# Patient Record
Sex: FEMALE | Race: OTHER | HISPANIC OR LATINO | URBAN - METROPOLITAN AREA
[De-identification: names, ages, dates, MRNs, and addresses within clinical notes are randomized per-mention and may not be internally consistent; named-entity substitution may affect disease eponyms.]

---

## 2021-01-01 ENCOUNTER — INPATIENT (INPATIENT)
Facility: HOSPITAL | Age: 86
LOS: 3 days | Discharge: HOME | DRG: 94 | End: 2021-02-26
Attending: INTERNAL MEDICINE | Admitting: HOSPITALIST
Payer: MEDICARE

## 2021-01-01 ENCOUNTER — INPATIENT (INPATIENT)
Facility: HOSPITAL | Age: 86
LOS: 11 days | DRG: 640 | End: 2021-05-23
Attending: FAMILY MEDICINE | Admitting: INTERNAL MEDICINE
Payer: MEDICARE

## 2021-01-01 VITALS
WEIGHT: 104.06 LBS | HEIGHT: 63 IN | OXYGEN SATURATION: 95 % | DIASTOLIC BLOOD PRESSURE: 87 MMHG | HEART RATE: 87 BPM | RESPIRATION RATE: 17 BRPM | TEMPERATURE: 97 F | SYSTOLIC BLOOD PRESSURE: 138 MMHG

## 2021-01-01 VITALS
DIASTOLIC BLOOD PRESSURE: 76 MMHG | OXYGEN SATURATION: 97 % | TEMPERATURE: 98 F | RESPIRATION RATE: 22 BRPM | SYSTOLIC BLOOD PRESSURE: 108 MMHG | HEART RATE: 120 BPM | HEIGHT: 63 IN

## 2021-01-01 VITALS
SYSTOLIC BLOOD PRESSURE: 132 MMHG | TEMPERATURE: 98 F | RESPIRATION RATE: 18 BRPM | HEART RATE: 77 BPM | DIASTOLIC BLOOD PRESSURE: 68 MMHG | OXYGEN SATURATION: 96 %

## 2021-01-01 VITALS
SYSTOLIC BLOOD PRESSURE: 89 MMHG | RESPIRATION RATE: 24 BRPM | OXYGEN SATURATION: 85 % | TEMPERATURE: 99 F | HEART RATE: 120 BPM | DIASTOLIC BLOOD PRESSURE: 58 MMHG

## 2021-01-01 DIAGNOSIS — J98.09 OTHER DISEASES OF BRONCHUS, NOT ELSEWHERE CLASSIFIED: ICD-10-CM

## 2021-01-01 DIAGNOSIS — Z29.9 ENCOUNTER FOR PROPHYLACTIC MEASURES, UNSPECIFIED: ICD-10-CM

## 2021-01-01 DIAGNOSIS — R52 PAIN, UNSPECIFIED: ICD-10-CM

## 2021-01-01 DIAGNOSIS — L89.90 PRESSURE ULCER OF UNSPECIFIED SITE, UNSPECIFIED STAGE: ICD-10-CM

## 2021-01-01 DIAGNOSIS — I48.20 CHRONIC ATRIAL FIBRILLATION, UNSPECIFIED: ICD-10-CM

## 2021-01-01 DIAGNOSIS — R06.00 DYSPNEA, UNSPECIFIED: ICD-10-CM

## 2021-01-01 DIAGNOSIS — R53.2 FUNCTIONAL QUADRIPLEGIA: ICD-10-CM

## 2021-01-01 DIAGNOSIS — G93.40 ENCEPHALOPATHY, UNSPECIFIED: ICD-10-CM

## 2021-01-01 DIAGNOSIS — K59.00 CONSTIPATION, UNSPECIFIED: ICD-10-CM

## 2021-01-01 DIAGNOSIS — R56.9 UNSPECIFIED CONVULSIONS: ICD-10-CM

## 2021-01-01 DIAGNOSIS — Z51.5 ENCOUNTER FOR PALLIATIVE CARE: ICD-10-CM

## 2021-01-01 DIAGNOSIS — R45.1 RESTLESSNESS AND AGITATION: ICD-10-CM

## 2021-01-01 DIAGNOSIS — F03.90 UNSPECIFIED DEMENTIA, UNSPECIFIED SEVERITY, WITHOUT BEHAVIORAL DISTURBANCE, PSYCHOTIC DISTURBANCE, MOOD DISTURBANCE, AND ANXIETY: ICD-10-CM

## 2021-01-01 DIAGNOSIS — G40.909 EPILEPSY, UNSPECIFIED, NOT INTRACTABLE, WITHOUT STATUS EPILEPTICUS: ICD-10-CM

## 2021-01-01 DIAGNOSIS — Z02.9 ENCOUNTER FOR ADMINISTRATIVE EXAMINATIONS, UNSPECIFIED: ICD-10-CM

## 2021-01-01 DIAGNOSIS — J98.11 ATELECTASIS: ICD-10-CM

## 2021-01-01 DIAGNOSIS — I48.91 UNSPECIFIED ATRIAL FIBRILLATION: ICD-10-CM

## 2021-01-01 DIAGNOSIS — R41.0 DISORIENTATION, UNSPECIFIED: ICD-10-CM

## 2021-01-01 DIAGNOSIS — S06.5X9A TRAUMATIC SUBDURAL HEMORRHAGE WITH LOSS OF CONSCIOUSNESS OF UNSPECIFIED DURATION, INITIAL ENCOUNTER: ICD-10-CM

## 2021-01-01 DIAGNOSIS — E11.65 TYPE 2 DIABETES MELLITUS WITH HYPERGLYCEMIA: ICD-10-CM

## 2021-01-01 DIAGNOSIS — E87.0 HYPEROSMOLALITY AND HYPERNATREMIA: ICD-10-CM

## 2021-01-01 DIAGNOSIS — Z71.89 OTHER SPECIFIED COUNSELING: ICD-10-CM

## 2021-01-01 DIAGNOSIS — R62.51 FAILURE TO THRIVE (CHILD): ICD-10-CM

## 2021-01-01 DIAGNOSIS — L89.159 PRESSURE ULCER OF SACRAL REGION, UNSPECIFIED STAGE: ICD-10-CM

## 2021-01-01 LAB
A1C WITH ESTIMATED AVERAGE GLUCOSE RESULT: 7 % — HIGH (ref 4–5.6)
ALBUMIN SERPL ELPH-MCNC: 2 G/DL — LOW (ref 3.3–5)
ALBUMIN SERPL ELPH-MCNC: 2.8 G/DL — LOW (ref 3.3–5)
ALBUMIN SERPL ELPH-MCNC: 2.9 G/DL — LOW (ref 3.3–5)
ALBUMIN SERPL ELPH-MCNC: 3 G/DL — LOW (ref 3.3–5)
ALBUMIN SERPL ELPH-MCNC: 3.5 G/DL — SIGNIFICANT CHANGE UP (ref 3.3–5)
ALP SERPL-CCNC: 113 U/L — SIGNIFICANT CHANGE UP (ref 40–120)
ALP SERPL-CCNC: 171 U/L — HIGH (ref 40–120)
ALP SERPL-CCNC: 176 U/L — HIGH (ref 40–120)
ALP SERPL-CCNC: 184 U/L — HIGH (ref 40–120)
ALP SERPL-CCNC: 189 U/L — HIGH (ref 40–120)
ALT FLD-CCNC: 10 U/L — SIGNIFICANT CHANGE UP (ref 10–45)
ALT FLD-CCNC: 18 U/L — SIGNIFICANT CHANGE UP (ref 10–45)
ALT FLD-CCNC: 21 U/L — SIGNIFICANT CHANGE UP (ref 10–45)
ALT FLD-CCNC: 21 U/L — SIGNIFICANT CHANGE UP (ref 10–45)
ALT FLD-CCNC: 24 U/L — SIGNIFICANT CHANGE UP (ref 10–45)
ANION GAP SERPL CALC-SCNC: 10 MMOL/L — SIGNIFICANT CHANGE UP (ref 5–17)
ANION GAP SERPL CALC-SCNC: 12 MMOL/L — SIGNIFICANT CHANGE UP (ref 5–17)
ANION GAP SERPL CALC-SCNC: 13 MMOL/L — SIGNIFICANT CHANGE UP (ref 5–17)
ANION GAP SERPL CALC-SCNC: 13 MMOL/L — SIGNIFICANT CHANGE UP (ref 5–17)
ANION GAP SERPL CALC-SCNC: 7 MMOL/L — SIGNIFICANT CHANGE UP (ref 5–17)
ANION GAP SERPL CALC-SCNC: 7 MMOL/L — SIGNIFICANT CHANGE UP (ref 5–17)
ANION GAP SERPL CALC-SCNC: 8 MMOL/L — SIGNIFICANT CHANGE UP (ref 5–17)
ANION GAP SERPL CALC-SCNC: 9 MMOL/L — SIGNIFICANT CHANGE UP (ref 5–17)
ANISOCYTOSIS BLD QL: SLIGHT — SIGNIFICANT CHANGE UP
APPEARANCE UR: ABNORMAL
APPEARANCE UR: ABNORMAL
APTT BLD: 27.4 SEC — LOW (ref 27.5–35.5)
APTT BLD: 31.3 SEC — SIGNIFICANT CHANGE UP (ref 27.5–35.5)
AST SERPL-CCNC: 17 U/L — SIGNIFICANT CHANGE UP (ref 10–40)
AST SERPL-CCNC: 18 U/L — SIGNIFICANT CHANGE UP (ref 10–40)
AST SERPL-CCNC: 23 U/L — SIGNIFICANT CHANGE UP (ref 10–40)
AST SERPL-CCNC: 24 U/L — SIGNIFICANT CHANGE UP (ref 10–40)
AST SERPL-CCNC: 25 U/L — SIGNIFICANT CHANGE UP (ref 10–40)
BACTERIA # UR AUTO: NEGATIVE — SIGNIFICANT CHANGE UP
BASE EXCESS BLDV CALC-SCNC: 3.9 MMOL/L — HIGH (ref -2–2)
BASOPHILS # BLD AUTO: 0.03 K/UL — SIGNIFICANT CHANGE UP (ref 0–0.2)
BASOPHILS # BLD AUTO: 0.05 K/UL — SIGNIFICANT CHANGE UP (ref 0–0.2)
BASOPHILS # BLD AUTO: 0.06 K/UL — SIGNIFICANT CHANGE UP (ref 0–0.2)
BASOPHILS # BLD AUTO: 0.06 K/UL — SIGNIFICANT CHANGE UP (ref 0–0.2)
BASOPHILS # BLD AUTO: 0.09 K/UL — SIGNIFICANT CHANGE UP (ref 0–0.2)
BASOPHILS NFR BLD AUTO: 0.6 % — SIGNIFICANT CHANGE UP (ref 0–2)
BASOPHILS NFR BLD AUTO: 0.9 % — SIGNIFICANT CHANGE UP (ref 0–2)
BASOPHILS NFR BLD AUTO: 0.9 % — SIGNIFICANT CHANGE UP (ref 0–2)
BASOPHILS NFR BLD AUTO: 1.1 % — SIGNIFICANT CHANGE UP (ref 0–2)
BASOPHILS NFR BLD AUTO: 1.3 % — SIGNIFICANT CHANGE UP (ref 0–2)
BILIRUB DIRECT SERPL-MCNC: <0.1 MG/DL — SIGNIFICANT CHANGE UP (ref 0–0.2)
BILIRUB INDIRECT FLD-MCNC: >0.1 MG/DL — LOW (ref 0.2–1)
BILIRUB SERPL-MCNC: 0.2 MG/DL — SIGNIFICANT CHANGE UP (ref 0.2–1.2)
BILIRUB SERPL-MCNC: 0.3 MG/DL — SIGNIFICANT CHANGE UP (ref 0.2–1.2)
BILIRUB UR-MCNC: NEGATIVE — SIGNIFICANT CHANGE UP
BILIRUB UR-MCNC: NEGATIVE — SIGNIFICANT CHANGE UP
BUN SERPL-MCNC: 10 MG/DL — SIGNIFICANT CHANGE UP (ref 7–23)
BUN SERPL-MCNC: 10 MG/DL — SIGNIFICANT CHANGE UP (ref 7–23)
BUN SERPL-MCNC: 12 MG/DL — SIGNIFICANT CHANGE UP (ref 7–23)
BUN SERPL-MCNC: 12 MG/DL — SIGNIFICANT CHANGE UP (ref 7–23)
BUN SERPL-MCNC: 13 MG/DL — SIGNIFICANT CHANGE UP (ref 7–23)
BUN SERPL-MCNC: 16 MG/DL — SIGNIFICANT CHANGE UP (ref 7–23)
BUN SERPL-MCNC: 17 MG/DL — SIGNIFICANT CHANGE UP (ref 7–23)
BUN SERPL-MCNC: 20 MG/DL — SIGNIFICANT CHANGE UP (ref 7–23)
BUN SERPL-MCNC: 22 MG/DL — SIGNIFICANT CHANGE UP (ref 7–23)
BUN SERPL-MCNC: 25 MG/DL — HIGH (ref 7–23)
BUN SERPL-MCNC: 29 MG/DL — HIGH (ref 7–23)
BUN SERPL-MCNC: 32 MG/DL — HIGH (ref 7–23)
BUN SERPL-MCNC: 36 MG/DL — HIGH (ref 7–23)
BUN SERPL-MCNC: 39 MG/DL — HIGH (ref 7–23)
BUN SERPL-MCNC: 39 MG/DL — HIGH (ref 7–23)
BUN SERPL-MCNC: 7 MG/DL — SIGNIFICANT CHANGE UP (ref 7–23)
BUN SERPL-MCNC: 8 MG/DL — SIGNIFICANT CHANGE UP (ref 7–23)
CA-I SERPL-SCNC: 1.22 MMOL/L — SIGNIFICANT CHANGE UP (ref 1.12–1.3)
CALCIUM SERPL-MCNC: 6.3 MG/DL — CRITICAL LOW (ref 8.4–10.5)
CALCIUM SERPL-MCNC: 7.7 MG/DL — LOW (ref 8.4–10.5)
CALCIUM SERPL-MCNC: 7.8 MG/DL — LOW (ref 8.4–10.5)
CALCIUM SERPL-MCNC: 7.9 MG/DL — LOW (ref 8.4–10.5)
CALCIUM SERPL-MCNC: 7.9 MG/DL — LOW (ref 8.4–10.5)
CALCIUM SERPL-MCNC: 8.3 MG/DL — LOW (ref 8.4–10.5)
CALCIUM SERPL-MCNC: 8.4 MG/DL — SIGNIFICANT CHANGE UP (ref 8.4–10.5)
CALCIUM SERPL-MCNC: 8.5 MG/DL — SIGNIFICANT CHANGE UP (ref 8.4–10.5)
CALCIUM SERPL-MCNC: 8.7 MG/DL — SIGNIFICANT CHANGE UP (ref 8.4–10.5)
CALCIUM SERPL-MCNC: 8.8 MG/DL — SIGNIFICANT CHANGE UP (ref 8.4–10.5)
CALCIUM SERPL-MCNC: 8.9 MG/DL — SIGNIFICANT CHANGE UP (ref 8.4–10.5)
CALCIUM SERPL-MCNC: 9 MG/DL — SIGNIFICANT CHANGE UP (ref 8.4–10.5)
CALCIUM SERPL-MCNC: 9.4 MG/DL — SIGNIFICANT CHANGE UP (ref 8.4–10.5)
CHLORIDE BLDV-SCNC: >120 MMOL/L — HIGH (ref 96–108)
CHLORIDE SERPL-SCNC: 106 MMOL/L — SIGNIFICANT CHANGE UP (ref 96–108)
CHLORIDE SERPL-SCNC: 106 MMOL/L — SIGNIFICANT CHANGE UP (ref 96–108)
CHLORIDE SERPL-SCNC: 108 MMOL/L — SIGNIFICANT CHANGE UP (ref 96–108)
CHLORIDE SERPL-SCNC: 114 MMOL/L — HIGH (ref 96–108)
CHLORIDE SERPL-SCNC: 116 MMOL/L — HIGH (ref 96–108)
CHLORIDE SERPL-SCNC: 116 MMOL/L — HIGH (ref 96–108)
CHLORIDE SERPL-SCNC: 117 MMOL/L — HIGH (ref 96–108)
CHLORIDE SERPL-SCNC: 120 MMOL/L — HIGH (ref 96–108)
CHLORIDE SERPL-SCNC: 121 MMOL/L — HIGH (ref 96–108)
CHLORIDE SERPL-SCNC: 122 MMOL/L — HIGH (ref 96–108)
CHLORIDE SERPL-SCNC: 126 MMOL/L — HIGH (ref 96–108)
CHLORIDE SERPL-SCNC: 128 MMOL/L — HIGH (ref 96–108)
CHLORIDE SERPL-SCNC: 131 MMOL/L — HIGH (ref 96–108)
CHLORIDE SERPL-SCNC: 131 MMOL/L — HIGH (ref 96–108)
CHLORIDE SERPL-SCNC: 132 MMOL/L — HIGH (ref 96–108)
CHLORIDE SERPL-SCNC: 133 MMOL/L — HIGH (ref 96–108)
CHLORIDE SERPL-SCNC: >135 MMOL/L — HIGH (ref 96–108)
CO2 BLDV-SCNC: 33 MMOL/L — HIGH (ref 22–30)
CO2 SERPL-SCNC: 14 MMOL/L — LOW (ref 22–31)
CO2 SERPL-SCNC: 17 MMOL/L — LOW (ref 22–31)
CO2 SERPL-SCNC: 17 MMOL/L — LOW (ref 22–31)
CO2 SERPL-SCNC: 19 MMOL/L — LOW (ref 22–31)
CO2 SERPL-SCNC: 19 MMOL/L — LOW (ref 22–31)
CO2 SERPL-SCNC: 20 MMOL/L — LOW (ref 22–31)
CO2 SERPL-SCNC: 21 MMOL/L — LOW (ref 22–31)
CO2 SERPL-SCNC: 22 MMOL/L — SIGNIFICANT CHANGE UP (ref 22–31)
CO2 SERPL-SCNC: 22 MMOL/L — SIGNIFICANT CHANGE UP (ref 22–31)
CO2 SERPL-SCNC: 23 MMOL/L — SIGNIFICANT CHANGE UP (ref 22–31)
CO2 SERPL-SCNC: 23 MMOL/L — SIGNIFICANT CHANGE UP (ref 22–31)
CO2 SERPL-SCNC: 24 MMOL/L — SIGNIFICANT CHANGE UP (ref 22–31)
CO2 SERPL-SCNC: 27 MMOL/L — SIGNIFICANT CHANGE UP (ref 22–31)
CO2 SERPL-SCNC: 30 MMOL/L — SIGNIFICANT CHANGE UP (ref 22–31)
COLOR SPEC: SIGNIFICANT CHANGE UP
COLOR SPEC: YELLOW — SIGNIFICANT CHANGE UP
COVID-19 SPIKE DOMAIN AB INTERP: POSITIVE
COVID-19 SPIKE DOMAIN ANTIBODY RESULT: 182 U/ML — HIGH
CREAT SERPL-MCNC: 0.42 MG/DL — LOW (ref 0.5–1.3)
CREAT SERPL-MCNC: 0.44 MG/DL — LOW (ref 0.5–1.3)
CREAT SERPL-MCNC: 0.44 MG/DL — LOW (ref 0.5–1.3)
CREAT SERPL-MCNC: 0.45 MG/DL — LOW (ref 0.5–1.3)
CREAT SERPL-MCNC: 0.46 MG/DL — LOW (ref 0.5–1.3)
CREAT SERPL-MCNC: 0.46 MG/DL — LOW (ref 0.5–1.3)
CREAT SERPL-MCNC: 0.48 MG/DL — LOW (ref 0.5–1.3)
CREAT SERPL-MCNC: 0.48 MG/DL — LOW (ref 0.5–1.3)
CREAT SERPL-MCNC: 0.49 MG/DL — LOW (ref 0.5–1.3)
CREAT SERPL-MCNC: 0.59 MG/DL — SIGNIFICANT CHANGE UP (ref 0.5–1.3)
CREAT SERPL-MCNC: 0.59 MG/DL — SIGNIFICANT CHANGE UP (ref 0.5–1.3)
CREAT SERPL-MCNC: 0.61 MG/DL — SIGNIFICANT CHANGE UP (ref 0.5–1.3)
CREAT SERPL-MCNC: 0.63 MG/DL — SIGNIFICANT CHANGE UP (ref 0.5–1.3)
CREAT SERPL-MCNC: 0.63 MG/DL — SIGNIFICANT CHANGE UP (ref 0.5–1.3)
CREAT SERPL-MCNC: 0.64 MG/DL — SIGNIFICANT CHANGE UP (ref 0.5–1.3)
CREAT SERPL-MCNC: 0.65 MG/DL — SIGNIFICANT CHANGE UP (ref 0.5–1.3)
CREAT SERPL-MCNC: 0.67 MG/DL — SIGNIFICANT CHANGE UP (ref 0.5–1.3)
CRP SERPL-MCNC: 1.65 MG/DL — HIGH (ref 0–0.4)
CRP SERPL-MCNC: 2.25 MG/DL — HIGH (ref 0–0.4)
CULTURE RESULTS: SIGNIFICANT CHANGE UP
DACRYOCYTES BLD QL SMEAR: SLIGHT — SIGNIFICANT CHANGE UP
DIFF PNL FLD: NEGATIVE — SIGNIFICANT CHANGE UP
DIFF PNL FLD: NEGATIVE — SIGNIFICANT CHANGE UP
ELLIPTOCYTES BLD QL SMEAR: SLIGHT — SIGNIFICANT CHANGE UP
EOSINOPHIL # BLD AUTO: 0 K/UL — SIGNIFICANT CHANGE UP (ref 0–0.5)
EOSINOPHIL # BLD AUTO: 0.16 K/UL — SIGNIFICANT CHANGE UP (ref 0–0.5)
EOSINOPHIL # BLD AUTO: 0.18 K/UL — SIGNIFICANT CHANGE UP (ref 0–0.5)
EOSINOPHIL # BLD AUTO: 0.29 K/UL — SIGNIFICANT CHANGE UP (ref 0–0.5)
EOSINOPHIL # BLD AUTO: 0.36 K/UL — SIGNIFICANT CHANGE UP (ref 0–0.5)
EOSINOPHIL NFR BLD AUTO: 0 % — SIGNIFICANT CHANGE UP (ref 0–6)
EOSINOPHIL NFR BLD AUTO: 3.2 % — SIGNIFICANT CHANGE UP (ref 0–6)
EOSINOPHIL NFR BLD AUTO: 3.4 % — SIGNIFICANT CHANGE UP (ref 0–6)
EOSINOPHIL NFR BLD AUTO: 5.2 % — SIGNIFICANT CHANGE UP (ref 0–6)
EOSINOPHIL NFR BLD AUTO: 7.6 % — HIGH (ref 0–6)
EPI CELLS # UR: 2 /HPF — SIGNIFICANT CHANGE UP
ERYTHROCYTE [SEDIMENTATION RATE] IN BLOOD: 30 MM/HR — HIGH (ref 0–20)
ERYTHROCYTE [SEDIMENTATION RATE] IN BLOOD: 55 MM/HR — HIGH (ref 0–20)
ESTIMATED AVERAGE GLUCOSE: 154 MG/DL — HIGH (ref 68–114)
FOLATE SERPL-MCNC: 6.4 NG/ML — SIGNIFICANT CHANGE UP
GAS PNL BLDV: 160 MMOL/L — CRITICAL HIGH (ref 135–145)
GAS PNL BLDV: SIGNIFICANT CHANGE UP
GLUCOSE BLDC GLUCOMTR-MCNC: 101 MG/DL — HIGH (ref 70–99)
GLUCOSE BLDC GLUCOMTR-MCNC: 122 MG/DL — HIGH (ref 70–99)
GLUCOSE BLDC GLUCOMTR-MCNC: 124 MG/DL — HIGH (ref 70–99)
GLUCOSE BLDC GLUCOMTR-MCNC: 131 MG/DL — HIGH (ref 70–99)
GLUCOSE BLDC GLUCOMTR-MCNC: 137 MG/DL — HIGH (ref 70–99)
GLUCOSE BLDC GLUCOMTR-MCNC: 138 MG/DL — HIGH (ref 70–99)
GLUCOSE BLDC GLUCOMTR-MCNC: 141 MG/DL — HIGH (ref 70–99)
GLUCOSE BLDC GLUCOMTR-MCNC: 143 MG/DL — HIGH (ref 70–99)
GLUCOSE BLDC GLUCOMTR-MCNC: 148 MG/DL — HIGH (ref 70–99)
GLUCOSE BLDC GLUCOMTR-MCNC: 166 MG/DL — HIGH (ref 70–99)
GLUCOSE BLDC GLUCOMTR-MCNC: 169 MG/DL — HIGH (ref 70–99)
GLUCOSE BLDC GLUCOMTR-MCNC: 172 MG/DL — HIGH (ref 70–99)
GLUCOSE BLDC GLUCOMTR-MCNC: 182 MG/DL — HIGH (ref 70–99)
GLUCOSE BLDC GLUCOMTR-MCNC: 183 MG/DL — HIGH (ref 70–99)
GLUCOSE BLDC GLUCOMTR-MCNC: 186 MG/DL — HIGH (ref 70–99)
GLUCOSE BLDC GLUCOMTR-MCNC: 187 MG/DL — HIGH (ref 70–99)
GLUCOSE BLDC GLUCOMTR-MCNC: 191 MG/DL — HIGH (ref 70–99)
GLUCOSE BLDC GLUCOMTR-MCNC: 192 MG/DL — HIGH (ref 70–99)
GLUCOSE BLDC GLUCOMTR-MCNC: 194 MG/DL — HIGH (ref 70–99)
GLUCOSE BLDC GLUCOMTR-MCNC: 206 MG/DL — HIGH (ref 70–99)
GLUCOSE BLDC GLUCOMTR-MCNC: 213 MG/DL — HIGH (ref 70–99)
GLUCOSE BLDC GLUCOMTR-MCNC: 214 MG/DL — HIGH (ref 70–99)
GLUCOSE BLDC GLUCOMTR-MCNC: 223 MG/DL — HIGH (ref 70–99)
GLUCOSE BLDC GLUCOMTR-MCNC: 228 MG/DL — HIGH (ref 70–99)
GLUCOSE BLDC GLUCOMTR-MCNC: 246 MG/DL — HIGH (ref 70–99)
GLUCOSE BLDC GLUCOMTR-MCNC: 287 MG/DL — HIGH (ref 70–99)
GLUCOSE BLDC GLUCOMTR-MCNC: 294 MG/DL — HIGH (ref 70–99)
GLUCOSE BLDC GLUCOMTR-MCNC: 403 MG/DL — HIGH (ref 70–99)
GLUCOSE BLDC GLUCOMTR-MCNC: 46 MG/DL — CRITICAL LOW (ref 70–99)
GLUCOSE BLDC GLUCOMTR-MCNC: 54 MG/DL — CRITICAL LOW (ref 70–99)
GLUCOSE BLDC GLUCOMTR-MCNC: 90 MG/DL — SIGNIFICANT CHANGE UP (ref 70–99)
GLUCOSE BLDV-MCNC: 274 MG/DL — HIGH (ref 70–99)
GLUCOSE SERPL-MCNC: 113 MG/DL — HIGH (ref 70–99)
GLUCOSE SERPL-MCNC: 119 MG/DL — HIGH (ref 70–99)
GLUCOSE SERPL-MCNC: 122 MG/DL — HIGH (ref 70–99)
GLUCOSE SERPL-MCNC: 136 MG/DL — HIGH (ref 70–99)
GLUCOSE SERPL-MCNC: 155 MG/DL — HIGH (ref 70–99)
GLUCOSE SERPL-MCNC: 156 MG/DL — HIGH (ref 70–99)
GLUCOSE SERPL-MCNC: 179 MG/DL — HIGH (ref 70–99)
GLUCOSE SERPL-MCNC: 189 MG/DL — HIGH (ref 70–99)
GLUCOSE SERPL-MCNC: 193 MG/DL — HIGH (ref 70–99)
GLUCOSE SERPL-MCNC: 193 MG/DL — HIGH (ref 70–99)
GLUCOSE SERPL-MCNC: 202 MG/DL — HIGH (ref 70–99)
GLUCOSE SERPL-MCNC: 203 MG/DL — HIGH (ref 70–99)
GLUCOSE SERPL-MCNC: 221 MG/DL — HIGH (ref 70–99)
GLUCOSE SERPL-MCNC: 222 MG/DL — HIGH (ref 70–99)
GLUCOSE SERPL-MCNC: 234 MG/DL — HIGH (ref 70–99)
GLUCOSE SERPL-MCNC: 255 MG/DL — HIGH (ref 70–99)
GLUCOSE SERPL-MCNC: 267 MG/DL — HIGH (ref 70–99)
GLUCOSE UR QL: ABNORMAL
GLUCOSE UR QL: NEGATIVE — SIGNIFICANT CHANGE UP
HCO3 BLDV-SCNC: 31 MMOL/L — HIGH (ref 21–29)
HCT VFR BLD CALC: 39.4 % — SIGNIFICANT CHANGE UP (ref 34.5–45)
HCT VFR BLD CALC: 40.8 % — SIGNIFICANT CHANGE UP (ref 34.5–45)
HCT VFR BLD CALC: 41.6 % — SIGNIFICANT CHANGE UP (ref 34.5–45)
HCT VFR BLD CALC: 44.2 % — SIGNIFICANT CHANGE UP (ref 34.5–45)
HCT VFR BLD CALC: 44.3 % — SIGNIFICANT CHANGE UP (ref 34.5–45)
HCT VFR BLD CALC: 48.7 % — HIGH (ref 34.5–45)
HCT VFR BLDA CALC: 45 % — SIGNIFICANT CHANGE UP (ref 39–50)
HGB BLD CALC-MCNC: 14.7 G/DL — SIGNIFICANT CHANGE UP (ref 11.5–15.5)
HGB BLD-MCNC: 12.1 G/DL — SIGNIFICANT CHANGE UP (ref 11.5–15.5)
HGB BLD-MCNC: 12.3 G/DL — SIGNIFICANT CHANGE UP (ref 11.5–15.5)
HGB BLD-MCNC: 12.6 G/DL — SIGNIFICANT CHANGE UP (ref 11.5–15.5)
HGB BLD-MCNC: 13 G/DL — SIGNIFICANT CHANGE UP (ref 11.5–15.5)
HGB BLD-MCNC: 13.3 G/DL — SIGNIFICANT CHANGE UP (ref 11.5–15.5)
HGB BLD-MCNC: 14.2 G/DL — SIGNIFICANT CHANGE UP (ref 11.5–15.5)
HOMOCYSTEINE LEVEL: 8.9 UMOL/L — SIGNIFICANT CHANGE UP
HYALINE CASTS # UR AUTO: 9 /LPF — HIGH (ref 0–2)
IMM GRANULOCYTES NFR BLD AUTO: 0.2 % — SIGNIFICANT CHANGE UP (ref 0–1.5)
IMM GRANULOCYTES NFR BLD AUTO: 0.2 % — SIGNIFICANT CHANGE UP (ref 0–1.5)
IMM GRANULOCYTES NFR BLD AUTO: 0.4 % — SIGNIFICANT CHANGE UP (ref 0–1.5)
IMM GRANULOCYTES NFR BLD AUTO: 0.9 % — SIGNIFICANT CHANGE UP (ref 0–1.5)
INR BLD: 1.18 RATIO — HIGH (ref 0.88–1.16)
INR BLD: 1.29 RATIO — HIGH (ref 0.88–1.16)
KETONES UR-MCNC: NEGATIVE — SIGNIFICANT CHANGE UP
KETONES UR-MCNC: SIGNIFICANT CHANGE UP
LACTATE BLDV-MCNC: 2 MMOL/L — SIGNIFICANT CHANGE UP (ref 0.7–2)
LEUKOCYTE ESTERASE UR-ACNC: ABNORMAL
LEUKOCYTE ESTERASE UR-ACNC: NEGATIVE — SIGNIFICANT CHANGE UP
LEVETIRACETAM SERPL-MCNC: 12.6 UG/ML — SIGNIFICANT CHANGE UP (ref 10–40)
LEVETIRACETAM SERPL-MCNC: 29.2 UG/ML — SIGNIFICANT CHANGE UP (ref 10–40)
LYMPHOCYTES # BLD AUTO: 1.03 K/UL — SIGNIFICANT CHANGE UP (ref 1–3.3)
LYMPHOCYTES # BLD AUTO: 1.29 K/UL — SIGNIFICANT CHANGE UP (ref 1–3.3)
LYMPHOCYTES # BLD AUTO: 1.46 K/UL — SIGNIFICANT CHANGE UP (ref 1–3.3)
LYMPHOCYTES # BLD AUTO: 1.83 K/UL — SIGNIFICANT CHANGE UP (ref 1–3.3)
LYMPHOCYTES # BLD AUTO: 10.5 % — LOW (ref 13–44)
LYMPHOCYTES # BLD AUTO: 2.06 K/UL — SIGNIFICANT CHANGE UP (ref 1–3.3)
LYMPHOCYTES # BLD AUTO: 27.7 % — SIGNIFICANT CHANGE UP (ref 13–44)
LYMPHOCYTES # BLD AUTO: 30.8 % — SIGNIFICANT CHANGE UP (ref 13–44)
LYMPHOCYTES # BLD AUTO: 32.9 % — SIGNIFICANT CHANGE UP (ref 13–44)
LYMPHOCYTES # BLD AUTO: 36.7 % — SIGNIFICANT CHANGE UP (ref 13–44)
MACROCYTES BLD QL: SLIGHT — SIGNIFICANT CHANGE UP
MAGNESIUM SERPL-MCNC: 2.2 MG/DL — SIGNIFICANT CHANGE UP (ref 1.6–2.6)
MAGNESIUM SERPL-MCNC: 2.3 MG/DL — SIGNIFICANT CHANGE UP (ref 1.6–2.6)
MAGNESIUM SERPL-MCNC: 2.3 MG/DL — SIGNIFICANT CHANGE UP (ref 1.6–2.6)
MAGNESIUM SERPL-MCNC: 2.6 MG/DL — SIGNIFICANT CHANGE UP (ref 1.6–2.6)
MAGNESIUM SERPL-MCNC: 2.7 MG/DL — HIGH (ref 1.6–2.6)
MAGNESIUM SERPL-MCNC: 2.9 MG/DL — HIGH (ref 1.6–2.6)
MAGNESIUM SERPL-MCNC: 3.2 MG/DL — HIGH (ref 1.6–2.6)
MANUAL SMEAR VERIFICATION: SIGNIFICANT CHANGE UP
MCHC RBC-ENTMCNC: 28.5 GM/DL — LOW (ref 32–36)
MCHC RBC-ENTMCNC: 28.7 PG — SIGNIFICANT CHANGE UP (ref 27–34)
MCHC RBC-ENTMCNC: 28.9 PG — SIGNIFICANT CHANGE UP (ref 27–34)
MCHC RBC-ENTMCNC: 29.1 PG — SIGNIFICANT CHANGE UP (ref 27–34)
MCHC RBC-ENTMCNC: 29.1 PG — SIGNIFICANT CHANGE UP (ref 27–34)
MCHC RBC-ENTMCNC: 29.2 GM/DL — LOW (ref 32–36)
MCHC RBC-ENTMCNC: 29.2 PG — SIGNIFICANT CHANGE UP (ref 27–34)
MCHC RBC-ENTMCNC: 29.3 GM/DL — LOW (ref 32–36)
MCHC RBC-ENTMCNC: 29.6 PG — SIGNIFICANT CHANGE UP (ref 27–34)
MCHC RBC-ENTMCNC: 30.1 GM/DL — LOW (ref 32–36)
MCHC RBC-ENTMCNC: 30.7 GM/DL — LOW (ref 32–36)
MCHC RBC-ENTMCNC: 32 GM/DL — SIGNIFICANT CHANGE UP (ref 32–36)
MCV RBC AUTO: 100.2 FL — HIGH (ref 80–100)
MCV RBC AUTO: 102.1 FL — HIGH (ref 80–100)
MCV RBC AUTO: 92.4 FL — SIGNIFICANT CHANGE UP (ref 80–100)
MCV RBC AUTO: 93.6 FL — SIGNIFICANT CHANGE UP (ref 80–100)
MCV RBC AUTO: 96.7 FL — SIGNIFICANT CHANGE UP (ref 80–100)
MCV RBC AUTO: 98.4 FL — SIGNIFICANT CHANGE UP (ref 80–100)
METHYLMALONIC ACID LEVEL: 166 NMOL/L — SIGNIFICANT CHANGE UP (ref 87–318)
MONOCYTES # BLD AUTO: 0.09 K/UL — SIGNIFICANT CHANGE UP (ref 0–0.9)
MONOCYTES # BLD AUTO: 0.34 K/UL — SIGNIFICANT CHANGE UP (ref 0–0.9)
MONOCYTES # BLD AUTO: 0.41 K/UL — SIGNIFICANT CHANGE UP (ref 0–0.9)
MONOCYTES # BLD AUTO: 0.44 K/UL — SIGNIFICANT CHANGE UP (ref 0–0.9)
MONOCYTES # BLD AUTO: 0.44 K/UL — SIGNIFICANT CHANGE UP (ref 0–0.9)
MONOCYTES NFR BLD AUTO: 0.9 % — LOW (ref 2–14)
MONOCYTES NFR BLD AUTO: 7.3 % — SIGNIFICANT CHANGE UP (ref 2–14)
MONOCYTES NFR BLD AUTO: 7.4 % — SIGNIFICANT CHANGE UP (ref 2–14)
MONOCYTES NFR BLD AUTO: 7.8 % — SIGNIFICANT CHANGE UP (ref 2–14)
MONOCYTES NFR BLD AUTO: 9.3 % — SIGNIFICANT CHANGE UP (ref 2–14)
NEUTROPHILS # BLD AUTO: 2.4 K/UL — SIGNIFICANT CHANGE UP (ref 1.8–7.4)
NEUTROPHILS # BLD AUTO: 2.75 K/UL — SIGNIFICANT CHANGE UP (ref 1.8–7.4)
NEUTROPHILS # BLD AUTO: 2.79 K/UL — SIGNIFICANT CHANGE UP (ref 1.8–7.4)
NEUTROPHILS # BLD AUTO: 3.08 K/UL — SIGNIFICANT CHANGE UP (ref 1.8–7.4)
NEUTROPHILS # BLD AUTO: 8.49 K/UL — HIGH (ref 1.8–7.4)
NEUTROPHILS NFR BLD AUTO: 49 % — SIGNIFICANT CHANGE UP (ref 43–77)
NEUTROPHILS NFR BLD AUTO: 50.6 % — SIGNIFICANT CHANGE UP (ref 43–77)
NEUTROPHILS NFR BLD AUTO: 55.4 % — SIGNIFICANT CHANGE UP (ref 43–77)
NEUTROPHILS NFR BLD AUTO: 60.1 % — SIGNIFICANT CHANGE UP (ref 43–77)
NEUTROPHILS NFR BLD AUTO: 86.8 % — HIGH (ref 43–77)
NITRITE UR-MCNC: NEGATIVE — SIGNIFICANT CHANGE UP
NITRITE UR-MCNC: NEGATIVE — SIGNIFICANT CHANGE UP
NRBC # BLD: 0 /100 WBCS — SIGNIFICANT CHANGE UP (ref 0–0)
OVALOCYTES BLD QL SMEAR: SLIGHT — SIGNIFICANT CHANGE UP
PCO2 BLDV: 59 MMHG — HIGH (ref 35–50)
PH BLDV: 7.34 — LOW (ref 7.35–7.45)
PH UR: 5.5 — SIGNIFICANT CHANGE UP (ref 5–8)
PH UR: 6 — SIGNIFICANT CHANGE UP (ref 5–8)
PHENYTOIN FREE SERPL-MCNC: 10.9 UG/ML — SIGNIFICANT CHANGE UP (ref 10–20)
PHENYTOIN FREE SERPL-MCNC: <1 UG/ML — LOW (ref 10–20)
PHOSPHATE SERPL-MCNC: 2.2 MG/DL — LOW (ref 2.5–4.5)
PHOSPHATE SERPL-MCNC: 2.5 MG/DL — SIGNIFICANT CHANGE UP (ref 2.5–4.5)
PHOSPHATE SERPL-MCNC: 2.7 MG/DL — SIGNIFICANT CHANGE UP (ref 2.5–4.5)
PHOSPHATE SERPL-MCNC: 2.7 MG/DL — SIGNIFICANT CHANGE UP (ref 2.5–4.5)
PHOSPHATE SERPL-MCNC: 3.1 MG/DL — SIGNIFICANT CHANGE UP (ref 2.5–4.5)
PHOSPHATE SERPL-MCNC: 3.2 MG/DL — SIGNIFICANT CHANGE UP (ref 2.5–4.5)
PHOSPHATE SERPL-MCNC: 3.6 MG/DL — SIGNIFICANT CHANGE UP (ref 2.5–4.5)
PHOSPHATE SERPL-MCNC: 4.1 MG/DL — SIGNIFICANT CHANGE UP (ref 2.5–4.5)
PLAT MORPH BLD: ABNORMAL
PLATELET # BLD AUTO: 128 K/UL — LOW (ref 150–400)
PLATELET # BLD AUTO: 143 K/UL — LOW (ref 150–400)
PLATELET # BLD AUTO: 162 K/UL — SIGNIFICANT CHANGE UP (ref 150–400)
PLATELET # BLD AUTO: 163 K/UL — SIGNIFICANT CHANGE UP (ref 150–400)
PLATELET # BLD AUTO: 163 K/UL — SIGNIFICANT CHANGE UP (ref 150–400)
PLATELET # BLD AUTO: 209 K/UL — SIGNIFICANT CHANGE UP (ref 150–400)
PO2 BLDV: 23 MMHG — LOW (ref 25–45)
POIKILOCYTOSIS BLD QL AUTO: SLIGHT — SIGNIFICANT CHANGE UP
POLYCHROMASIA BLD QL SMEAR: SLIGHT — SIGNIFICANT CHANGE UP
POTASSIUM BLDV-SCNC: 4 MMOL/L — SIGNIFICANT CHANGE UP (ref 3.5–5.3)
POTASSIUM SERPL-MCNC: 3.2 MMOL/L — LOW (ref 3.5–5.3)
POTASSIUM SERPL-MCNC: 3.3 MMOL/L — LOW (ref 3.5–5.3)
POTASSIUM SERPL-MCNC: 3.6 MMOL/L — SIGNIFICANT CHANGE UP (ref 3.5–5.3)
POTASSIUM SERPL-MCNC: 3.7 MMOL/L — SIGNIFICANT CHANGE UP (ref 3.5–5.3)
POTASSIUM SERPL-MCNC: 3.8 MMOL/L — SIGNIFICANT CHANGE UP (ref 3.5–5.3)
POTASSIUM SERPL-MCNC: 3.9 MMOL/L — SIGNIFICANT CHANGE UP (ref 3.5–5.3)
POTASSIUM SERPL-MCNC: 4 MMOL/L — SIGNIFICANT CHANGE UP (ref 3.5–5.3)
POTASSIUM SERPL-MCNC: 4.1 MMOL/L — SIGNIFICANT CHANGE UP (ref 3.5–5.3)
POTASSIUM SERPL-MCNC: 4.1 MMOL/L — SIGNIFICANT CHANGE UP (ref 3.5–5.3)
POTASSIUM SERPL-MCNC: 4.2 MMOL/L — SIGNIFICANT CHANGE UP (ref 3.5–5.3)
POTASSIUM SERPL-MCNC: 4.5 MMOL/L — SIGNIFICANT CHANGE UP (ref 3.5–5.3)
POTASSIUM SERPL-MCNC: 4.6 MMOL/L — SIGNIFICANT CHANGE UP (ref 3.5–5.3)
POTASSIUM SERPL-MCNC: 5.6 MMOL/L — HIGH (ref 3.5–5.3)
POTASSIUM SERPL-SCNC: 3.2 MMOL/L — LOW (ref 3.5–5.3)
POTASSIUM SERPL-SCNC: 3.3 MMOL/L — LOW (ref 3.5–5.3)
POTASSIUM SERPL-SCNC: 3.6 MMOL/L — SIGNIFICANT CHANGE UP (ref 3.5–5.3)
POTASSIUM SERPL-SCNC: 3.7 MMOL/L — SIGNIFICANT CHANGE UP (ref 3.5–5.3)
POTASSIUM SERPL-SCNC: 3.8 MMOL/L — SIGNIFICANT CHANGE UP (ref 3.5–5.3)
POTASSIUM SERPL-SCNC: 3.9 MMOL/L — SIGNIFICANT CHANGE UP (ref 3.5–5.3)
POTASSIUM SERPL-SCNC: 4 MMOL/L — SIGNIFICANT CHANGE UP (ref 3.5–5.3)
POTASSIUM SERPL-SCNC: 4.1 MMOL/L — SIGNIFICANT CHANGE UP (ref 3.5–5.3)
POTASSIUM SERPL-SCNC: 4.1 MMOL/L — SIGNIFICANT CHANGE UP (ref 3.5–5.3)
POTASSIUM SERPL-SCNC: 4.2 MMOL/L — SIGNIFICANT CHANGE UP (ref 3.5–5.3)
POTASSIUM SERPL-SCNC: 4.5 MMOL/L — SIGNIFICANT CHANGE UP (ref 3.5–5.3)
POTASSIUM SERPL-SCNC: 4.6 MMOL/L — SIGNIFICANT CHANGE UP (ref 3.5–5.3)
POTASSIUM SERPL-SCNC: 5.6 MMOL/L — HIGH (ref 3.5–5.3)
PROCALCITONIN SERPL-MCNC: 0.04 NG/ML — SIGNIFICANT CHANGE UP (ref 0.02–0.1)
PROT SERPL-MCNC: 5.2 G/DL — LOW (ref 6–8.3)
PROT SERPL-MCNC: 6 G/DL — SIGNIFICANT CHANGE UP (ref 6–8.3)
PROT SERPL-MCNC: 6.1 G/DL — SIGNIFICANT CHANGE UP (ref 6–8.3)
PROT SERPL-MCNC: 6.4 G/DL — SIGNIFICANT CHANGE UP (ref 6–8.3)
PROT SERPL-MCNC: 7 G/DL — SIGNIFICANT CHANGE UP (ref 6–8.3)
PROT UR-MCNC: ABNORMAL
PROT UR-MCNC: NEGATIVE — SIGNIFICANT CHANGE UP
PROTHROM AB SERPL-ACNC: 14 SEC — HIGH (ref 10.6–13.6)
PROTHROM AB SERPL-ACNC: 15.3 SEC — HIGH (ref 10.6–13.6)
RBC # BLD: 4.21 M/UL — SIGNIFICANT CHANGE UP (ref 3.8–5.2)
RBC # BLD: 4.22 M/UL — SIGNIFICANT CHANGE UP (ref 3.8–5.2)
RBC # BLD: 4.33 M/UL — SIGNIFICANT CHANGE UP (ref 3.8–5.2)
RBC # BLD: 4.5 M/UL — SIGNIFICANT CHANGE UP (ref 3.8–5.2)
RBC # BLD: 4.5 M/UL — SIGNIFICANT CHANGE UP (ref 3.8–5.2)
RBC # BLD: 4.86 M/UL — SIGNIFICANT CHANGE UP (ref 3.8–5.2)
RBC # FLD: 13 % — SIGNIFICANT CHANGE UP (ref 10.3–14.5)
RBC # FLD: 13.2 % — SIGNIFICANT CHANGE UP (ref 10.3–14.5)
RBC # FLD: 13.4 % — SIGNIFICANT CHANGE UP (ref 10.3–14.5)
RBC # FLD: 13.5 % — SIGNIFICANT CHANGE UP (ref 10.3–14.5)
RBC # FLD: 14.1 % — SIGNIFICANT CHANGE UP (ref 10.3–14.5)
RBC # FLD: 14.4 % — SIGNIFICANT CHANGE UP (ref 10.3–14.5)
RBC BLD AUTO: ABNORMAL
RBC CASTS # UR COMP ASSIST: 5 /HPF — HIGH (ref 0–4)
SAO2 % BLDV: 35 % — LOW (ref 67–88)
SARS-COV-2 IGG SERPL QL IA: NEGATIVE — SIGNIFICANT CHANGE UP
SARS-COV-2 IGG+IGM SERPL QL IA: 182 U/ML — HIGH
SARS-COV-2 IGG+IGM SERPL QL IA: POSITIVE
SARS-COV-2 IGM SERPL IA-ACNC: 0.06 INDEX — SIGNIFICANT CHANGE UP
SARS-COV-2 RNA SPEC QL NAA+PROBE: SIGNIFICANT CHANGE UP
SODIUM SERPL-SCNC: 138 MMOL/L — SIGNIFICANT CHANGE UP (ref 135–145)
SODIUM SERPL-SCNC: 141 MMOL/L — SIGNIFICANT CHANGE UP (ref 135–145)
SODIUM SERPL-SCNC: 141 MMOL/L — SIGNIFICANT CHANGE UP (ref 135–145)
SODIUM SERPL-SCNC: 143 MMOL/L — SIGNIFICANT CHANGE UP (ref 135–145)
SODIUM SERPL-SCNC: 145 MMOL/L — SIGNIFICANT CHANGE UP (ref 135–145)
SODIUM SERPL-SCNC: 145 MMOL/L — SIGNIFICANT CHANGE UP (ref 135–145)
SODIUM SERPL-SCNC: 146 MMOL/L — HIGH (ref 135–145)
SODIUM SERPL-SCNC: 147 MMOL/L — HIGH (ref 135–145)
SODIUM SERPL-SCNC: 151 MMOL/L — HIGH (ref 135–145)
SODIUM SERPL-SCNC: 152 MMOL/L — HIGH (ref 135–145)
SODIUM SERPL-SCNC: 157 MMOL/L — HIGH (ref 135–145)
SODIUM SERPL-SCNC: 160 MMOL/L — CRITICAL HIGH (ref 135–145)
SODIUM SERPL-SCNC: 160 MMOL/L — CRITICAL HIGH (ref 135–145)
SODIUM SERPL-SCNC: 163 MMOL/L — CRITICAL HIGH (ref 135–145)
SODIUM SERPL-SCNC: 165 MMOL/L — CRITICAL HIGH (ref 135–145)
SODIUM SERPL-SCNC: 166 MMOL/L — CRITICAL HIGH (ref 135–145)
SODIUM SERPL-SCNC: 166 MMOL/L — CRITICAL HIGH (ref 135–145)
SP GR SPEC: 1.02 — SIGNIFICANT CHANGE UP (ref 1.01–1.02)
SP GR SPEC: 1.03 — HIGH (ref 1.01–1.02)
SPECIMEN SOURCE: SIGNIFICANT CHANGE UP
TSH SERPL-MCNC: 2.37 UIU/ML — SIGNIFICANT CHANGE UP (ref 0.27–4.2)
TSH SERPL-MCNC: 3.5 UIU/ML — SIGNIFICANT CHANGE UP (ref 0.27–4.2)
UROBILINOGEN FLD QL: ABNORMAL
UROBILINOGEN FLD QL: NEGATIVE — SIGNIFICANT CHANGE UP
VANCOMYCIN TROUGH SERPL-MCNC: 5 UG/ML — LOW (ref 10–20)
VARIANT LYMPHS # BLD: 0.9 % — SIGNIFICANT CHANGE UP (ref 0–6)
VIT B12 SERPL-MCNC: 606 PG/ML — SIGNIFICANT CHANGE UP (ref 232–1245)
WBC # BLD: 4.65 K/UL — SIGNIFICANT CHANGE UP (ref 3.8–10.5)
WBC # BLD: 4.74 K/UL — SIGNIFICANT CHANGE UP (ref 3.8–10.5)
WBC # BLD: 5.56 K/UL — SIGNIFICANT CHANGE UP (ref 3.8–10.5)
WBC # BLD: 5.61 K/UL — SIGNIFICANT CHANGE UP (ref 3.8–10.5)
WBC # BLD: 9.45 K/UL — SIGNIFICANT CHANGE UP (ref 3.8–10.5)
WBC # BLD: 9.78 K/UL — SIGNIFICANT CHANGE UP (ref 3.8–10.5)
WBC # FLD AUTO: 4.65 K/UL — SIGNIFICANT CHANGE UP (ref 3.8–10.5)
WBC # FLD AUTO: 4.74 K/UL — SIGNIFICANT CHANGE UP (ref 3.8–10.5)
WBC # FLD AUTO: 5.56 K/UL — SIGNIFICANT CHANGE UP (ref 3.8–10.5)
WBC # FLD AUTO: 5.61 K/UL — SIGNIFICANT CHANGE UP (ref 3.8–10.5)
WBC # FLD AUTO: 9.45 K/UL — SIGNIFICANT CHANGE UP (ref 3.8–10.5)
WBC # FLD AUTO: 9.78 K/UL — SIGNIFICANT CHANGE UP (ref 3.8–10.5)
WBC UR QL: 5 /HPF — SIGNIFICANT CHANGE UP (ref 0–5)

## 2021-01-01 PROCEDURE — 96374 THER/PROPH/DIAG INJ IV PUSH: CPT

## 2021-01-01 PROCEDURE — 84132 ASSAY OF SERUM POTASSIUM: CPT

## 2021-01-01 PROCEDURE — 70450 CT HEAD/BRAIN W/O DYE: CPT

## 2021-01-01 PROCEDURE — 85610 PROTHROMBIN TIME: CPT

## 2021-01-01 PROCEDURE — 93010 ELECTROCARDIOGRAM REPORT: CPT

## 2021-01-01 PROCEDURE — 73630 X-RAY EXAM OF FOOT: CPT

## 2021-01-01 PROCEDURE — 82803 BLOOD GASES ANY COMBINATION: CPT

## 2021-01-01 PROCEDURE — 85027 COMPLETE CBC AUTOMATED: CPT

## 2021-01-01 PROCEDURE — 82607 VITAMIN B-12: CPT

## 2021-01-01 PROCEDURE — 70553 MRI BRAIN STEM W/O & W/DYE: CPT | Mod: 26

## 2021-01-01 PROCEDURE — 80177 DRUG SCRN QUAN LEVETIRACETAM: CPT

## 2021-01-01 PROCEDURE — 85025 COMPLETE CBC W/AUTO DIFF WBC: CPT

## 2021-01-01 PROCEDURE — 70553 MRI BRAIN STEM W/O & W/DYE: CPT

## 2021-01-01 PROCEDURE — 83605 ASSAY OF LACTIC ACID: CPT

## 2021-01-01 PROCEDURE — 83735 ASSAY OF MAGNESIUM: CPT

## 2021-01-01 PROCEDURE — 99284 EMERGENCY DEPT VISIT MOD MDM: CPT | Mod: CS

## 2021-01-01 PROCEDURE — 99232 SBSQ HOSP IP/OBS MODERATE 35: CPT

## 2021-01-01 PROCEDURE — 82565 ASSAY OF CREATININE: CPT

## 2021-01-01 PROCEDURE — 73720 MRI LWR EXTREMITY W/O&W/DYE: CPT | Mod: 26,LT

## 2021-01-01 PROCEDURE — 99233 SBSQ HOSP IP/OBS HIGH 50: CPT

## 2021-01-01 PROCEDURE — 99285 EMERGENCY DEPT VISIT HI MDM: CPT | Mod: CS,25,GC

## 2021-01-01 PROCEDURE — 85018 HEMOGLOBIN: CPT

## 2021-01-01 PROCEDURE — 80048 BASIC METABOLIC PNL TOTAL CA: CPT

## 2021-01-01 PROCEDURE — 99221 1ST HOSP IP/OBS SF/LOW 40: CPT

## 2021-01-01 PROCEDURE — 82947 ASSAY GLUCOSE BLOOD QUANT: CPT

## 2021-01-01 PROCEDURE — 83690 ASSAY OF LIPASE: CPT

## 2021-01-01 PROCEDURE — 73720 MRI LWR EXTREMITY W/O&W/DYE: CPT | Mod: 26,RT,77

## 2021-01-01 PROCEDURE — 97530 THERAPEUTIC ACTIVITIES: CPT

## 2021-01-01 PROCEDURE — 71250 CT THORAX DX C-: CPT | Mod: 26

## 2021-01-01 PROCEDURE — 99233 SBSQ HOSP IP/OBS HIGH 50: CPT | Mod: GC

## 2021-01-01 PROCEDURE — 93308 TTE F-UP OR LMTD: CPT

## 2021-01-01 PROCEDURE — 99232 SBSQ HOSP IP/OBS MODERATE 35: CPT | Mod: GC

## 2021-01-01 PROCEDURE — 84100 ASSAY OF PHOSPHORUS: CPT

## 2021-01-01 PROCEDURE — 84295 ASSAY OF SERUM SODIUM: CPT

## 2021-01-01 PROCEDURE — 71045 X-RAY EXAM CHEST 1 VIEW: CPT | Mod: 26

## 2021-01-01 PROCEDURE — 82962 GLUCOSE BLOOD TEST: CPT

## 2021-01-01 PROCEDURE — 99222 1ST HOSP IP/OBS MODERATE 55: CPT

## 2021-01-01 PROCEDURE — 86140 C-REACTIVE PROTEIN: CPT

## 2021-01-01 PROCEDURE — 73630 X-RAY EXAM OF FOOT: CPT | Mod: 26,50

## 2021-01-01 PROCEDURE — 99221 1ST HOSP IP/OBS SF/LOW 40: CPT | Mod: GC

## 2021-01-01 PROCEDURE — 71045 X-RAY EXAM CHEST 1 VIEW: CPT

## 2021-01-01 PROCEDURE — 85652 RBC SED RATE AUTOMATED: CPT

## 2021-01-01 PROCEDURE — 85014 HEMATOCRIT: CPT

## 2021-01-01 PROCEDURE — 99285 EMERGENCY DEPT VISIT HI MDM: CPT | Mod: 25

## 2021-01-01 PROCEDURE — 87635 SARS-COV-2 COVID-19 AMP PRB: CPT

## 2021-01-01 PROCEDURE — 82435 ASSAY OF BLOOD CHLORIDE: CPT

## 2021-01-01 PROCEDURE — 80185 ASSAY OF PHENYTOIN TOTAL: CPT

## 2021-01-01 PROCEDURE — 80053 COMPREHEN METABOLIC PANEL: CPT

## 2021-01-01 PROCEDURE — 70450 CT HEAD/BRAIN W/O DYE: CPT | Mod: 26

## 2021-01-01 PROCEDURE — 82330 ASSAY OF CALCIUM: CPT

## 2021-01-01 PROCEDURE — 81001 URINALYSIS AUTO W/SCOPE: CPT

## 2021-01-01 PROCEDURE — 94640 AIRWAY INHALATION TREATMENT: CPT

## 2021-01-01 PROCEDURE — 76937 US GUIDE VASCULAR ACCESS: CPT

## 2021-01-01 PROCEDURE — 82746 ASSAY OF FOLIC ACID SERUM: CPT

## 2021-01-01 PROCEDURE — U0005: CPT

## 2021-01-01 PROCEDURE — 99223 1ST HOSP IP/OBS HIGH 75: CPT

## 2021-01-01 PROCEDURE — 86769 SARS-COV-2 COVID-19 ANTIBODY: CPT

## 2021-01-01 PROCEDURE — 99239 HOSP IP/OBS DSCHRG MGMT >30: CPT

## 2021-01-01 PROCEDURE — 97110 THERAPEUTIC EXERCISES: CPT

## 2021-01-01 PROCEDURE — 85730 THROMBOPLASTIN TIME PARTIAL: CPT

## 2021-01-01 PROCEDURE — 80202 ASSAY OF VANCOMYCIN: CPT

## 2021-01-01 PROCEDURE — 84443 ASSAY THYROID STIM HORMONE: CPT

## 2021-01-01 PROCEDURE — 93010 ELECTROCARDIOGRAM REPORT: CPT | Mod: GC

## 2021-01-01 PROCEDURE — 83036 HEMOGLOBIN GLYCOSYLATED A1C: CPT

## 2021-01-01 PROCEDURE — 80076 HEPATIC FUNCTION PANEL: CPT

## 2021-01-01 PROCEDURE — 83090 ASSAY OF HOMOCYSTEINE: CPT

## 2021-01-01 PROCEDURE — U0003: CPT

## 2021-01-01 PROCEDURE — 99285 EMERGENCY DEPT VISIT HI MDM: CPT

## 2021-01-01 PROCEDURE — 93308 TTE F-UP OR LMTD: CPT | Mod: 26,59

## 2021-01-01 PROCEDURE — 97162 PT EVAL MOD COMPLEX 30 MIN: CPT

## 2021-01-01 PROCEDURE — A9585: CPT

## 2021-01-01 PROCEDURE — 84145 PROCALCITONIN (PCT): CPT

## 2021-01-01 PROCEDURE — 36000 PLACE NEEDLE IN VEIN: CPT | Mod: 59

## 2021-01-01 PROCEDURE — 87040 BLOOD CULTURE FOR BACTERIA: CPT

## 2021-01-01 PROCEDURE — 71250 CT THORAX DX C-: CPT

## 2021-01-01 PROCEDURE — 73720 MRI LWR EXTREMITY W/O&W/DYE: CPT

## 2021-01-01 PROCEDURE — 76937 US GUIDE VASCULAR ACCESS: CPT | Mod: 26,59

## 2021-01-01 PROCEDURE — 87086 URINE CULTURE/COLONY COUNT: CPT

## 2021-01-01 PROCEDURE — 99223 1ST HOSP IP/OBS HIGH 75: CPT | Mod: GC

## 2021-01-01 RX ORDER — AZTREONAM 2 G
VIAL (EA) INJECTION
Refills: 0 | Status: DISCONTINUED | OUTPATIENT
Start: 2021-01-01 | End: 2021-01-01

## 2021-01-01 RX ORDER — PIPERACILLIN AND TAZOBACTAM 4; .5 G/20ML; G/20ML
3.38 INJECTION, POWDER, LYOPHILIZED, FOR SOLUTION INTRAVENOUS ONCE
Refills: 0 | Status: COMPLETED | OUTPATIENT
Start: 2021-01-01 | End: 2021-01-01

## 2021-01-01 RX ORDER — MUPIROCIN 20 MG/G
1 OINTMENT TOPICAL
Qty: 0 | Refills: 0 | DISCHARGE

## 2021-01-01 RX ORDER — POTASSIUM CHLORIDE 20 MEQ
40 PACKET (EA) ORAL ONCE
Refills: 0 | Status: COMPLETED | OUTPATIENT
Start: 2021-01-01 | End: 2021-01-01

## 2021-01-01 RX ORDER — SODIUM CHLORIDE 9 MG/ML
1000 INJECTION, SOLUTION INTRAVENOUS
Refills: 0 | Status: DISCONTINUED | OUTPATIENT
Start: 2021-01-01 | End: 2021-01-01

## 2021-01-01 RX ORDER — CEFEPIME 1 G/1
1000 INJECTION, POWDER, FOR SOLUTION INTRAMUSCULAR; INTRAVENOUS EVERY 12 HOURS
Refills: 0 | Status: DISCONTINUED | OUTPATIENT
Start: 2021-01-01 | End: 2021-01-01

## 2021-01-01 RX ORDER — SODIUM CHLORIDE 9 MG/ML
50 INJECTION, SOLUTION INTRAVENOUS
Qty: 0 | Refills: 0 | DISCHARGE
Start: 2021-01-01 | End: 2021-01-01

## 2021-01-01 RX ORDER — CEFEPIME 1 G/1
2000 INJECTION, POWDER, FOR SOLUTION INTRAMUSCULAR; INTRAVENOUS EVERY 12 HOURS
Refills: 0 | Status: DISCONTINUED | OUTPATIENT
Start: 2021-01-01 | End: 2021-01-01

## 2021-01-01 RX ORDER — METOPROLOL TARTRATE 50 MG
5 TABLET ORAL EVERY 6 HOURS
Refills: 0 | Status: DISCONTINUED | OUTPATIENT
Start: 2021-01-01 | End: 2021-01-01

## 2021-01-01 RX ORDER — MORPHINE SULFATE 50 MG/1
1 CAPSULE, EXTENDED RELEASE ORAL EVERY 8 HOURS
Refills: 0 | Status: DISCONTINUED | OUTPATIENT
Start: 2021-01-01 | End: 2021-01-01

## 2021-01-01 RX ORDER — FOSPHENYTOIN 50 MG/ML
100 INJECTION INTRAMUSCULAR; INTRAVENOUS EVERY 8 HOURS
Refills: 0 | Status: DISCONTINUED | OUTPATIENT
Start: 2021-01-01 | End: 2021-01-01

## 2021-01-01 RX ORDER — ROBINUL 0.2 MG/ML
0.4 INJECTION INTRAMUSCULAR; INTRAVENOUS EVERY 6 HOURS
Refills: 0 | Status: DISCONTINUED | OUTPATIENT
Start: 2021-01-01 | End: 2021-01-01

## 2021-01-01 RX ORDER — CLONAZEPAM 1 MG
0.5 TABLET ORAL
Refills: 0 | Status: DISCONTINUED | OUTPATIENT
Start: 2021-01-01 | End: 2021-01-01

## 2021-01-01 RX ORDER — ACETYLCYSTEINE 200 MG/ML
4 VIAL (ML) MISCELLANEOUS
Refills: 0 | Status: DISCONTINUED | OUTPATIENT
Start: 2021-01-01 | End: 2021-01-01

## 2021-01-01 RX ORDER — DEXTROSE 50 % IN WATER 50 %
25 SYRINGE (ML) INTRAVENOUS ONCE
Refills: 0 | Status: DISCONTINUED | OUTPATIENT
Start: 2021-01-01 | End: 2021-01-01

## 2021-01-01 RX ORDER — METOPROLOL TARTRATE 50 MG
25 TABLET ORAL
Refills: 0 | Status: DISCONTINUED | OUTPATIENT
Start: 2021-01-01 | End: 2021-01-01

## 2021-01-01 RX ORDER — LEVETIRACETAM 250 MG/1
7.5 TABLET, FILM COATED ORAL
Qty: 0 | Refills: 0 | DISCHARGE

## 2021-01-01 RX ORDER — COLLAGENASE CLOSTRIDIUM HIST. 250 UNIT/G
1 OINTMENT (GRAM) TOPICAL DAILY
Refills: 0 | Status: DISCONTINUED | OUTPATIENT
Start: 2021-01-01 | End: 2021-01-01

## 2021-01-01 RX ORDER — METOPROLOL TARTRATE 50 MG
1 TABLET ORAL
Qty: 0 | Refills: 0 | DISCHARGE

## 2021-01-01 RX ORDER — MORPHINE SULFATE 50 MG/1
0.5 CAPSULE, EXTENDED RELEASE ORAL
Qty: 100 | Refills: 0 | Status: DISCONTINUED | OUTPATIENT
Start: 2021-01-01 | End: 2021-01-01

## 2021-01-01 RX ORDER — LEVETIRACETAM 250 MG/1
750 TABLET, FILM COATED ORAL EVERY 12 HOURS
Refills: 0 | Status: DISCONTINUED | OUTPATIENT
Start: 2021-01-01 | End: 2021-01-01

## 2021-01-01 RX ORDER — AZTREONAM 2 G
1000 VIAL (EA) INJECTION EVERY 12 HOURS
Refills: 0 | Status: DISCONTINUED | OUTPATIENT
Start: 2021-01-01 | End: 2021-01-01

## 2021-01-01 RX ORDER — SODIUM CHLORIDE 9 MG/ML
500 INJECTION INTRAMUSCULAR; INTRAVENOUS; SUBCUTANEOUS ONCE
Refills: 0 | Status: COMPLETED | OUTPATIENT
Start: 2021-01-01 | End: 2021-01-01

## 2021-01-01 RX ORDER — METOPROLOL TARTRATE 50 MG
2.5 TABLET ORAL ONCE
Refills: 0 | Status: COMPLETED | OUTPATIENT
Start: 2021-01-01 | End: 2021-01-01

## 2021-01-01 RX ORDER — GLUCAGON INJECTION, SOLUTION 0.5 MG/.1ML
1 INJECTION, SOLUTION SUBCUTANEOUS ONCE
Refills: 0 | Status: DISCONTINUED | OUTPATIENT
Start: 2021-01-01 | End: 2021-01-01

## 2021-01-01 RX ORDER — SODIUM HYPOCHLORITE 0.125 %
1 SOLUTION, NON-ORAL MISCELLANEOUS
Qty: 0 | Refills: 0 | DISCHARGE

## 2021-01-01 RX ORDER — MORPHINE SULFATE 50 MG/1
4 CAPSULE, EXTENDED RELEASE ORAL
Refills: 0 | Status: DISCONTINUED | OUTPATIENT
Start: 2021-01-01 | End: 2021-01-01

## 2021-01-01 RX ORDER — MORPHINE SULFATE 50 MG/1
0.3 CAPSULE, EXTENDED RELEASE ORAL
Qty: 100 | Refills: 0 | Status: DISCONTINUED | OUTPATIENT
Start: 2021-01-01 | End: 2021-01-01

## 2021-01-01 RX ORDER — AZTREONAM 2 G
1000 VIAL (EA) INJECTION ONCE
Refills: 0 | Status: COMPLETED | OUTPATIENT
Start: 2021-01-01 | End: 2021-01-01

## 2021-01-01 RX ORDER — DOCUSATE SODIUM 100 MG
1 CAPSULE ORAL
Qty: 0 | Refills: 0 | DISCHARGE

## 2021-01-01 RX ORDER — DOCUSATE SODIUM 100 MG
2 CAPSULE ORAL
Qty: 0 | Refills: 0 | DISCHARGE

## 2021-01-01 RX ORDER — ACETAMINOPHEN 500 MG
750 TABLET ORAL ONCE
Refills: 0 | Status: COMPLETED | OUTPATIENT
Start: 2021-01-01 | End: 2021-01-01

## 2021-01-01 RX ORDER — ACETAMINOPHEN 500 MG
2 TABLET ORAL
Qty: 0 | Refills: 0 | DISCHARGE

## 2021-01-01 RX ORDER — DIPHENHYDRAMINE HCL 50 MG
25 CAPSULE ORAL ONCE
Refills: 0 | Status: DISCONTINUED | OUTPATIENT
Start: 2021-01-01 | End: 2021-01-01

## 2021-01-01 RX ORDER — VANCOMYCIN HCL 1 G
500 VIAL (EA) INTRAVENOUS EVERY 24 HOURS
Refills: 0 | Status: DISCONTINUED | OUTPATIENT
Start: 2021-01-01 | End: 2021-01-01

## 2021-01-01 RX ORDER — VANCOMYCIN HCL 1 G
1000 VIAL (EA) INTRAVENOUS ONCE
Refills: 0 | Status: COMPLETED | OUTPATIENT
Start: 2021-01-01 | End: 2021-01-01

## 2021-01-01 RX ORDER — LEVETIRACETAM 250 MG/1
750 TABLET, FILM COATED ORAL
Refills: 0 | Status: DISCONTINUED | OUTPATIENT
Start: 2021-01-01 | End: 2021-01-01

## 2021-01-01 RX ORDER — MORPHINE SULFATE 50 MG/1
1.5 CAPSULE, EXTENDED RELEASE ORAL
Qty: 100 | Refills: 0 | Status: DISCONTINUED | OUTPATIENT
Start: 2021-01-01 | End: 2021-01-01

## 2021-01-01 RX ORDER — COLLAGENASE CLOSTRIDIUM HIST. 250 UNIT/G
1 OINTMENT (GRAM) TOPICAL
Qty: 0 | Refills: 0 | DISCHARGE

## 2021-01-01 RX ORDER — INSULIN LISPRO 100/ML
6 VIAL (ML) SUBCUTANEOUS ONCE
Refills: 0 | Status: COMPLETED | OUTPATIENT
Start: 2021-01-01 | End: 2021-01-01

## 2021-01-01 RX ORDER — ACETAMINOPHEN 500 MG
1000 TABLET ORAL ONCE
Refills: 0 | Status: DISCONTINUED | OUTPATIENT
Start: 2021-01-01 | End: 2021-01-01

## 2021-01-01 RX ORDER — LEVETIRACETAM 250 MG/1
500 TABLET, FILM COATED ORAL
Refills: 0 | Status: DISCONTINUED | OUTPATIENT
Start: 2021-01-01 | End: 2021-01-01

## 2021-01-01 RX ORDER — ACETAMINOPHEN 500 MG
750 TABLET ORAL ONCE
Refills: 0 | Status: DISCONTINUED | OUTPATIENT
Start: 2021-01-01 | End: 2021-01-01

## 2021-01-01 RX ORDER — DEXTROSE 50 % IN WATER 50 %
12.5 SYRINGE (ML) INTRAVENOUS ONCE
Refills: 0 | Status: DISCONTINUED | OUTPATIENT
Start: 2021-01-01 | End: 2021-01-01

## 2021-01-01 RX ORDER — SENNA PLUS 8.6 MG/1
2 TABLET ORAL AT BEDTIME
Refills: 0 | Status: DISCONTINUED | OUTPATIENT
Start: 2021-01-01 | End: 2021-01-01

## 2021-01-01 RX ORDER — MORPHINE SULFATE 50 MG/1
2 CAPSULE, EXTENDED RELEASE ORAL
Refills: 0 | Status: DISCONTINUED | OUTPATIENT
Start: 2021-01-01 | End: 2021-01-01

## 2021-01-01 RX ORDER — MORPHINE SULFATE 50 MG/1
1 CAPSULE, EXTENDED RELEASE ORAL
Refills: 0 | Status: DISCONTINUED | OUTPATIENT
Start: 2021-01-01 | End: 2021-01-01

## 2021-01-01 RX ORDER — MORPHINE SULFATE 50 MG/1
2 CAPSULE, EXTENDED RELEASE ORAL
Qty: 100 | Refills: 0 | Status: DISCONTINUED | OUTPATIENT
Start: 2021-01-01 | End: 2021-01-01

## 2021-01-01 RX ORDER — ACETAMINOPHEN 500 MG
650 TABLET ORAL EVERY 6 HOURS
Refills: 0 | Status: DISCONTINUED | OUTPATIENT
Start: 2021-01-01 | End: 2021-01-01

## 2021-01-01 RX ORDER — SODIUM CHLORIDE 9 MG/ML
1000 INJECTION INTRAMUSCULAR; INTRAVENOUS; SUBCUTANEOUS
Refills: 0 | Status: DISCONTINUED | OUTPATIENT
Start: 2021-01-01 | End: 2021-01-01

## 2021-01-01 RX ORDER — MORPHINE SULFATE 50 MG/1
1.5 CAPSULE, EXTENDED RELEASE ORAL
Refills: 0 | Status: DISCONTINUED | OUTPATIENT
Start: 2021-01-01 | End: 2021-01-01

## 2021-01-01 RX ORDER — SENNA PLUS 8.6 MG/1
2 TABLET ORAL
Qty: 0 | Refills: 0 | DISCHARGE

## 2021-01-01 RX ORDER — MORPHINE SULFATE 50 MG/1
1 CAPSULE, EXTENDED RELEASE ORAL
Qty: 100 | Refills: 0 | Status: DISCONTINUED | OUTPATIENT
Start: 2021-01-01 | End: 2021-01-01

## 2021-01-01 RX ORDER — SODIUM CHLORIDE 9 MG/ML
1000 INJECTION INTRAMUSCULAR; INTRAVENOUS; SUBCUTANEOUS ONCE
Refills: 0 | Status: COMPLETED | OUTPATIENT
Start: 2021-01-01 | End: 2021-01-01

## 2021-01-01 RX ORDER — APIXABAN 2.5 MG/1
1 TABLET, FILM COATED ORAL
Qty: 0 | Refills: 0 | DISCHARGE

## 2021-01-01 RX ORDER — METOPROLOL TARTRATE 50 MG
25 TABLET ORAL EVERY 12 HOURS
Refills: 0 | Status: DISCONTINUED | OUTPATIENT
Start: 2021-01-01 | End: 2021-01-01

## 2021-01-01 RX ORDER — SODIUM CHLORIDE 9 MG/ML
1000 INJECTION INTRAMUSCULAR; INTRAVENOUS; SUBCUTANEOUS ONCE
Refills: 0 | Status: DISCONTINUED | OUTPATIENT
Start: 2021-01-01 | End: 2021-01-01

## 2021-01-01 RX ORDER — LEVETIRACETAM 250 MG/1
500 TABLET, FILM COATED ORAL EVERY 12 HOURS
Refills: 0 | Status: DISCONTINUED | OUTPATIENT
Start: 2021-01-01 | End: 2021-01-01

## 2021-01-01 RX ORDER — DEXTROSE 50 % IN WATER 50 %
15 SYRINGE (ML) INTRAVENOUS ONCE
Refills: 0 | Status: DISCONTINUED | OUTPATIENT
Start: 2021-01-01 | End: 2021-01-01

## 2021-01-01 RX ORDER — ACETAMINOPHEN 500 MG
1000 TABLET ORAL ONCE
Refills: 0 | Status: COMPLETED | OUTPATIENT
Start: 2021-01-01 | End: 2021-01-01

## 2021-01-01 RX ORDER — VANCOMYCIN HCL 1 G
500 VIAL (EA) INTRAVENOUS ONCE
Refills: 0 | Status: COMPLETED | OUTPATIENT
Start: 2021-01-01 | End: 2021-01-01

## 2021-01-01 RX ORDER — FUROSEMIDE 40 MG
1 TABLET ORAL
Qty: 0 | Refills: 0 | DISCHARGE

## 2021-01-01 RX ORDER — MUPIROCIN 20 MG/G
1 OINTMENT TOPICAL EVERY 12 HOURS
Refills: 0 | Status: DISCONTINUED | OUTPATIENT
Start: 2021-01-01 | End: 2021-01-01

## 2021-01-01 RX ORDER — VANCOMYCIN HCL 1 G
1000 VIAL (EA) INTRAVENOUS EVERY 24 HOURS
Refills: 0 | Status: DISCONTINUED | OUTPATIENT
Start: 2021-01-01 | End: 2021-01-01

## 2021-01-01 RX ORDER — INSULIN ASPART 100 [IU]/ML
0 INJECTION, SOLUTION SUBCUTANEOUS
Qty: 0 | Refills: 0 | DISCHARGE

## 2021-01-01 RX ORDER — POTASSIUM CHLORIDE 20 MEQ
20 PACKET (EA) ORAL ONCE
Refills: 0 | Status: COMPLETED | OUTPATIENT
Start: 2021-01-01 | End: 2021-01-01

## 2021-01-01 RX ORDER — INSULIN LISPRO 100/ML
VIAL (ML) SUBCUTANEOUS EVERY 6 HOURS
Refills: 0 | Status: DISCONTINUED | OUTPATIENT
Start: 2021-01-01 | End: 2021-01-01

## 2021-01-01 RX ORDER — VANCOMYCIN HCL 1 G
VIAL (EA) INTRAVENOUS
Refills: 0 | Status: DISCONTINUED | OUTPATIENT
Start: 2021-01-01 | End: 2021-01-01

## 2021-01-01 RX ORDER — FOSPHENYTOIN 50 MG/ML
100 INJECTION INTRAMUSCULAR; INTRAVENOUS THREE TIMES A DAY
Refills: 0 | Status: DISCONTINUED | OUTPATIENT
Start: 2021-01-01 | End: 2021-01-01

## 2021-01-01 RX ADMIN — FOSPHENYTOIN 104 MILLIGRAM(S) PE: 50 INJECTION INTRAMUSCULAR; INTRAVENOUS at 21:48

## 2021-01-01 RX ADMIN — Medication 5 MILLIGRAM(S): at 09:46

## 2021-01-01 RX ADMIN — FOSPHENYTOIN 104 MILLIGRAM(S) PE: 50 INJECTION INTRAMUSCULAR; INTRAVENOUS at 00:00

## 2021-01-01 RX ADMIN — Medication 650 MILLIGRAM(S): at 10:33

## 2021-01-01 RX ADMIN — Medication 1: at 23:47

## 2021-01-01 RX ADMIN — FOSPHENYTOIN 104 MILLIGRAM(S) PE: 50 INJECTION INTRAMUSCULAR; INTRAVENOUS at 14:08

## 2021-01-01 RX ADMIN — Medication 10 MILLIGRAM(S): at 05:45

## 2021-01-01 RX ADMIN — Medication 25 MILLIGRAM(S): at 17:45

## 2021-01-01 RX ADMIN — LEVETIRACETAM 400 MILLIGRAM(S): 250 TABLET, FILM COATED ORAL at 10:05

## 2021-01-01 RX ADMIN — FOSPHENYTOIN 104 MILLIGRAM(S) PE: 50 INJECTION INTRAMUSCULAR; INTRAVENOUS at 21:56

## 2021-01-01 RX ADMIN — Medication 4 MILLILITER(S): at 17:28

## 2021-01-01 RX ADMIN — Medication 2: at 17:42

## 2021-01-01 RX ADMIN — Medication 4 MILLILITER(S): at 12:46

## 2021-01-01 RX ADMIN — SODIUM CHLORIDE 50 MILLILITER(S): 9 INJECTION, SOLUTION INTRAVENOUS at 22:06

## 2021-01-01 RX ADMIN — LEVETIRACETAM 400 MILLIGRAM(S): 250 TABLET, FILM COATED ORAL at 08:43

## 2021-01-01 RX ADMIN — Medication 100 MILLIGRAM(S): at 18:53

## 2021-01-01 RX ADMIN — PIPERACILLIN AND TAZOBACTAM 200 GRAM(S): 4; .5 INJECTION, POWDER, LYOPHILIZED, FOR SOLUTION INTRAVENOUS at 18:30

## 2021-01-01 RX ADMIN — Medication 1: at 12:52

## 2021-01-01 RX ADMIN — MORPHINE SULFATE 1 MG/HR: 50 CAPSULE, EXTENDED RELEASE ORAL at 10:12

## 2021-01-01 RX ADMIN — Medication 50 MILLIGRAM(S): at 06:21

## 2021-01-01 RX ADMIN — FOSPHENYTOIN 104 MILLIGRAM(S) PE: 50 INJECTION INTRAMUSCULAR; INTRAVENOUS at 05:04

## 2021-01-01 RX ADMIN — ROBINUL 0.4 MILLIGRAM(S): 0.2 INJECTION INTRAMUSCULAR; INTRAVENOUS at 03:51

## 2021-01-01 RX ADMIN — Medication 25 MILLIGRAM(S): at 17:37

## 2021-01-01 RX ADMIN — Medication 25 MILLIGRAM(S): at 17:29

## 2021-01-01 RX ADMIN — LEVETIRACETAM 400 MILLIGRAM(S): 250 TABLET, FILM COATED ORAL at 05:10

## 2021-01-01 RX ADMIN — ROBINUL 0.4 MILLIGRAM(S): 0.2 INJECTION INTRAMUSCULAR; INTRAVENOUS at 17:14

## 2021-01-01 RX ADMIN — Medication 4 MILLILITER(S): at 06:06

## 2021-01-01 RX ADMIN — Medication 1: at 12:16

## 2021-01-01 RX ADMIN — Medication 100 MILLIGRAM(S): at 20:22

## 2021-01-01 RX ADMIN — Medication 50 MILLIGRAM(S): at 06:23

## 2021-01-01 RX ADMIN — Medication 4 MILLILITER(S): at 00:10

## 2021-01-01 RX ADMIN — LEVETIRACETAM 400 MILLIGRAM(S): 250 TABLET, FILM COATED ORAL at 10:27

## 2021-01-01 RX ADMIN — SENNA PLUS 2 TABLET(S): 8.6 TABLET ORAL at 21:48

## 2021-01-01 RX ADMIN — MORPHINE SULFATE 2 MG/HR: 50 CAPSULE, EXTENDED RELEASE ORAL at 19:49

## 2021-01-01 RX ADMIN — Medication 4 MILLILITER(S): at 13:06

## 2021-01-01 RX ADMIN — Medication 650 MILLIGRAM(S): at 07:27

## 2021-01-01 RX ADMIN — Medication 4 MILLILITER(S): at 00:07

## 2021-01-01 RX ADMIN — SODIUM CHLORIDE 60 MILLILITER(S): 9 INJECTION, SOLUTION INTRAVENOUS at 19:41

## 2021-01-01 RX ADMIN — CEFEPIME 100 MILLIGRAM(S): 1 INJECTION, POWDER, FOR SOLUTION INTRAMUSCULAR; INTRAVENOUS at 22:05

## 2021-01-01 RX ADMIN — Medication 4 MILLILITER(S): at 13:02

## 2021-01-01 RX ADMIN — MORPHINE SULFATE 2 MILLIGRAM(S): 50 CAPSULE, EXTENDED RELEASE ORAL at 12:51

## 2021-01-01 RX ADMIN — Medication 4 MILLILITER(S): at 23:38

## 2021-01-01 RX ADMIN — Medication 4 MILLILITER(S): at 12:03

## 2021-01-01 RX ADMIN — Medication 4 MILLILITER(S): at 19:04

## 2021-01-01 RX ADMIN — FOSPHENYTOIN 104 MILLIGRAM(S) PE: 50 INJECTION INTRAMUSCULAR; INTRAVENOUS at 21:38

## 2021-01-01 RX ADMIN — Medication 4 MILLILITER(S): at 11:44

## 2021-01-01 RX ADMIN — Medication 250 MILLIGRAM(S): at 21:48

## 2021-01-01 RX ADMIN — Medication 4 MILLILITER(S): at 05:57

## 2021-01-01 RX ADMIN — LEVETIRACETAM 400 MILLIGRAM(S): 250 TABLET, FILM COATED ORAL at 10:01

## 2021-01-01 RX ADMIN — Medication 50 MILLIGRAM(S): at 17:40

## 2021-01-01 RX ADMIN — ROBINUL 0.4 MILLIGRAM(S): 0.2 INJECTION INTRAMUSCULAR; INTRAVENOUS at 00:53

## 2021-01-01 RX ADMIN — Medication 25 MILLIGRAM(S): at 17:48

## 2021-01-01 RX ADMIN — LEVETIRACETAM 400 MILLIGRAM(S): 250 TABLET, FILM COATED ORAL at 21:18

## 2021-01-01 RX ADMIN — MORPHINE SULFATE 2 MILLIGRAM(S): 50 CAPSULE, EXTENDED RELEASE ORAL at 03:49

## 2021-01-01 RX ADMIN — Medication 1 APPLICATION(S): at 17:30

## 2021-01-01 RX ADMIN — Medication 5 MILLIGRAM(S): at 14:06

## 2021-01-01 RX ADMIN — MORPHINE SULFATE 1 MILLIGRAM(S): 50 CAPSULE, EXTENDED RELEASE ORAL at 00:50

## 2021-01-01 RX ADMIN — Medication 4 MILLILITER(S): at 23:29

## 2021-01-01 RX ADMIN — FOSPHENYTOIN 104 MILLIGRAM(S) PE: 50 INJECTION INTRAMUSCULAR; INTRAVENOUS at 22:15

## 2021-01-01 RX ADMIN — MORPHINE SULFATE 2 MG/HR: 50 CAPSULE, EXTENDED RELEASE ORAL at 17:15

## 2021-01-01 RX ADMIN — Medication 1: at 17:28

## 2021-01-01 RX ADMIN — Medication 650 MILLIGRAM(S): at 08:00

## 2021-01-01 RX ADMIN — Medication 50 MILLIGRAM(S): at 17:48

## 2021-01-01 RX ADMIN — LEVETIRACETAM 400 MILLIGRAM(S): 250 TABLET, FILM COATED ORAL at 21:38

## 2021-01-01 RX ADMIN — Medication 25 MILLIGRAM(S): at 06:24

## 2021-01-01 RX ADMIN — Medication 50 MILLIGRAM(S): at 17:46

## 2021-01-01 RX ADMIN — MORPHINE SULFATE 1 MILLIGRAM(S): 50 CAPSULE, EXTENDED RELEASE ORAL at 16:32

## 2021-01-01 RX ADMIN — Medication 6: at 23:33

## 2021-01-01 RX ADMIN — FOSPHENYTOIN 104 MILLIGRAM(S) PE: 50 INJECTION INTRAMUSCULAR; INTRAVENOUS at 13:06

## 2021-01-01 RX ADMIN — MUPIROCIN 1 APPLICATION(S): 20 OINTMENT TOPICAL at 05:33

## 2021-01-01 RX ADMIN — MORPHINE SULFATE 1 MILLIGRAM(S): 50 CAPSULE, EXTENDED RELEASE ORAL at 21:18

## 2021-01-01 RX ADMIN — Medication 100 MILLIGRAM(S): at 20:13

## 2021-01-01 RX ADMIN — SODIUM CHLORIDE 60 MILLILITER(S): 9 INJECTION, SOLUTION INTRAVENOUS at 19:21

## 2021-01-01 RX ADMIN — MORPHINE SULFATE 4 MILLIGRAM(S): 50 CAPSULE, EXTENDED RELEASE ORAL at 10:08

## 2021-01-01 RX ADMIN — MORPHINE SULFATE 1.5 MILLIGRAM(S): 50 CAPSULE, EXTENDED RELEASE ORAL at 17:54

## 2021-01-01 RX ADMIN — FOSPHENYTOIN 104 MILLIGRAM(S) PE: 50 INJECTION INTRAMUSCULAR; INTRAVENOUS at 05:58

## 2021-01-01 RX ADMIN — Medication 6 UNIT(S): at 23:51

## 2021-01-01 RX ADMIN — LEVETIRACETAM 400 MILLIGRAM(S): 250 TABLET, FILM COATED ORAL at 21:51

## 2021-01-01 RX ADMIN — MORPHINE SULFATE 1.5 MG/HR: 50 CAPSULE, EXTENDED RELEASE ORAL at 08:32

## 2021-01-01 RX ADMIN — MUPIROCIN 1 APPLICATION(S): 20 OINTMENT TOPICAL at 06:24

## 2021-01-01 RX ADMIN — SODIUM CHLORIDE 100 MILLILITER(S): 9 INJECTION, SOLUTION INTRAVENOUS at 12:37

## 2021-01-01 RX ADMIN — FOSPHENYTOIN 104 MILLIGRAM(S) PE: 50 INJECTION INTRAMUSCULAR; INTRAVENOUS at 17:06

## 2021-01-01 RX ADMIN — LEVETIRACETAM 400 MILLIGRAM(S): 250 TABLET, FILM COATED ORAL at 21:17

## 2021-01-01 RX ADMIN — FOSPHENYTOIN 104 MILLIGRAM(S) PE: 50 INJECTION INTRAMUSCULAR; INTRAVENOUS at 14:57

## 2021-01-01 RX ADMIN — MUPIROCIN 1 APPLICATION(S): 20 OINTMENT TOPICAL at 17:15

## 2021-01-01 RX ADMIN — Medication 1: at 05:31

## 2021-01-01 RX ADMIN — Medication 4 MILLILITER(S): at 05:41

## 2021-01-01 RX ADMIN — Medication 1: at 23:49

## 2021-01-01 RX ADMIN — MORPHINE SULFATE 2 MILLIGRAM(S): 50 CAPSULE, EXTENDED RELEASE ORAL at 14:12

## 2021-01-01 RX ADMIN — FOSPHENYTOIN 104 MILLIGRAM(S) PE: 50 INJECTION INTRAMUSCULAR; INTRAVENOUS at 13:28

## 2021-01-01 RX ADMIN — MORPHINE SULFATE 1 MG/HR: 50 CAPSULE, EXTENDED RELEASE ORAL at 14:12

## 2021-01-01 RX ADMIN — Medication 4 MILLILITER(S): at 12:34

## 2021-01-01 RX ADMIN — Medication 10 MILLIEQUIVALENT(S): at 14:28

## 2021-01-01 RX ADMIN — LEVETIRACETAM 750 MILLIGRAM(S): 250 TABLET, FILM COATED ORAL at 17:15

## 2021-01-01 RX ADMIN — FOSPHENYTOIN 104 MILLIGRAM(S) PE: 50 INJECTION INTRAMUSCULAR; INTRAVENOUS at 14:00

## 2021-01-01 RX ADMIN — SODIUM CHLORIDE 1000 MILLILITER(S): 9 INJECTION INTRAMUSCULAR; INTRAVENOUS; SUBCUTANEOUS at 14:42

## 2021-01-01 RX ADMIN — CEFEPIME 100 MILLIGRAM(S): 1 INJECTION, POWDER, FOR SOLUTION INTRAMUSCULAR; INTRAVENOUS at 17:29

## 2021-01-01 RX ADMIN — MORPHINE SULFATE 2 MILLIGRAM(S): 50 CAPSULE, EXTENDED RELEASE ORAL at 10:12

## 2021-01-01 RX ADMIN — FOSPHENYTOIN 104 MILLIGRAM(S) PE: 50 INJECTION INTRAMUSCULAR; INTRAVENOUS at 13:11

## 2021-01-01 RX ADMIN — MORPHINE SULFATE 1 MILLIGRAM(S): 50 CAPSULE, EXTENDED RELEASE ORAL at 00:35

## 2021-01-01 RX ADMIN — Medication 4 MILLILITER(S): at 06:20

## 2021-01-01 RX ADMIN — Medication 100 MILLIGRAM(S): at 21:07

## 2021-01-01 RX ADMIN — SENNA PLUS 2 TABLET(S): 8.6 TABLET ORAL at 20:22

## 2021-01-01 RX ADMIN — LEVETIRACETAM 400 MILLIGRAM(S): 250 TABLET, FILM COATED ORAL at 21:47

## 2021-01-01 RX ADMIN — FOSPHENYTOIN 104 MILLIGRAM(S) PE: 50 INJECTION INTRAMUSCULAR; INTRAVENOUS at 22:34

## 2021-01-01 RX ADMIN — LEVETIRACETAM 400 MILLIGRAM(S): 250 TABLET, FILM COATED ORAL at 08:47

## 2021-01-01 RX ADMIN — MORPHINE SULFATE 4 MILLIGRAM(S): 50 CAPSULE, EXTENDED RELEASE ORAL at 12:43

## 2021-01-01 RX ADMIN — Medication 4 MILLILITER(S): at 17:47

## 2021-01-01 RX ADMIN — Medication 3: at 12:46

## 2021-01-01 RX ADMIN — LEVETIRACETAM 400 MILLIGRAM(S): 250 TABLET, FILM COATED ORAL at 10:22

## 2021-01-01 RX ADMIN — Medication 0.5 MILLIGRAM(S): at 00:52

## 2021-01-01 RX ADMIN — SODIUM CHLORIDE 10 MILLILITER(S): 9 INJECTION INTRAMUSCULAR; INTRAVENOUS; SUBCUTANEOUS at 05:10

## 2021-01-01 RX ADMIN — Medication 25 MILLIGRAM(S): at 05:40

## 2021-01-01 RX ADMIN — Medication 1 APPLICATION(S): at 13:06

## 2021-01-01 RX ADMIN — SODIUM CHLORIDE 10 MILLILITER(S): 9 INJECTION INTRAMUSCULAR; INTRAVENOUS; SUBCUTANEOUS at 19:26

## 2021-01-01 RX ADMIN — Medication 4 MILLILITER(S): at 06:39

## 2021-01-01 RX ADMIN — Medication 25 MILLIGRAM(S): at 17:50

## 2021-01-01 RX ADMIN — Medication 25 MILLIGRAM(S): at 17:22

## 2021-01-01 RX ADMIN — Medication 100 MILLIGRAM(S): at 04:31

## 2021-01-01 RX ADMIN — Medication 4 MILLILITER(S): at 17:09

## 2021-01-01 RX ADMIN — LEVETIRACETAM 400 MILLIGRAM(S): 250 TABLET, FILM COATED ORAL at 20:14

## 2021-01-01 RX ADMIN — Medication 100 MILLIGRAM(S): at 18:06

## 2021-01-01 RX ADMIN — Medication 4 MILLILITER(S): at 00:20

## 2021-01-01 RX ADMIN — Medication 2: at 12:59

## 2021-01-01 RX ADMIN — MORPHINE SULFATE 1.5 MG/HR: 50 CAPSULE, EXTENDED RELEASE ORAL at 19:40

## 2021-01-01 RX ADMIN — MORPHINE SULFATE 0.5 MG/HR: 50 CAPSULE, EXTENDED RELEASE ORAL at 07:59

## 2021-01-01 RX ADMIN — FOSPHENYTOIN 104 MILLIGRAM(S) PE: 50 INJECTION INTRAMUSCULAR; INTRAVENOUS at 22:51

## 2021-01-01 RX ADMIN — SENNA PLUS 2 TABLET(S): 8.6 TABLET ORAL at 20:21

## 2021-01-01 RX ADMIN — CEFEPIME 100 MILLIGRAM(S): 1 INJECTION, POWDER, FOR SOLUTION INTRAMUSCULAR; INTRAVENOUS at 06:24

## 2021-01-01 RX ADMIN — FOSPHENYTOIN 104 MILLIGRAM(S) PE: 50 INJECTION INTRAMUSCULAR; INTRAVENOUS at 21:32

## 2021-01-01 RX ADMIN — Medication 4 MILLILITER(S): at 23:16

## 2021-01-01 RX ADMIN — FOSPHENYTOIN 104 MILLIGRAM(S) PE: 50 INJECTION INTRAMUSCULAR; INTRAVENOUS at 21:18

## 2021-01-01 RX ADMIN — FOSPHENYTOIN 104 MILLIGRAM(S) PE: 50 INJECTION INTRAMUSCULAR; INTRAVENOUS at 13:12

## 2021-01-01 RX ADMIN — MUPIROCIN 1 APPLICATION(S): 20 OINTMENT TOPICAL at 04:22

## 2021-01-01 RX ADMIN — Medication 1 APPLICATION(S): at 13:19

## 2021-01-01 RX ADMIN — MUPIROCIN 1 APPLICATION(S): 20 OINTMENT TOPICAL at 17:37

## 2021-01-01 RX ADMIN — Medication 0.5 MILLIGRAM(S): at 18:51

## 2021-01-01 RX ADMIN — LEVETIRACETAM 750 MILLIGRAM(S): 250 TABLET, FILM COATED ORAL at 04:31

## 2021-01-01 RX ADMIN — FOSPHENYTOIN 104 MILLIGRAM(S) PE: 50 INJECTION INTRAMUSCULAR; INTRAVENOUS at 14:01

## 2021-01-01 RX ADMIN — MORPHINE SULFATE 1.5 MILLIGRAM(S): 50 CAPSULE, EXTENDED RELEASE ORAL at 10:44

## 2021-01-01 RX ADMIN — Medication 5 MILLIGRAM(S): at 02:52

## 2021-01-01 RX ADMIN — SODIUM CHLORIDE 10 MILLILITER(S): 9 INJECTION INTRAMUSCULAR; INTRAVENOUS; SUBCUTANEOUS at 07:59

## 2021-01-01 RX ADMIN — Medication 5 MILLIGRAM(S): at 21:48

## 2021-01-01 RX ADMIN — Medication 5 MILLIGRAM(S): at 14:27

## 2021-01-01 RX ADMIN — SODIUM CHLORIDE 75 MILLILITER(S): 9 INJECTION, SOLUTION INTRAVENOUS at 08:14

## 2021-01-01 RX ADMIN — LEVETIRACETAM 400 MILLIGRAM(S): 250 TABLET, FILM COATED ORAL at 05:45

## 2021-01-01 RX ADMIN — FOSPHENYTOIN 104 MILLIGRAM(S) PE: 50 INJECTION INTRAMUSCULAR; INTRAVENOUS at 05:36

## 2021-01-01 RX ADMIN — Medication 25 MILLIGRAM(S): at 06:10

## 2021-01-01 RX ADMIN — FOSPHENYTOIN 104 MILLIGRAM(S) PE: 50 INJECTION INTRAMUSCULAR; INTRAVENOUS at 05:39

## 2021-01-01 RX ADMIN — Medication 100 MILLIGRAM(S): at 20:30

## 2021-01-01 RX ADMIN — Medication 0.5 MILLIGRAM(S): at 06:20

## 2021-01-01 RX ADMIN — CEFEPIME 100 MILLIGRAM(S): 1 INJECTION, POWDER, FOR SOLUTION INTRAMUSCULAR; INTRAVENOUS at 17:14

## 2021-01-01 RX ADMIN — Medication 4 MILLILITER(S): at 23:43

## 2021-01-01 RX ADMIN — MORPHINE SULFATE 0.5 MG/HR: 50 CAPSULE, EXTENDED RELEASE ORAL at 01:05

## 2021-01-01 RX ADMIN — FOSPHENYTOIN 104 MILLIGRAM(S) PE: 50 INJECTION INTRAMUSCULAR; INTRAVENOUS at 22:58

## 2021-01-01 RX ADMIN — Medication 4 MILLILITER(S): at 23:51

## 2021-01-01 RX ADMIN — FOSPHENYTOIN 104 MILLIGRAM(S) PE: 50 INJECTION INTRAMUSCULAR; INTRAVENOUS at 06:23

## 2021-01-01 RX ADMIN — FOSPHENYTOIN 104 MILLIGRAM(S) PE: 50 INJECTION INTRAMUSCULAR; INTRAVENOUS at 07:26

## 2021-01-01 RX ADMIN — Medication 300 MILLIGRAM(S): at 13:55

## 2021-01-01 RX ADMIN — Medication 4 MILLILITER(S): at 17:48

## 2021-01-01 RX ADMIN — LEVETIRACETAM 750 MILLIGRAM(S): 250 TABLET, FILM COATED ORAL at 17:37

## 2021-01-01 RX ADMIN — MORPHINE SULFATE 1.5 MILLIGRAM(S): 50 CAPSULE, EXTENDED RELEASE ORAL at 08:10

## 2021-01-01 RX ADMIN — SODIUM CHLORIDE 100 MILLILITER(S): 9 INJECTION, SOLUTION INTRAVENOUS at 09:00

## 2021-01-01 RX ADMIN — LEVETIRACETAM 400 MILLIGRAM(S): 250 TABLET, FILM COATED ORAL at 17:15

## 2021-01-01 RX ADMIN — FOSPHENYTOIN 104 MILLIGRAM(S) PE: 50 INJECTION INTRAMUSCULAR; INTRAVENOUS at 05:31

## 2021-01-01 RX ADMIN — LEVETIRACETAM 400 MILLIGRAM(S): 250 TABLET, FILM COATED ORAL at 22:49

## 2021-01-01 RX ADMIN — Medication 4 MILLILITER(S): at 05:50

## 2021-01-01 RX ADMIN — Medication 50 MILLIGRAM(S): at 17:26

## 2021-01-01 RX ADMIN — Medication 2: at 00:12

## 2021-01-01 RX ADMIN — LEVETIRACETAM 400 MILLIGRAM(S): 250 TABLET, FILM COATED ORAL at 17:05

## 2021-01-01 RX ADMIN — Medication 4 MILLILITER(S): at 17:44

## 2021-01-01 RX ADMIN — Medication 1: at 05:48

## 2021-01-01 RX ADMIN — MORPHINE SULFATE 2 MILLIGRAM(S): 50 CAPSULE, EXTENDED RELEASE ORAL at 05:45

## 2021-01-01 RX ADMIN — Medication 100 MILLIGRAM(S): at 13:19

## 2021-01-01 RX ADMIN — Medication 2.5 MILLIGRAM(S): at 05:55

## 2021-01-01 RX ADMIN — LEVETIRACETAM 400 MILLIGRAM(S): 250 TABLET, FILM COATED ORAL at 21:16

## 2021-01-01 RX ADMIN — Medication 50 MILLIGRAM(S): at 06:06

## 2021-01-01 RX ADMIN — FOSPHENYTOIN 104 MILLIGRAM(S) PE: 50 INJECTION INTRAMUSCULAR; INTRAVENOUS at 14:27

## 2021-01-01 RX ADMIN — SODIUM CHLORIDE 500 MILLILITER(S): 9 INJECTION INTRAMUSCULAR; INTRAVENOUS; SUBCUTANEOUS at 18:31

## 2021-01-01 RX ADMIN — Medication 50 MILLIGRAM(S): at 06:24

## 2021-01-01 RX ADMIN — SODIUM CHLORIDE 10 MILLILITER(S): 9 INJECTION INTRAMUSCULAR; INTRAVENOUS; SUBCUTANEOUS at 12:06

## 2021-01-01 RX ADMIN — Medication 1: at 17:45

## 2021-01-01 RX ADMIN — Medication 300 MILLIGRAM(S): at 09:21

## 2021-01-01 RX ADMIN — MORPHINE SULFATE 1.5 MILLIGRAM(S): 50 CAPSULE, EXTENDED RELEASE ORAL at 08:43

## 2021-01-01 RX ADMIN — Medication 25 MILLIGRAM(S): at 06:20

## 2021-01-01 RX ADMIN — Medication 4 MILLILITER(S): at 05:05

## 2021-01-01 RX ADMIN — Medication 25 MILLIGRAM(S): at 04:24

## 2021-01-01 RX ADMIN — MORPHINE SULFATE 0.3 MG/HR: 50 CAPSULE, EXTENDED RELEASE ORAL at 19:26

## 2021-01-01 RX ADMIN — Medication 50 MILLIGRAM(S): at 19:21

## 2021-01-01 RX ADMIN — FOSPHENYTOIN 104 MILLIGRAM(S) PE: 50 INJECTION INTRAMUSCULAR; INTRAVENOUS at 06:12

## 2021-01-01 RX ADMIN — FOSPHENYTOIN 104 MILLIGRAM(S) PE: 50 INJECTION INTRAMUSCULAR; INTRAVENOUS at 14:19

## 2021-01-01 RX ADMIN — Medication 250 MILLIGRAM(S): at 20:37

## 2021-01-01 RX ADMIN — LEVETIRACETAM 400 MILLIGRAM(S): 250 TABLET, FILM COATED ORAL at 17:54

## 2021-01-01 RX ADMIN — MORPHINE SULFATE 1.5 MILLIGRAM(S): 50 CAPSULE, EXTENDED RELEASE ORAL at 11:36

## 2021-01-01 RX ADMIN — MORPHINE SULFATE 1.5 MILLIGRAM(S): 50 CAPSULE, EXTENDED RELEASE ORAL at 11:21

## 2021-01-01 RX ADMIN — FOSPHENYTOIN 104 MILLIGRAM(S) PE: 50 INJECTION INTRAMUSCULAR; INTRAVENOUS at 05:10

## 2021-01-01 RX ADMIN — Medication 25 MILLIGRAM(S): at 04:31

## 2021-01-01 RX ADMIN — ROBINUL 0.4 MILLIGRAM(S): 0.2 INJECTION INTRAMUSCULAR; INTRAVENOUS at 08:43

## 2021-01-01 RX ADMIN — FOSPHENYTOIN 104 MILLIGRAM(S) PE: 50 INJECTION INTRAMUSCULAR; INTRAVENOUS at 13:08

## 2021-01-01 RX ADMIN — Medication 40 MILLIEQUIVALENT(S): at 12:46

## 2021-01-01 RX ADMIN — Medication 4 MILLILITER(S): at 23:18

## 2021-01-01 RX ADMIN — Medication 2: at 18:04

## 2021-01-01 RX ADMIN — MORPHINE SULFATE 1 MILLIGRAM(S): 50 CAPSULE, EXTENDED RELEASE ORAL at 08:24

## 2021-01-01 RX ADMIN — MORPHINE SULFATE 1 MILLIGRAM(S): 50 CAPSULE, EXTENDED RELEASE ORAL at 13:06

## 2021-01-01 RX ADMIN — FOSPHENYTOIN 104 MILLIGRAM(S) PE: 50 INJECTION INTRAMUSCULAR; INTRAVENOUS at 05:41

## 2021-01-01 RX ADMIN — Medication 2: at 23:45

## 2021-01-01 RX ADMIN — MUPIROCIN 1 APPLICATION(S): 20 OINTMENT TOPICAL at 17:32

## 2021-01-01 RX ADMIN — Medication 4 MILLILITER(S): at 06:21

## 2021-01-01 RX ADMIN — LEVETIRACETAM 400 MILLIGRAM(S): 250 TABLET, FILM COATED ORAL at 10:56

## 2021-01-01 RX ADMIN — Medication 4 MILLILITER(S): at 17:49

## 2021-01-01 RX ADMIN — MORPHINE SULFATE 1 MILLIGRAM(S): 50 CAPSULE, EXTENDED RELEASE ORAL at 08:09

## 2021-01-01 RX ADMIN — Medication 1 APPLICATION(S): at 12:17

## 2021-01-01 RX ADMIN — Medication 4 MILLILITER(S): at 11:12

## 2021-01-01 RX ADMIN — SODIUM CHLORIDE 75 MILLILITER(S): 9 INJECTION, SOLUTION INTRAVENOUS at 10:44

## 2021-01-01 RX ADMIN — MORPHINE SULFATE 2 MG/HR: 50 CAPSULE, EXTENDED RELEASE ORAL at 10:08

## 2021-01-01 RX ADMIN — LEVETIRACETAM 400 MILLIGRAM(S): 250 TABLET, FILM COATED ORAL at 17:29

## 2021-01-01 RX ADMIN — FOSPHENYTOIN 104 MILLIGRAM(S) PE: 50 INJECTION INTRAMUSCULAR; INTRAVENOUS at 08:34

## 2021-01-01 RX ADMIN — MORPHINE SULFATE 1 MILLIGRAM(S): 50 CAPSULE, EXTENDED RELEASE ORAL at 05:41

## 2021-01-01 RX ADMIN — CEFEPIME 100 MILLIGRAM(S): 1 INJECTION, POWDER, FOR SOLUTION INTRAMUSCULAR; INTRAVENOUS at 05:00

## 2021-01-01 RX ADMIN — Medication 1: at 17:48

## 2021-01-01 RX ADMIN — LEVETIRACETAM 400 MILLIGRAM(S): 250 TABLET, FILM COATED ORAL at 05:30

## 2021-01-01 RX ADMIN — SODIUM CHLORIDE 60 MILLILITER(S): 9 INJECTION, SOLUTION INTRAVENOUS at 08:45

## 2021-01-01 RX ADMIN — LEVETIRACETAM 400 MILLIGRAM(S): 250 TABLET, FILM COATED ORAL at 05:40

## 2021-01-01 RX ADMIN — SODIUM CHLORIDE 50 MILLILITER(S): 9 INJECTION, SOLUTION INTRAVENOUS at 17:50

## 2021-01-01 RX ADMIN — LEVETIRACETAM 400 MILLIGRAM(S): 250 TABLET, FILM COATED ORAL at 10:53

## 2021-01-01 RX ADMIN — Medication 4 MILLILITER(S): at 13:08

## 2021-01-01 RX ADMIN — Medication 1: at 06:19

## 2021-01-01 RX ADMIN — Medication 4 MILLILITER(S): at 18:33

## 2021-01-01 RX ADMIN — FOSPHENYTOIN 104 MILLIGRAM(S) PE: 50 INJECTION INTRAMUSCULAR; INTRAVENOUS at 05:34

## 2021-01-01 RX ADMIN — FOSPHENYTOIN 104 MILLIGRAM(S) PE: 50 INJECTION INTRAMUSCULAR; INTRAVENOUS at 22:49

## 2021-01-01 RX ADMIN — LEVETIRACETAM 750 MILLIGRAM(S): 250 TABLET, FILM COATED ORAL at 04:22

## 2021-01-01 RX ADMIN — FOSPHENYTOIN 104 MILLIGRAM(S) PE: 50 INJECTION INTRAMUSCULAR; INTRAVENOUS at 21:33

## 2021-01-01 RX ADMIN — FOSPHENYTOIN 104 MILLIGRAM(S) PE: 50 INJECTION INTRAMUSCULAR; INTRAVENOUS at 05:45

## 2021-01-01 RX ADMIN — MORPHINE SULFATE 1 MILLIGRAM(S): 50 CAPSULE, EXTENDED RELEASE ORAL at 16:44

## 2021-01-01 RX ADMIN — Medication 0.5 MILLIGRAM(S): at 10:51

## 2021-01-01 RX ADMIN — Medication 650 MILLIGRAM(S): at 11:43

## 2021-01-01 RX ADMIN — Medication 100 MILLIGRAM(S): at 04:22

## 2021-01-01 RX ADMIN — MORPHINE SULFATE 2 MILLIGRAM(S): 50 CAPSULE, EXTENDED RELEASE ORAL at 07:27

## 2021-01-01 RX ADMIN — MORPHINE SULFATE 2 MILLIGRAM(S): 50 CAPSULE, EXTENDED RELEASE ORAL at 18:47

## 2021-01-01 RX ADMIN — MORPHINE SULFATE 0.3 MG/HR: 50 CAPSULE, EXTENDED RELEASE ORAL at 12:06

## 2021-01-01 RX ADMIN — LEVETIRACETAM 400 MILLIGRAM(S): 250 TABLET, FILM COATED ORAL at 17:50

## 2021-01-01 RX ADMIN — MUPIROCIN 1 APPLICATION(S): 20 OINTMENT TOPICAL at 04:31

## 2021-01-01 RX ADMIN — Medication 50 MILLIEQUIVALENT(S): at 21:57

## 2021-02-22 NOTE — H&P ADULT - PROBLEM SELECTOR PLAN 6
+Sacral decubitis ulcer  -Wound care assessment  -f/u ESR/CRP  -c/w broad spectrum abx VTE: No VTE ppx for now (pt has leg wounds and hx of recent SDH w/AMS on presentation)    NPO for now; dysphagia diet when diet resumed as per collateral

## 2021-02-22 NOTE — H&P ADULT - PROBLEM SELECTOR PLAN 5
Pt w/history of a. fib; on eliquis at outside facility, but endured traumatic SDH in 9/2020 c/b seizure  -Hold eliquis for now, consider d/c'ing indefinitely given pt is 89yo w/recent SDH c/b seizures  -c/w metoprolol for rate control

## 2021-02-22 NOTE — ED ADULT TRIAGE NOTE - CHIEF COMPLAINT QUOTE
ams since this morning, A&O x1 at this time, baseline A&O x2 per ems. sent from [facility]

## 2021-02-22 NOTE — H&P ADULT - PROBLEM SELECTOR PLAN 3
2/2 fall while on eliquis in 9/2020  -Hold eliquis   -CT  -Consider neurology vs. neurosurgery c/s PRN

## 2021-02-22 NOTE — H&P ADULT - PROBLEM SELECTOR PLAN 1
DDx metabolic vs. septic vs. neurological  -Full septic w/u including BCx, CXR, UA, UCx, VBG w/lactate  -Metabolic evaluation including CMP, CBC, VBG w/lactate  -Neurological w/u including CTH non-con; consider neuro c/s PRN  -Evaluation of the foot wound by podiatry and imaging; ESR/CRP  -s/p vanc/zosyn in ED; now transitioned to vanc/cefepime

## 2021-02-22 NOTE — H&P ADULT - ASSESSMENT
89 yo F hx htn dm, traumatic subdural in past, afib, p/w ams x 1 day BIBEMS from nursing home.  89F w/PMHx of dementia, HTN, DM2, afib c/b CVA, and recent traumatic bilateral SDH s/p fall in 9/2020 c/b seizures who is BIBEMS from nursing home for 1 day of AMS. Pt is reportedly AAOx2 and interactive at baseline, but is now lethargic, minimally conversant, and only following basic commands. Multiple possible etiologies for AMS including sepsis ISO multiple open sores, active seizure, recurring ICH, metabolic disturbance.

## 2021-02-22 NOTE — ED PROVIDER NOTE - CARE PLAN
Principal Discharge DX:	Pressure ulcer   Principal Discharge DX:	AMS (altered mental status)  Secondary Diagnosis:	Pressure ulcer  Secondary Diagnosis:	Dementia

## 2021-02-22 NOTE — H&P ADULT - HISTORY OF PRESENT ILLNESS
89 yo F hx htn dm, traumatic subdural in past, afib, p/w ams x 1 day BIBEMS from nursing home.  89F w/PMHx of HTN, DM2, afib, and fl in 9/20 traumatic bilateral SDH and p/w ams x 1 day BIBEMS from nursing home. Per staff she is usually AAOx2 but is now lethargic apeparing and intermittently following commands. Per grandson she also has known pressure ulcers on b/l feet and the L one has been getting larger in size and warm/red since last noted. Per EMS was hypotensive to 90s systolic. 89F w/PMHx of dementia, HTN, DM2, afib c/b CVA, and 9/20 traumatic bilateral SDH s/p fall c/b seizures who is BIBEMS from nursing home for 1 day of AMS. Pt is reportedly AAOx2 and interactive at baseline, but is now lethargic, minimally conversant, and only following basic commands. Collateral obtained from pt's grandson (Dr. Andre Reyes) at bedside and pt's daughter (Simran) via telephone. Pt is reported to have endured bilateral subdural hematomas following a fall out of bed in late September to early October of 2020 while on AC for her a. fib. Ms. Hernandes then began having seizures following her d/c and had to be rehospitalized for management of seizure. Pt experienced significant cognitive decline and required placement in assisted living facility. Pt currently resides at [facility] ([phone]) where another family member works at the facility and is able to check in on her.     Since her cognitive decline she has progressively lost her mobility and spends the majority of time in bed at present. Pt has since developed multiple ulcerated lesions of the foot, including a L foot lesion that is reported to be getting larger, warm, and red. Pt awoke this morning with acutely altered mental status and was send in by facility to [facility] ED for concerns of sepsis vs. neurologic dysfunction. Per EMS was hypotensive to 90s systolic.     Pt's son is a hospitalist here at [facility] (Dr. Andre Reyes) 89F w/PMHx of dementia, HTN, DM2, afib c/b CVA, and traumatic bilateral SDH s/p fall in 9/2020 c/b seizures who is BIBEMS from nursing home for 1 day of AMS. Pt is reportedly AAOx2 and interactive at baseline, but is now lethargic, minimally conversant, and only following basic commands. Collateral obtained from pt's grandson (Dr. Andre Reyes) at bedside and pt's daughter (Simran) via telephone. Pt is reported to have endured bilateral subdural hematomas following a fall out of bed in late September to early October of 2020 while on AC for her a. fib. Ms. Hernandes then began having seizures following her d/c and had to be rehospitalized for management of seizure. Pt experienced significant cognitive decline and required placement in assisted living facility. Pt currently resides at [facility] ([phone]) where another family member works at the facility and is able to check in on her.     Since her cognitive decline she has progressively lost her mobility and spends the majority of time in bed at present. Pt has since developed multiple ulcerated lesions of the foot, including a L foot lesion that is reported to be getting larger, warm, and red. Pt awoke this morning with acutely altered mental status and was send in by facility to [facility] ED for concerns of sepsis vs. neurologic dysfunction. Per EMS was hypotensive to 90s systolic.     Pt's son is a hospitalist here at [facility] (Dr. Andre Reyes)    Pt was given vanc/zosyn in ED as part of empiric abx coverage for presumed sepsis; Collateral subsequently indicated pt is allergic to penicillins (rash). Pt has been ordered for benadryl to avoid any reactions.

## 2021-02-22 NOTE — H&P ADULT - NSHPLABSRESULTS_GEN_ALL_CORE
T(C): 36.1 (02-22-21 @ 17:44), Max: 36.2 (02-22-21 @ 16:10)  HR: 71 (02-22-21 @ 17:44) (71 - 87)  BP: 121/68 (02-22-21 @ 17:44) (121/68 - 138/87)  RR: 19 (02-22-21 @ 17:44) (17 - 19)  SpO2: 96% (02-22-21 @ 17:44) (95% - 96%)                          13.0   5.56  )-----------( 209      ( 22 Feb 2021 18:40 )             44.3           CAPILLARY BLOOD GLUCOSE        PT/INR - ( 22 Feb 2021 18:40 )   PT: 14.0 sec;   INR: 1.18 ratio         PTT - ( 22 Feb 2021 18:40 )  PTT:27.4 sec      diphenhydrAMINE   Injectable 25 milliGRAM(s) IV Push once PRN

## 2021-02-22 NOTE — H&P ADULT - NSHPPHYSICALEXAM_GEN_ALL_CORE
Vital Signs (24 Hrs):  T(C): 36.1 (02-22-21 @ 17:44), Max: 36.2 (02-22-21 @ 16:10)  HR: 71 (02-22-21 @ 17:44) (71 - 87)  BP: 121/68 (02-22-21 @ 17:44) (121/68 - 138/87)  RR: 19 (02-22-21 @ 17:44) (17 - 19)  SpO2: 96% (02-22-21 @ 17:44) (95% - 96%)  Wt(kg): --  PHYSICAL EXAM:  GENERAL: NAD, lying in bed comfortably  HEAD:  Atraumatic, Normocephalic  EYES: EOMI, PERRLA, conjunctiva and sclera clear  ENT: Moist mucous membranes  NECK: Supple, No JVD  CHEST/LUNG: Clear to auscultation bilaterally; No rales, rhonchi, wheezing, or rubs. Unlabored respirations  HEART: Regular rate and rhythm; No murmurs, rubs, or gallops  ABDOMEN: Bowel sounds present; Soft, Nontender, Nondistended   EXTREMITIES:  2+ Peripheral Pulses, brisk capillary refill. No clubbing, cyanosis, or edema  NERVOUS SYSTEM:  Alert & Oriented X0,   MSK: FROM all 4 extremities, full and equal strength  SKIN: No rashes or lesions Vital Signs (24 Hrs):  T(C): 36.1 (02-22-21 @ 17:44), Max: 36.2 (02-22-21 @ 16:10)  HR: 71 (02-22-21 @ 17:44) (71 - 87)  BP: 121/68 (02-22-21 @ 17:44) (121/68 - 138/87)  RR: 19 (02-22-21 @ 17:44) (17 - 19)  SpO2: 96% (02-22-21 @ 17:44) (95% - 96%)  Wt(kg): --    PHYSICAL EXAM:  Gen: Fatigued, but spontaneously opening eyes and following some basic commands; significant cachexia  Head: NCAT  HEENT: PERRL, Oral mucosa is moderately dry, normal conjunctiva, anicteric, neck supple  Lung: +mild crackles in the RLL; otherwise CTAB  CV: Normal rate w/irregularly irregular rhythm; No MRG	  Abd: soft, NTND, no rebound or guarding, +BSx4  MSK: No edema, no visible deformities  Neuro: Moving all extremity grossly, but poor movement of L arm and difficulty following commands w/L hand (reported as chronic s/p CVA). AAOx0. Spontaneously opening eyes and intermittently moaning to verbal stimulation. Non-conversant  Skin: 2x2cm stage 2 ulcer of the medial aspect of the R great toe w/surrounding erythema and warmth; possible exposed bone; 2mm stage 2 chronic ulcer to base of L 5th toe Vital Signs (24 Hrs):  T(C): 36.1 (02-22-21 @ 17:44), Max: 36.2 (02-22-21 @ 16:10)  HR: 71 (02-22-21 @ 17:44) (71 - 87)  BP: 121/68 (02-22-21 @ 17:44) (121/68 - 138/87)  RR: 19 (02-22-21 @ 17:44) (17 - 19)  SpO2: 96% (02-22-21 @ 17:44) (95% - 96%)  Wt(kg): --    PHYSICAL EXAM:  Gen: Fatigued, but spontaneously opening eyes and following some basic commands; significant cachexia  Head: NCAT  HEENT: PERRL, Oral mucosa is moderately dry, normal conjunctiva, anicteric, neck supple  Lung: +mild crackles in the RLL; otherwise CTAB  CV: Normal rate w/irregularly irregular rhythm; No MRG	  Abd: soft, NTND, no rebound or guarding, +BSx4  MSK: No edema, no visible deformities  Neuro: Moving all extremity grossly, but poor movement of L arm and difficulty following commands w/L hand (reported as chronic s/p CVA). AAOx0. Spontaneously opening eyes and intermittently moaning to verbal stimulation. Non-conversant  Skin: 2x2cm stage 2 ulcer of the medial aspect of the L great toe w/surrounding erythema and warmth; possible exposed bone; 2mm stage 2 chronic ulcer to base of R 5th toe

## 2021-02-22 NOTE — ED ADULT NURSE NOTE - OBJECTIVE STATEMENT
Patient  is  lethargic  but  responds  to  painful  stimuli.  She  is  afebrile.  Color  is  good  and  skin  warm to touch.

## 2021-02-22 NOTE — H&P ADULT - NSHPLANGTRANSLATORFT_GEN_A_CORE
Pt's grandson (Dr. Andre Reyes) at bedside; mental status of pt prohibitive of directly interviewing pt. Pt noted to be AAOx2 at baseline, but can only follow basic commands at present

## 2021-02-22 NOTE — ED PROVIDER NOTE - OBJECTIVE STATEMENT
89 yo F hx htn dm, traumatic subdural in past, afib, p/w ams x 1 day BIBEMS from nursing home. Per staff she is usually AAOx2 but is now lethargic apeparing and intermittently following commands. Per grandson she also has known pressure ulcers on b/l feet and the L one has been getting larger in size and warm/red since last noted. Per EMS was hypotensive to 90s systolic. 89 yo F hx htn dm, traumatic subdural in past, afib, p/w ams x 1 day BIBEMS from nursing home. Per staff she is usually AAOx2 but is now lethargic appearing and intermittently following commands. Per grandson she also has known pressure ulcers on b/l feet and the L one over MTP has been getting larger in size and warm/red since last noted. Per EMS was hypotensive to 90s systolic. Patient reported to be AxO1 to 0 per NH staff as per ems.

## 2021-02-22 NOTE — ED PROVIDER NOTE - ATTENDING CONTRIBUTION TO CARE
Ajay Page MD, FACEP: In this physician's medical judgement based on clinical history and physical exam, patient with new AMS, increased confusion axox1 from axox2 and now with bilateral foot ulceration and left foot appears to be erythematous and possibly infected  will get iv, cbc, cmp, type and screen, xray of foot, antibiotics, fluids prn, ct head secondary to ams, and likely admit.  Will follow up on labs, analgesia, imaging, reassess and disposition to the inpatient team as clinically indicated. Ajay Page MD, FACEP: In this physician's medical judgement based on clinical history and physical exam, patient with new AMS, increased confusion axox1 from axox2 and now with bilateral foot ulceration and left foot appears to be erythematous and possibly infected  will get iv, cbc, cmp, type and screen, xray of foot, antibiotics, fluids prn, ct head secondary to ams, and likely admit.  Will follow up on labs, analgesia, imaging, reassess and disposition to the inpatient team as clinically indicated.  Patient endorsed to the medical team at the time of admission. Based on patient's history and physical exam, as well as the results of today's workup, I feel that patient warrants admission to the hospital for further workup/evaluation and continued management. I discussed the findings of today's workup with the patient and addressed the patient's questions and concerns. The patient was agreeable with admission. Our team spoke with the inpatient receiving team who accepted the patient for admission and subsequently took over the patient's care at the time of admission. The receiving team will follow up on pending labs, analgesia, any clinical imaging results, ancillary findings, reassess, and disposition as clinically indicated. Details of patient and plan conveyed to receiving physician team and conveyed back for understanding. There were no questions at this time about the patient's status, disposition, and plan. Patient's care to be taken over by receiving physician team at this time, all decisions regarding the progression of care will be made at their discretion.

## 2021-02-22 NOTE — ED ADULT TRIAGE NOTE - HEIGHT IN CM
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 Upon examination this was at least a stage IIB cervical cancer.     Biopsy did reveal:  Pathologic Diagnosis :   Cervix, biopsy:   -Squamous cell carcinoma, moderately differentiated, nonkeratinizing.     She then chose to proceed with treatment consisting of radiation therapy and weekly cisplatin. This was completed 8/2014.    Following completion of chemoradiation the patient was offered additional chemotherapy, carboplatin and Taxol, as per the outback trial, however the patient declined any further treatment.    Post-treatment PET/CT was obtained on 1/21/15 which showed:  IMPRESSION:  Hypermetabolic left level II cervical lymph nodes and increased FDG  activity about the cervix as detailed above. Findings are concerning for  disease recurrence with metastatic cervical lymph nodes.    This was followed by a CT A/P on 1/23/15:  IMPRESSION:  1. Concentric short segment wall thickening of the sigmoid colon could be  from colitis. Statistically unlikely neoplasm cannot be excluded.  Correlation is recommended with recent colonoscopy results, if any.  2. Minimal abnormal fluid in the abdomen and pelvis.  3. Left side the uterus demonstrates an abnormal 3.0 x 2.8 x 2.1 cm  moderate attenuation soft tissue or fluid collection. Exact etiology is  not clear. This may represent a liquefactive fibroid. The appearance is  not typical for tubo-ovarian abscess. Pelvic ultrasound is recommended for  further evaluation.    She was referred to Dr. Mejía for excisional biopsy of the cervical lymph nodes.  This was  performed on March 26, 2015. Final pathology revealed:  Left Neck Lymph Node, Excisional Biopsy:   - Negative for primary or metastatic malignancy.   - Follicular hyperplasia.     She had a repeat CT scan on  6/16/2015. This revealed:   IMPRESSION:  Interval improvement in the pelvis. No residual free fluid is identified.  There is persistent fat stranding and soft tissue fullness again seen  surrounding the rectum and distal sigmoid colon.  New mild bilateral hydronephrosis. This may be due to fibrosis in the  pelvic region. Clinical correlation is recommended    Plan then was for a PET scan in 6 months.  Therefore on 11/3/15, a PET/CT was performed.  This revealed:   IMPRESSION: New moderately hypermetabolic left level IIa activity may  relate to asymmetric superior submandibular gland activity or vascular  activity or a hyperplastic unresolved lymph node. Recommend followup. No  other abnormal hypermetabolic activity/lesions.    Patient did follow up with ENT, Dr. Mejía on 11/23/15.  Per his note, \"The patient has a new hypermetabolic area of the left neck level IIA.  On my own review of scan images, I don't see a distinct mass lesion in the area.  Bimanual palpation does not reveal any tenderness or palpable mass.  Previous open neck lymph node biopsy did not reveal any malignancy, and it would be highly unusual for a gynecological cervical squamous cell carcinoma to metastasize to the left neck without obvious manifestations elsewhere in the body.\"     She had a follow-up PET scan on 11/2016 which was negative.     Pap smear was from 6/30/17  which revealed:     ASCUS HPV positive    She was advised to have colposcopy and she did not return for this.      Most recent pap is from 3/22/19 and revealed:     Epithelial cell abnormalities:  SQUAMOUS     ATYPICAL SQUAMOUS CELLS OF UNDETERMINED SIGNIFICANCE (ASC-US).     Aptima High Risk HPV Assay Result:  NEGATIVE       She presents today for follow-up.     She denies  vaginal bleeding, problems or changes with bowel or bladder function, fever, chills, chest pain, shortness of breath, nausea, vomiting, lower extremity edema or pain. Denies increase in abdominal girth, abdominal bloating, or early satiety.   She does report vaginal discharge.       Colonoscopy: 12/19/2014, Dr. 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     Priority: Low   • Preop testing 03/20/2015     Priority: Low   • Enlargement of lymph nodes 01/22/2015     Priority: Low   • Colon cancer screening 11/06/2014     Priority: Low   • Postmenopausal bleeding 05/08/2014     Priority: Low   • HSIL (high grade squamous intraepithelial lesion) on Pap smear of cervix 05/08/2014     Priority: Low     TVUS notes cervical mass        PAST MEDICAL, FAMILY AND SOCIAL HISTORY     Medications:  Current Outpatient Prescriptions   Medication   • meloxicam (MOBIC) 15 MG tablet   • docusate sodium-sennosides (SENNA S) 50-8.6 MG per tablet   • ALBUTEROL IN   • Fluticasone-Salmeterol (ADVAIR DISKUS IN)   • Magnesium 200 MG TABS   • Biotin 1000 MCG TABS   • aspirin 81 MG chewable tablet   • Multiple Vitamins-Minerals (MULTIVITAMIN GUMMIES ADULT PO)   • Cyanocobalamin (VITAMIN B 12 PO)   • Cholecalciferol (VITAMIN D) 1000 UNITS capsule   • Ferrous Sulfate (IRON) 325 (65 FE) MG TABS     Allergies:  ALLERGIES:   Allergen Reactions   • Sulfa Antibiotics SHORTNESS OF BREATH   • Vicodin [Hydrocodone-Acetaminophen] GI UPSET   • Latex   (Environmental) RASH     Past Medical  History/Surgeries:  Past Medical History:   Diagnosis Date   • Abnormal Pap smear    • Anesthesia complication      states \"jaw has locked\" in the past with anesthesia - she thinks it stems from a past experience at the dentist   • Arthritis    • Bowel trouble    • Gastroesophageal reflux disease     occasional   • Heart murmur    • Malignant neoplasm (CMS/HCC) 4/2014    cervical cancer   • RAD (reactive airway disease)    • Sexual dysfunction    • Stomach disorder    • Wears glasses      Past Surgical History:   Procedure Laterality Date   • Colposcopy,loop electrd cervix excis  1990   • Hand surgery  2009    trigger finger - right    • Ir implantable port vein access  6/2014   • Neck mass excision Left 3/26/2015    Left neck deep jugular lymph nodes   • Tandem and ovoid insertion  6/19/14, 6/26/14, 7/3/14, 7/10/14, 7/17/14   • Vaginal delivery      x1     Family History:  Family History   Problem Relation Age of Onset   • Coronary Artery Disease Father    • Hypertension Father    • Cancer Father         lung, smoker   • Coronary Artery Disease Mother    • Cancer Mother 70        cervical cancer   • Asthma Daughter    • Coronary Artery Disease Maternal Grandmother    • Hypertension Maternal Grandmother    • Coronary Artery Disease Paternal Grandmother    • High cholesterol Paternal Grandfather    • Hypertension Paternal Grandfather    • Cancer Maternal Aunt         Breast cancer age 60     Social History:  Social History     Tobacco Use   • Smoking status: Former Smoker     Packs/day: 0.50     Years: 5.00     Pack years: 2.50     Types: Cigarettes   • Smokeless tobacco: Never Used   Substance Use Topics   • Alcohol use: Yes     Alcohol/week: 0.0 standard drinks     Comment: rare wine     REVIEW OF SYSTEMS   A 12 point review of systems is performed and reviewed with pertinent findings as noted above.    Patient's ECOG Perfomance Status is 0.   PHYSICAL EXAM     PHYSICAL EXAM:  Vital Signs:  Visit Vitals  LMP 03/28/2014     GEN: Normal-appearing female in no acute distress  Lungs: Clear to auscultation bilaterally  Heart: Regular  rate and rhythm  EXTREMITIES: no erythema, no edema.  NEUROLOGICAL: Alert and oriented ×3. No focal deficits  SKIN: warm, moist, without erythema, rash, or lesions  MENTAL STATUS: A&O X 3, normal thought, mood and affect, no halucinations noted.      ASSESSMENT/PLAN     This is a 61 year old female with clinical stage IIB squamous cell carcinoma of the cervix. She has completed concurrent chemoradiation in 8/2014. She declined any further chemotherapy following the completion of chemoradiation.      Due to concerns on PET scan, she has now undergone removal of the cervical lymph nodes and these were benign. She has been evaluated by Dr. Mejía who is not concerned for recurrence. She continues to follow with him.    On today's exam there is no evidence of disease.     She will continue with surveillance visits. She will return to the clinic in 6 months, following that visit, she may be seen annually.     She was encouraged to call the office with any further questions, concerns or issues that may arise.      Dr. Landrum, thank you so much for allowing me the opportunity to assist you in the care of this patient.  Please contact me if you have any questions or concerns.  I look forward to being of further service to you and will continue to keep you informed of any and all subsequent developments in care.    Shayy Potter NP   Patient seen and evaluated along with the advanced practice clinician (APC).  I have participated in the care of the patient.  I agree with the above assessment and plan.      Ms. Rocha presents today for surveillance.  No concerns; quite anxious about the pelvic exam and pap smear today.      Physical exam:    Visit Vitals  LMP 03/28/2014       General Appearance:     alert, appears stated age, cooperative and no distress  Head:   Normocephalic, without obvious abnormality, atraumatic  Abdomen:   soft, non-tender; bowel sounds normal; no palpable masses,  no organomegaly  Extremities:    extremities normal, atraumatic, no cyanosis or edema  Pulses:   2+ and symmetric  Neurologic:   Alert and oriented x3  GYN: External genitalia show no lesions.  Urethral meatus shows no polyps.  Urethra and bladder are well supported with no cystocele.  The vagina is extremely shortened.  No masses noted; top of vagina smooth.  The anus and perineum show no lesions.      Pap smear performed.      All questions answered.  Support given. Will call with pap smear results.      Nancy Toribio MD.  [facility]     [address] [place] [address]  Phone: [phone]  Pager:  [phone]               160.02

## 2021-02-22 NOTE — ED PROVIDER NOTE - PMH
Atrial fibrillation    Cerebrovascular accident (CVA)    Dementia    Diabetes    HTN (hypertension)    SDH (subdural hematoma)    Seizure

## 2021-02-22 NOTE — H&P ADULT - PROBLEM SELECTOR PLAN 2
2x2cm stage 2 ulcer of the medial aspect of the R great toe w/surrounding erythema and warmth; possible exposed bone  -Podiatry c/s placed; f/u recs  -Wound care c/s placed  -s/p vanc/zosyn in ED; now transitioned to vanc/cefepime  -f/u ESR/CRP and imaging studies 2x2cm stage 2 ulcer of the medial aspect of the L great toe w/surrounding erythema and warmth; possible exposed bone  -Podiatry c/s placed; f/u recs  -Wound care c/s placed  -s/p vanc/zosyn in ED; now transitioned to vanc/cefepime  -f/u ESR/CRP and imaging studies

## 2021-02-22 NOTE — ED ADULT NURSE REASSESSMENT NOTE - NS ED NURSE REASSESS COMMENT FT1
1900: Report received from meek BALDWIN in purple.   1945: pt. resting in purple lopez 2, arousable to tactile stimuli. AOx0. As per MD Lu, this was patient's baseline throughout the day, pt. ok to go to CT. No rash noted. As per NICOLASA Mahan, patient had rash after receiving zosyn, MD Schmidt and MD Lu aware. No rash noted to patient's face, abdomen, chest, or arms. PRN benadryl ordered if needed but not needed at this time. VSS. Will continue to reassess.

## 2021-02-22 NOTE — H&P ADULT - ATTENDING COMMENTS
Pt seen and examined at bedside.  I have precepted this case with house staff and agree with resident note above and have edited it where appropriate.  This patient was assigned to me by the hospitalist in charge; my involvment in this case has consisted of the initial history, physical, chart review, and management plan.  This patient was previously unknown to me.   Briefly: 89F w/PMHx of dementia, HTN, DM2, afib c/b CVA, and recent traumatic bilateral SDH s/p fall in 9/2020 c/b seizures who is BIBEMS from nursing home for 1 day of AMS. Reportedly hypotensive per EMS but VSS throughout thus far in ER. Pt seen/examined at bedside, agree with resident note above. Exam compared to arrival has deteriorated, as pt's mentation has decreased and is only moaning to sternal rub now, not answering questions as before.  However, she does not appear in distress; vitals remain stable.  CTH was urgently obtained in ER; results discussed with radiology team, SDH again noted, with chronic appearance and no obv acute component, and no evidence of MLS. Labs reviewed: notable for mild hypernatremia 152 and mild resp acidosis. No obv source of infection other than exposed pressure ulcer of R toe. Awaiting pods eval, will c/w empiric abx.  Will get neuro eval. Possible MRI of foot/head pending consultations. Close monitoring overnight. Case d/w grandson (Dr. Andre Reyes) overnight. Pt seen and examined at bedside.  I have precepted this case with house staff and agree with resident note above and have edited it where appropriate.  This patient was assigned to me by the hospitalist in charge; my involvment in this case has consisted of the initial history, physical, chart review, and management plan.  This patient was previously unknown to me.   Briefly: 89F w/PMHx of dementia, HTN, DM2, afib c/b CVA, and recent traumatic bilateral SDH s/p fall in 9/2020 c/b seizures who is BIBEMS from nursing home for 1 day of AMS. Reportedly hypotensive per EMS but VSS throughout thus far in ER. Pt seen/examined at bedside, agree with resident note above. Exam compared to arrival has deteriorated, as pt's mentation has decreased and is only moaning to sternal rub now, not answering questions as before.  However, she does not appear in distress; vitals remain stable.  CTH was urgently obtained in ER; results discussed with radiology team, SDH again noted, with chronic appearance and no obv acute component, and no evidence of MLS. Labs reviewed: notable for mild hypernatremia 152 and mild resp acidosis. No obv source of infection other than exposed pressure ulcer of R toe. Awaiting pods eval, will c/w empiric abx.  Will get neuro eval, consulted overnight, verbally rec to pursue rEEG/MRI. Possible MRI of foot/head pending consultations. Close monitoring overnight. Case d/w grandson (Dr. Andre Reyes) overnight. Pt seen and examined at bedside.  I have precepted this case with house staff and agree with resident note above and have edited it where appropriate.  This patient was assigned to me by the hospitalist in charge; my involvment in this case has consisted of the initial history, physical, chart review, and management plan.  This patient was previously unknown to me.   Briefly: 89F w/PMHx of dementia, HTN, DM2, afib c/b CVA, and recent traumatic bilateral SDH s/p fall in 9/2020 c/b seizures who is BIBEMS from nursing home for 1 day of AMS. Reportedly hypotensive per EMS but VSS throughout thus far in ER. Pt seen/examined at bedside, agree with resident note above. Exam compared to arrival has deteriorated, as pt's mentation has decreased and is only moaning to sternal rub now, not answering questions as before.  However, she does not appear in distress; vitals remain stable.  CTH was urgently obtained in ER; results discussed with radiology team, SDH again noted, with chronic appearance and no obv acute component, and no evidence of MLS. Labs reviewed: notable for mild hypernatremia 152 and mild resp acidosis. No obv source of infection other than exposed pressure ulcer of R toe. Awaiting pods eval, will c/w empiric abx.  Will get neuro eval, consulted overnight, verbally rec to pursue rEEG/MRI. Possible MRI of foot pending pods consultation. Close monitoring overnight. Case d/w grandson (Dr. Andre Reyes) overnight.

## 2021-02-22 NOTE — H&P ADULT - NSICDXPASTMEDICALHX_GEN_ALL_CORE_FT
PAST MEDICAL HISTORY:  Atrial fibrillation     Cerebrovascular accident (CVA)     Dementia     Diabetes     HTN (hypertension)     SDH (subdural hematoma)     Seizure

## 2021-02-22 NOTE — H&P ADULT - PROBLEM SELECTOR PLAN 7
VTE: No VTE ppx for now (pt has leg wounds and hx of recent SDH w/AMS on presentation)    NPO for now; dysphagia diet when diet resumed as per collateral

## 2021-02-23 NOTE — PROGRESS NOTE ADULT - PROBLEM SELECTOR PLAN 2
2x2cm stage 2 ulcer of the medial aspect of the L great toe w/surrounding erythema and warmth; possible exposed bone  -Podiatry c/s placed; f/u recs  -Wound care c/s placed  -s/p vanc/zosyn in ED; now transitioned to vanc/cefepime  -f/u ESR/CRP and imaging studies 2x2cm stage 2 ulcer of the medial aspect of the L great toe w/surrounding erythema and warmth; possible exposed bone  -Podiatry c/s placed; recs appreciated  -Wound care c/s placed  -s/p vanc/zosyn in ED; now transitioned to vanc/cefepime  ESR/CRP elevated; XR c/f osteo; MRI ordered

## 2021-02-23 NOTE — PHYSICAL THERAPY INITIAL EVALUATION ADULT - PREDICTED DURATION OF THERAPY (DAYS/WKS), PT EVAL
PT to sign off at this time, pt at baseline dependent of all adls and functional mobility 2 weeks for all PT goals below

## 2021-02-23 NOTE — PROGRESS NOTE ADULT - ASSESSMENT
89F w/PMHx of dementia, HTN, DM2, afib c/b CVA, and recent traumatic bilateral SDH s/p fall in 9/2020 c/b seizures who is BIBEMS from nursing home for 1 day of AMS. Pt is reportedly AAOx2 and interactive at baseline, but is now lethargic, minimally conversant, and only following basic commands. Multiple possible etiologies for AMS including sepsis ISO multiple open sores, active seizure, recurring ICH, metabolic disturbance.

## 2021-02-23 NOTE — PHYSICAL THERAPY INITIAL EVALUATION ADULT - PASSIVE RANGE OF MOTION EXAMINATION, REHAB EVAL
b/l LE's in knee flex contract ~90 degrees./bilateral upper extremity Passive ROM was WFL (within functional limits)

## 2021-02-23 NOTE — PHYSICAL THERAPY INITIAL EVALUATION ADULT - PRECAUTIONS/LIMITATIONS, REHAB EVAL
CONT: Per H&P: collateral obtained from pt's grandson (Dr. Andre Reyes) at bedside and pt's daughter (Simran) via telephone. Pt is reported to have endured b/l subdural hematomas following a fall out of bed in late September to early October of 2020 while on AC for her a. fib. Ms. Hernandes then began having seizures following her d/c and had to be rehospitalized for management of seizure. Pt experienced significant cognitive decline and required placement in assisted living facility. Pt currently resides at [facility] ([phone]) where another family member works at the facility and is able to check in on her. Since her cognitive decline she has progressively lost her mobility and spends the majority of time in bed at present. Pt has since developed multiple ulcerated lesions of the foot, including a L foot lesion that is reported to be getting larger, warm, and red. Pt awoke this morning with acutely altered mental status and was send in by facility to [facility] ED for concerns of sepsis vs. neurologic dysfunction. Per EMS was hypotensive to 90s systolic./fall precautions/seizure precautions

## 2021-02-23 NOTE — PHYSICAL THERAPY INITIAL EVALUATION ADULT - ADDITIONAL COMMENTS
Per H&P pt currently resides at [facility] ([phone]), pt with recent cognitive decline and has progressively lost her mobility and spends the majority of time in bed at present. Per H&P pt currently resides at [facility] ([phone]), pt with recent cognitive decline and has progressively lost her mobility and spends the majority of time in bed at present. Pt was pivoting from bed to chair via pivot transfer.

## 2021-02-23 NOTE — CONSULT NOTE ADULT - ASSESSMENT
Assessment: 89yo woman h/o dementia, HTN, DMII, atrial fibrillation, traumatic b/l SDH on eliquis 9/2020 and seizures admitted for AMS from nursing home with Neurology consult for such. Neurological examination demonstrates no verbal output and otherwise no gross focal deficit. CTH noncon demonstrates R holohemispheric SDH chronic appearing.     Impression: AMS of unknown etiology     Recommendations:  [ ] MRI brain w/w/o contrast  [ ] UA, CXR  [ ] monitor wbc and fever curves  [ ] if spikes fevers and despite treatment does not demonstrate improvement, may warrant LP  [ ] routine EEG  [ ] vit B12, folate, methylmalonic acid, homocysteine, TSH, RASHID    -Management & disposition to be d/w Neurology Attending Dr. Fonseca

## 2021-02-23 NOTE — PROGRESS NOTE ADULT - PROBLEM SELECTOR PLAN 4
Hx of seizure following SDH  -continue home seizure meds Hx of seizure following SDH  -continue home seizure meds  -transitioned to phospheyntoin given ams and NPO

## 2021-02-23 NOTE — CONSULT NOTE ADULT - ASSESSMENT
Pt is 89yo who presents w/ bilateral foot wounds  -Pt was seen and examined  -afebrile, no leukocytosis   -Right foot dorsal fifth digit wound at the PIPJ to bone w/ fibrogranular wound bed, no periwound erythema, no malodor, no local signs of infection.  -Left foot medial first MPJ wound to bone w/ fibrotic wound base, w/ periwound erythema 1cm beyond the wound edges. There is no active drainage, or purulence noted.   -RFXR: demonstrating  Mild cortical irregularity of the adjacent medial aspect of the first metatarsal head may be degenerative/enthesopathic change or secondary to osteomyelitis. Correlate with pending MRI.  - Pod plan for local wound care  -Discussed w/ attending   Pt is 89yo who presents w/ bilateral foot wounds  -Pt was seen and examined  -afebrile, no leukocytosis   -Right foot dorsal fifth digit wound at the PIPJ to bone w/ fibrogranular wound bed, no periwound erythema, no malodor, no local signs of infection.  -Left foot medial first MPJ wound to bone w/ fibrotic wound base, w/ periwound erythema 1cm beyond the wound edges. There is no active drainage, or purulence noted.   -RFXR: demonstrating  Mild cortical irregularity of the adjacent medial aspect of the first metatarsal head may be degenerative/enthesopathic change or secondary to osteomyelitis.  - ordered MRI.  - Pod plan for local wound care  -Discussed w/ attending

## 2021-02-23 NOTE — CONSULT NOTE ADULT - SUBJECTIVE AND OBJECTIVE BOX
Podiatry pager #: [phone]/ [phone]    Patient is a 89y old  Female who presents with a chief complaint of bilateral foot wounds (23 Feb 2021 04:50)      HPI:  89F w/PMHx of dementia, HTN, DM2, afib c/b CVA, and traumatic bilateral SDH s/p fall in 9/2020 c/b seizures who is BIBEMS from nursing home for 1 day of AMS. Pt is reportedly AAOx2 and interactive at baseline, but is now lethargic, minimally conversant, and only following basic commands. Collateral obtained from pt's grandson (Dr. Andre Reyes) at bedside and pt's daughter (Simran) via telephone. Pt is reported to have endured bilateral subdural hematomas following a fall out of bed in late September to early October of 2020 while on AC for her a. fib. Ms. Hernandes then began having seizures following her d/c and had to be rehospitalized for management of seizure. Pt experienced significant cognitive decline and required placement in assisted living facility. Pt currently resides at [facility] ([phone]) where another family member works at the facility and is able to check in on her.     Since her cognitive decline she has progressively lost her mobility and spends the majority of time in bed at present. Pt has since developed multiple ulcerated lesions of the foot, including a L foot lesion that is reported to be getting larger, warm, and red. Pt awoke this morning with acutely altered mental status and was send in by facility to [facility] ED for concerns of sepsis vs. neurologic dysfunction. Per EMS was hypotensive to 90s systolic.     Pt's son is a hospitalist here at [facility] (Dr. Andre Reyes)    Pt was given vanc/zosyn in ED as part of empiric abx coverage for presumed sepsis; Collateral subsequently indicated pt is allergic to penicillins (rash). Pt has been ordered for benadryl to avoid any reactions. (22 Feb 2021 17:47)      PAST MEDICAL & SURGICAL HISTORY:  Seizure    Cerebrovascular accident (CVA)    HTN (hypertension)    Diabetes    Dementia    Atrial fibrillation    SDH (subdural hematoma)    No significant past surgical history        MEDICATIONS  (STANDING):  cefepime   IVPB 1000 milliGRAM(s) IV Intermittent every 12 hours  collagenase Ointment 1 Application(s) Topical daily  fosphenytoin IVPB 100 milliGRAM(s) PE IV Intermittent three times a day  levETIRAcetam  IVPB 500 milliGRAM(s) IV Intermittent every 12 hours  metoprolol tartrate Injectable 5 milliGRAM(s) IV Push every 6 hours  mupirocin 2% Ointment 1 Application(s) Topical every 12 hours  senna 2 Tablet(s) Oral at bedtime  vancomycin  IVPB 1000 milliGRAM(s) IV Intermittent every 24 hours    MEDICATIONS  (PRN):  diphenhydrAMINE   Injectable 25 milliGRAM(s) IV Push once PRN Rash      Allergies    penicillin (Rash)    Intolerances        VITALS:    Vital Signs Last 24 Hrs  T(C): 36.3 (23 Feb 2021 08:07), Max: 36.3 (23 Feb 2021 05:38)  T(F): 97.3 (23 Feb 2021 08:07), Max: 97.4 (23 Feb 2021 05:38)  HR: 58 (23 Feb 2021 08:07) (58 - 87)  BP: 141/82 (23 Feb 2021 08:07) (113/73 - 151/77)  BP(mean): --  RR: 20 (23 Feb 2021 08:07) (16 - 20)  SpO2: 100% (23 Feb 2021 08:07) (95% - 100%)    LABS:                          12.3   5.61  )-----------( 163      ( 23 Feb 2021 06:58 )             40.8       02-23    141  |  108  |  16  ----------------------------<  189<H>  4.1   |  24  |  0.44<L>    Ca    8.8      23 Feb 2021 06:58  Phos  2.7     02-23  Mg     2.3     02-23    TPro  6.4  /  Alb  3.0<L>  /  TBili  0.3  /  DBili  x   /  AST  24  /  ALT  21  /  AlkPhos  171<H>  02-23      CAPILLARY BLOOD GLUCOSE          PT/INR - ( 22 Feb 2021 18:40 )   PT: 14.0 sec;   INR: 1.18 ratio         PTT - ( 22 Feb 2021 18:40 )  PTT:27.4 sec    LOWER EXTREMITY PHYSICAL EXAM:    Vascular: DP 1/4 bilateral, PT not palpable, B/L, Temperature gradient WNL, B/L.   Neuro: Epicritic sensation reduced to the level of digits, B/L.  Musculoskeletal/Ortho: prone to contracture of LE, unremarkable otherwise    Right foot dorsal fifth digit wound at the PIPJ to bone w/ fibrogranular wound bed, no periwound erythema, no malodor, no local signs of infection.    Left foot medial first MPJ wound to bone w/ fibrotic wound base, w/ periwound erythema 1cm beyond the wound edges. There is no active drainage, or purulence noted.         RADIOLOGY & ADDITIONAL STUDIES:

## 2021-02-23 NOTE — PHYSICAL THERAPY INITIAL EVALUATION ADULT - GENERAL OBSERVATIONS, REHAB EVAL
Pt received supine in bed, +IVL, +RN Farzaneh and WC NEGIN Watkins present, pt alert however mostly non-verbal, yaya physical therapy functional and wound care eval.

## 2021-02-23 NOTE — PROGRESS NOTE ADULT - SUBJECTIVE AND OBJECTIVE BOX
PROGRESS NOTE:     CONTACT INFO:  Alexander Taylor MD (Resident Physician - PGY-1 - Internal Medicine)  [email or web address]  Pager: [phone] (any site) or [phone] ([facility] only)    Patient is a 89y old  Female who presents with a chief complaint of AMS (23 Feb 2021 12:57)      SUBJECTIVE / OVERNIGHT EVENTS:  No acute events overnight. Patient seen and evaluated at bedside. Mild improvement in clinical and neuro status overnight, but still fairly altered in the AM encounter. No fever/chills as per charting. Moaning in pain and saying yes when asked if she is having pain. Otherwise ROS unable to be performed due to mental status    ADDITIONAL REVIEW OF SYSTEMS:    MEDICATIONS  (STANDING):  cefepime   IVPB 1000 milliGRAM(s) IV Intermittent every 12 hours  collagenase Ointment 1 Application(s) Topical daily  fosphenytoin IVPB 100 milliGRAM(s) PE IV Intermittent three times a day  levETIRAcetam  IVPB 500 milliGRAM(s) IV Intermittent every 12 hours  metoprolol tartrate Injectable 5 milliGRAM(s) IV Push every 6 hours  mupirocin 2% Ointment 1 Application(s) Topical every 12 hours  senna 2 Tablet(s) Oral at bedtime  vancomycin  IVPB 1000 milliGRAM(s) IV Intermittent every 24 hours    MEDICATIONS  (PRN):  diphenhydrAMINE   Injectable 25 milliGRAM(s) IV Push once PRN Rash      CAPILLARY BLOOD GLUCOSE        I&O's Summary      PHYSICAL EXAM:  Vital Signs Last 24 Hrs  T(C): 36.4 (23 Feb 2021 12:24), Max: 36.7 (23 Feb 2021 11:37)  T(F): 97.5 (23 Feb 2021 12:24), Max: 98.1 (23 Feb 2021 11:37)  HR: 84 (23 Feb 2021 12:24) (58 - 92)  BP: 114/70 (23 Feb 2021 12:24) (113/73 - 151/77)  BP(mean): --  RR: 18 (23 Feb 2021 12:24) (16 - 20)  SpO2: 94% (23 Feb 2021 12:24) (94% - 100%)    Gen: Fatigued, but spontaneously opening eyes and following some basic commands (squeeze my hand, etc); significant cachexia. Moaning in pain  Head: NCAT  HEENT: PERRL, Oral mucosa is moderately dry, but w/interval improvement; normal conjunctiva, anicteric, neck supple  Lung: CTAB no wheezing/rales/rhonchi  CV: Normal rate w/irregularly irregular rhythm; No MRG	  Abd: soft, NTND, no rebound or guarding, +BSx4  MSK: No edema, no visible deformities  Neuro: Moving all extremity grossly, but poor movement of L arm. L arm mildly shabana and pt has difficulty following commands w/L hand (reported as chronic s/p CVA). AAOx0. Spontaneously opening eyes and intermittently moaning to verbal stimulation. Non-conversant, but did answer yes to questions regarding pain; otherwise unable to localize her pain  Skin: 2x2cm stage 2 ulcer of the medial aspect of the L great toe w/surrounding erythema and warmth; possible exposed bone; 2mm stage 2 chronic ulcer to base of R 5th toe    LABS:                        12.3   5.61  )-----------( 163      ( 23 Feb 2021 06:58 )             40.8     02-23    141  |  108  |  16  ----------------------------<  189<H>  4.1   |  24  |  0.44<L>    Ca    8.8      23 Feb 2021 06:58  Phos  2.7     02-23  Mg     2.3     02-23    TPro  6.4  /  Alb  3.0<L>  /  TBili  0.3  /  DBili  x   /  AST  24  /  ALT  21  /  AlkPhos  171<H>  02-23    PT/INR - ( 22 Feb 2021 18:40 )   PT: 14.0 sec;   INR: 1.18 ratio         PTT - ( 22 Feb 2021 18:40 )  PTT:27.4 sec            RADIOLOGY & ADDITIONAL TESTS:  Results Reviewed:   Imaging Personally Reviewed:  Electrocardiogram Personally Reviewed:    COORDINATION OF CARE:  Care Discussed with Consultants/Other Providers [Y/N]:  Prior or Outpatient Records Reviewed [Y/N]:

## 2021-02-23 NOTE — PROGRESS NOTE ADULT - PROBLEM SELECTOR PLAN 3
2/2 fall while on eliquis in 9/2020  -Hold eliquis   -CT  -Consider neurology vs. neurosurgery c/s PRN 2/2 fall while on eliquis in 9/2020  -Hold eliquis   -CTH  Chronic L subdural on CTH w/o midline shift  -Consider neurology vs. neurosurgery c/s PRN

## 2021-02-23 NOTE — PHYSICAL THERAPY INITIAL EVALUATION ADULT - DIAGNOSIS, PT EVAL
pt at baseline, no PT needs / podiatry following for WC of b/l feet decreased strength and mobility / podiatry following for WC of b/l feet

## 2021-02-23 NOTE — PHYSICAL THERAPY INITIAL EVALUATION ADULT - PERTINENT HX OF CURRENT PROBLEM, REHAB EVAL
89 y/o F, PMHx of dementia, HTN, DM2, afib c/b CVA, and traumatic bilateral SDH s/p fall in 9/2020 c/b seizures. Pt BIBEMS from nursing home for 1 day of AMS. Pt is reportedly AAOx2 and interactive at baseline, but is now lethargic, minimally conversant, and only following basic commands.

## 2021-02-23 NOTE — PROGRESS NOTE ADULT - PROBLEM SELECTOR PLAN 1
DDx metabolic vs. septic vs. neurological  -Full septic w/u including BCx, CXR, UA, UCx, VBG w/lactate  -Metabolic evaluation including CMP, CBC, VBG w/lactate  -Neurological w/u including CTH non-con; consider neuro c/s PRN  -Evaluation of the foot wound by podiatry and imaging; ESR/CRP  -s/p vanc/zosyn in ED; now transitioned to vanc/cefepime DDx metabolic vs. septic vs. neurological  -Full septic w/u including BCx, CXR, UA, UCx, VBG w/lactate  CXR and UA unremarkable  VBG w/mild respiratory acidosis; no gap  Normal lactate  -Metabolic evaluation including CMP, CBC, VBG w/lactate  Hypernatremia; resolved o/n on 1st day of admission w/D5w therapy  -Neurological w/u including CTH non-con; consider neuro c/s W[place]  chronic subdural on the R; no acute neurologic findings  -Evaluation of the foot wound by podiatry and imaging; ESR/CRP  ESR/CRP elevated; XR w/cortical bone destruction c/f osteo; podiatry recommends MRI  MRI ordered; f/u results  -s/p vanc/zosyn in ED; now transitioned to vanc/cefepime  c/w vanc/cefepime

## 2021-02-23 NOTE — CONSULT NOTE ADULT - SUBJECTIVE AND OBJECTIVE BOX
HPI:  89yo woman h/o dementia, HTN, DMII, atrial fibrillation, traumatic b/l SDH on eliquis 9/2020 and seizures admitted for AMS from nursing home with Neurology consult for such. Per documentation, patient AAO x 2 at baseline and has experienced cognitive decline requiring placement in assisted living facility [facility] (), spends time in bed and developed ulcerated lesions of foot. In EMS patient noted to be hypotensive to 90s systolic bp. Concern for sepsis and currently being treated with vanc/zosyn.     Patient's son is Hospitalist (Dr. Andre Reyes).     REVIEW OF SYSTEMS  unable to obtain at this time    PAST MEDICAL & SURGICAL HISTORY:  Seizure    Cerebrovascular accident (CVA)    HTN (hypertension)    Diabetes    Dementia    Atrial fibrillation    SDH (subdural hematoma)    No significant past surgical history      FAMILY HISTORY:  FH: diabetes mellitus    MEDICATIONS (HOME):  Home Medications:  docusate sodium 100 mg oral capsule: 1 cap(s) orally 3 times a day, As Needed (22 Feb 2021 19:00)  Eliquis 2.5 mg oral tablet: 1 tab(s) orally every 12 hours (22 Feb 2021 19:00)  furosemide 20 mg oral tablet: 1 tab(s) orally once a day (22 Feb 2021 19:00)  levETIRAcetam 100 mg/mL oral solution: 7.5 milliliter(s) orally 2 times a day  (9am &amp; 5pm) (22 Feb 2021 19:00)  Metoprolol Tartrate 25 mg oral tablet: 1 tab(s) orally every 12 hours (22 Feb 2021 19:00)  mupirocin 2% topical ointment: Apply topically to affected area once a day( 5th right toe) (22 Feb 2021 19:00)  phenytoin 25 mg/mL oral suspension: 4 milliliter(s) orally every 8 hours  (6am,2pm,10pm) (22 Feb 2021 19:00)  Santyl 250 units/g topical ointment: Apply topically to affected area once a day (22 Feb 2021 19:00)  senna 8.6 mg oral tablet: 2 tab(s) orally once a day (at bedtime) (22 Feb 2021 19:00)  Tylenol 325 mg oral tablet: 2 tab(s) orally every 6 hours, As Needed (22 Feb 2021 19:00)    MEDICATIONS  (STANDING):  cefepime   IVPB 1000 milliGRAM(s) IV Intermittent every 12 hours  collagenase Ointment 1 Application(s) Topical daily  dextrose 5%. 1000 milliLiter(s) (50 mL/Hr) IV Continuous <Continuous>  levETIRAcetam  Solution 500 milliGRAM(s) Oral two times a day  metoprolol tartrate 25 milliGRAM(s) Oral every 12 hours  mupirocin 2% Ointment 1 Application(s) Topical every 12 hours  phenytoin   Suspension 100 milliGRAM(s) Oral three times a day  senna 2 Tablet(s) Oral at bedtime  vancomycin  IVPB 1000 milliGRAM(s) IV Intermittent every 24 hours    MEDICATIONS  (PRN):  acetaminophen   Tablet .. 650 milliGRAM(s) Oral every 6 hours PRN Temp greater or equal to 38C (100.4F), Mild Pain (1 - 3)  diphenhydrAMINE   Injectable 25 milliGRAM(s) IV Push once PRN Rash    ALLERGIES/INTOLERANCES:  Allergies  penicillin (Rash)    VITALS & EXAMINATION:  Vital Signs Last 24 Hrs  T(C): 36 (22 Feb 2021 21:45), Max: 36.2 (22 Feb 2021 16:10)  T(F): 96.8 (22 Feb 2021 21:45), Max: 97.1 (22 Feb 2021 16:10)  HR: 68 (22 Feb 2021 21:45) (68 - 87)  BP: 151/77 (22 Feb 2021 21:45) (113/73 - 151/77)  BP(mean): --  RR: 18 (22 Feb 2021 21:45) (16 - 19)  SpO2: 99% (22 Feb 2021 21:45) (95% - 99%)    Neurological (>12):  MS: eyes closed, opens eyes to noxious stim and trap squeeze, no verbal output, does not follow simple commands    CNs: patient fights eyelid opening unable to observe pupil size; difficult to evaluate VF. no gross facial asymmetry    Motor: moves extremities equally to noxious stim	     LABORATORY:  CBC                       13.0   5.56  )-----------( 209      ( 22 Feb 2021 18:40 )             44.3     Chem 02-22    152<H>  |  114<H>  |  20  ----------------------------<  136<H>  4.5   |  30  |  0.61    Ca    9.4      22 Feb 2021 18:40  Phos  3.6     02-22  Mg     2.6     02-22    TPro  7.0  /  Alb  3.5  /  TBili  0.3  /  DBili  x   /  AST  23  /  ALT  24  /  AlkPhos  189<H>  02-22    LFTs LIVER FUNCTIONS - ( 22 Feb 2021 18:40 )  Alb: 3.5 g/dL / Pro: 7.0 g/dL / ALK PHOS: 189 U/L / ALT: 24 U/L / AST: 23 U/L / GGT: x           Coagulopathy PT/INR - ( 22 Feb 2021 18:40 )   PT: 14.0 sec;   INR: 1.18 ratio         PTT - ( 22 Feb 2021 18:40 )  PTT:27.4 sec    STUDIES & IMAGING:  Studies (EKG, EEG, EMG, etc):     Radiology (XR, CT, MR, U/S, TTE/CELSO):    < from: CT Head No Cont (02.22.21 @ 20:14) >  IMPRESSION:    Small chronic appearing right holohemispheric subdural hematoma without midline shift and measuring up to 1.0 cm.    < end of copied text >

## 2021-02-24 NOTE — PROGRESS NOTE ADULT - PROBLEM SELECTOR PLAN 1
DDx metabolic vs. septic vs. neurological  -Full septic w/u including BCx, CXR, UA, UCx, VBG w/lactate  CXR and UA unremarkable  VBG w/mild respiratory acidosis; no gap  Normal lactate  -Metabolic evaluation including CMP, CBC, VBG w/lactate  Hypernatremia; resolved o/n on 1st day of admission w/D5w therapy  -Neurological w/u including CTH non-con; consider neuro c/s TN  chronic subdural on the R; no acute neurologic findings  -Evaluation of the foot wound by podiatry and imaging; ESR/CRP  ESR/CRP elevated; XR w/cortical bone destruction c/f osteo; podiatry recommends MRI  MRI ordered; f/u results  -s/p vanc/zosyn in ED; now transitioned to vanc/cefepime  c/w vanc/cefepime DDx metabolic vs. septic vs. neurological  -Full septic w/u including BCx, CXR, UA, UCx, VBG w/lactate  CXR and UA unremarkable  VBG w/mild respiratory acidosis; no gap  Normal lactate    -Metabolic evaluation including CMP, CBC, VBG w/lactate  Hypernatremia; resolved o/n on 1st day of admission w/D5w therapy    -Neurological w/u including CTH non-con; consider neuro c/s SD  chronic subdural on the R; no acute neurologic findings  Brain MRI findings c/f empyema within chronic SDH  ID c/s called; f/u recs    -Evaluation of the foot wound by podiatry and imaging; ESR/CRP  ESR/CRP elevated; XR w/cortical bone destruction c/f osteo; podiatry recommends   MRI ordered for foot; f/u results    -s/p vanc/zosyn in ED; now transitioned to vanc/cefepime  c/w vanc/cefepime; increase cefepime to 2g q12h given MRI findings c/f empyema

## 2021-02-24 NOTE — PROGRESS NOTE ADULT - ASSESSMENT
89F w/PMHx of dementia, HTN, DM2, afib c/b CVA, and recent traumatic bilateral SDH s/p fall in 9/2020 c/b seizures who is BIBEMS from nursing home for 1 day of AMS. Pt is reportedly AAOx2 and interactive at baseline, but is now lethargic, minimally conversant, and only following basic commands. Multiple possible etiologies for AMS including sepsis ISO multiple open sores, active seizure, recurring ICH, metabolic disturbance.  89F w/PMHx of dementia, HTN, DM2, afib c/b CVA, and recent traumatic bilateral SDH s/p fall in 9/2020 c/b seizures who is BIBEMS from nursing home for 1 day of AMS. Pt is reportedly AAOx2 and interactive at baseline, but is now lethargic, minimally conversant, and only following basic commands. Multiple possible etiologies for AMS including sepsis ISO multiple open sores, active seizure, recurring ICH, metabolic disturbance. Now w/MRI finding showing rim enhancing lesion in previously demonstrated SDH that is c/f possible infection

## 2021-02-24 NOTE — PROGRESS NOTE ADULT - PROBLEM SELECTOR PLAN 2
2x2cm stage 2 ulcer of the medial aspect of the L great toe w/surrounding erythema and warmth; possible exposed bone  -Podiatry c/s placed; recs appreciated  -Wound care c/s placed  -s/p vanc/zosyn in ED; now transitioned to vanc/cefepime  ESR/CRP elevated; XR c/f osteo; MRI ordered

## 2021-02-24 NOTE — CONSULT NOTE ADULT - ASSESSMENT
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 In the ED afebrile, normotensive and not tachycardic. On presentation WBC of 5.56 with 55.4% PMN.     ESR (2/23) of 55.   CRP (2/22) of 1.65.   COVID19 PCR (2/22) Negative. COVID19 Antibody (2/23) Negative.   Blood Cultures (2/23) NGTD.     CXR (2/22) with clear lungs.     XR LLE Foot (2/22) Soft tissue ulceration along the medial aspect of the first metatarsophalangeal joint on the left. Mild cortical irregularity of the adjacent medial aspect of the first metatarsal head    MRI Head (2/23) with Similar right lateral convexity subdural collection measures 1.1 cm in greatest depth. The collection peripherally enhances.    I discussed MRI Head findings with neuroradiology - less concern for abscess - appears more consistent with chronic subdural hematoma. Patient also without leukocytosis or fever. Doubt subdural abscess.     Patient with LLE wound which does not appear infected at my evaluation. Podiatry did not a small amount of erythema around the ulcer previously. I will discuss with Podiatry tomorrow but for now I would discontinue antibiotics. MRI LLE is reasonable but if there is OM on MRI I suspect this would represent chronic OM. Doubt long term antibiotics would be of benefit.     RECOMMENDATIONS:    #LLE Wound  --No obvious cellulitis at this time; favor monitoring off of antibiotics. Will discuss further with Podiatry tomorrow AM  --Followup on MRI imaging of bilateral feet   --Continue to follow CBC with diff  --Continue to follow temperature curve  --Follow up on preliminary blood cultures    #Encephalopathy, Abnormal Brain MRI  --Doubt Empyema (see above) - I would not utilize antibiotics for this at this time  --Continue to monitor mental status and cultures    I will continue to follow. Please feel free to contact me with any further questions.    Javier Bravo M.D.  [facility] Division of Infectious Disease  8AM-5PM: Pager Number [phone]  After Hours (or if no response): Please contact the Infectious Diseases Office at [phone]     The above assessment and plan were discussed with Dr Finch, also discussed case and plan with patient's Grandson Dr. Andre Reyes  89F w/PMHx of dementia, HTN, DM2, afib c/b CVA, and traumatic bilateral SDH s/p fall in 9/2020 c/b seizures who is BIBEMS from nursing home for 1 day of AMS. In the ED afebrile, normotensive and not tachycardic. On presentation WBC of 5.56 with 55.4% PMN.     ESR (2/23) of 55.   CRP (2/22) of 1.65.   COVID19 PCR (2/22) Negative. COVID19 Antibody (2/23) Negative.   Blood Cultures (2/23) NGTD.     CXR (2/22) with clear lungs.     XR LLE Foot (2/22) Soft tissue ulceration along the medial aspect of the first metatarsophalangeal joint on the left. Mild cortical irregularity of the adjacent medial aspect of the first metatarsal head    MRI Head (2/23) with Similar right lateral convexity subdural collection measures 1.1 cm in greatest depth. The collection peripherally enhances.    I discussed MRI Head findings with neuroradiology - less concern for abscess - appears more consistent with chronic subdural hematoma. Patient also without leukocytosis or fever. Doubt subdural abscess.     Patient with LLE wound which does not appear infected at my evaluation. Podiatry did note a small amount of erythema around the ulcer previously. I will discuss with Podiatry tomorrow but for now I would discontinue antibiotics. MRI LLE is reasonable but if there is OM on MRI I suspect this would represent chronic OM. Doubt long term antibiotics would be of benefit.     RECOMMENDATIONS:    #LLE Wound  --No obvious cellulitis at this time; favor monitoring off of antibiotics. Will discuss further with Podiatry tomorrow AM  --Followup on MRI imaging of bilateral feet   --Continue to follow CBC with diff  --Continue to follow temperature curve  --Follow up on preliminary blood cultures    #Encephalopathy, Abnormal Brain MRI  --Doubt Empyema (see above) - I would not utilize antibiotics for this at this time  --Continue to monitor mental status and cultures    I will continue to follow. Please feel free to contact me with any further questions.    Javier Bravo M.D.  [facility] Division of Infectious Disease  8AM-5PM: Pager Number [phone]  After Hours (or if no response): Please contact the Infectious Diseases Office at [phone]     The above assessment and plan were discussed with Dr Finch, also discussed case and plan with patient's Grandson Dr. Andre Reyes

## 2021-02-24 NOTE — PROGRESS NOTE ADULT - PROBLEM SELECTOR PLAN 4
Hx of seizure following SDH  -continue home seizure meds  -transitioned to phospheyntoin given ams and NPO Hx of seizure following SDH  -continue home seizure meds  -Transitioned to PO meds again  -Keppra 750 reinstated (home dose)

## 2021-02-24 NOTE — CONSULT NOTE ADULT - SUBJECTIVE AND OBJECTIVE BOX
Patient is a 89y old  Female who presents with a chief complaint of AMS (24 Feb 2021 13:18)      HPI:    89F w/PMHx of dementia, HTN, DM2, afib c/b CVA, and traumatic bilateral SDH s/p fall in 9/2020 c/b seizures who is BIBEMS from nursing home for 1 day of AMS. Pt is reportedly AAOx2 and interactive at baseline, but is now lethargic, minimally conversant, and only following basic commands. Collateral obtained from pt's grandson (Dr. Andre Reyes) at bedside and pt's daughter (Simran) via telephone. Pt is reported to have endured bilateral subdural hematomas following a fall out of bed in late September to early October of 2020 while on AC for her a. fib. Ms. Hernandes then began having seizures following her d/c and had to be rehospitalized for management of seizure. Pt experienced significant cognitive decline and required placement in assisted living facility. Pt currently resides at [facility] ([phone]) where another family member works at the facility and is able to check in on her.     Since her cognitive decline she has progressively lost her mobility and spends the majority of time in bed at present. Pt has since developed multiple ulcerated lesions of the foot, including a L foot lesion that is reported to be getting larger, warm, and red. Pt awoke this morning with acutely altered mental status and was send in by facility to [facility] ED for concerns of sepsis vs. neurologic dysfunction. Per EMS was hypotensive to 90s systolic.     Pt's son is a hospitalist here at [facility] (Dr. Andre Reyes)    In the ED afebrile, normotensive and not tachycardic. On presentation WBC of 5.56 with 55.4% PMN. ESR (2/23) of 55. CRP (2/22) of 1.65. COVID19 PCR (2/22) Negative. COVID19 Antibody (2/23) Negative. Blood Cultures (2/23) NGTD.     CXR (2/22) with clear lungs. XR LLE Foot (2/22) Soft tissue ulceration along the medial aspect of the first metatarsophalangeal joint on the left. Mild cortical irregularity of the adjacent medial aspect of the first metatarsal head       prior hospital charts reviewed [  ]  primary team notes reviewed [  ]  other consultant notes reviewed [  ]    PAST MEDICAL & SURGICAL HISTORY:  Seizure    Cerebrovascular accident (CVA)    HTN (hypertension)    Diabetes    Dementia    Atrial fibrillation    SDH (subdural hematoma)    No significant past surgical history        Allergies  penicillin (Rash)    ANTIMICROBIALS (past 90 days)  MEDICATIONS  (STANDING):    cefepime   IVPB   100 mL/Hr IV Intermittent (02-24-21 @ 05:00)   100 mL/Hr IV Intermittent (02-23-21 @ 17:29)   100 mL/Hr IV Intermittent (02-23-21 @ 06:24)   100 mL/Hr IV Intermittent (02-22-21 @ 22:05)    piperacillin/tazobactam IVPB.   200 mL/Hr IV Intermittent (02-22-21 @ 18:30)    vancomycin  IVPB   250 mL/Hr IV Intermittent (02-22-21 @ 20:37)    vancomycin  IVPB   250 mL/Hr IV Intermittent (02-23-21 @ 21:48)      ANTIMICROBIALS:    cefepime   IVPB 1000 every 12 hours  vancomycin  IVPB 1000 every 24 hours    OTHER MEDS: MEDICATIONS  (STANDING):  diphenhydrAMINE   Injectable 25 once PRN  fosphenytoin IVPB 100 three times a day  levETIRAcetam  IVPB 500 every 12 hours  metoprolol tartrate Injectable 5 every 6 hours  senna 2 at bedtime    SOCIAL HISTORY:   hx smoking  non-smoker    FAMILY HISTORY:  FH: diabetes mellitus      REVIEW OF SYSTEMS  [  ] ROS unobtainable because:    [  ] All other systems negative except as noted below:	    Constitutional:  [ ] fever [ ] chills  [ ] weight loss  [ ] weakness  Skin:  [ ] rash [ ] phlebitis	  Eyes: [ ] icterus [ ] pain  [ ] discharge	  ENMT: [ ] sore throat  [ ] thrush [ ] ulcers [ ] exudates  Respiratory: [ ] dyspnea [ ] hemoptysis [ ] cough [ ] sputum	  Cardiovascular:  [ ] chest pain [ ] palpitations [ ] edema	  Gastrointestinal:  [ ] nausea [ ] vomiting [ ] diarrhea [ ] constipation [ ] pain	  Genitourinary:  [ ] dysuria [ ] frequency [ ] hematuria [ ] discharge [ ] flank pain  [ ] incontinence  Musculoskeletal:  [ ] myalgias [ ] arthralgias [ ] arthritis  [ ] back pain  Neurological:  [ ] headache [ ] seizures  [ ] confusion/altered mental status  Psychiatric:  [ ] anxiety [ ] depression	  Hematology/Lymphatics:  [ ] lymphadenopathy  Endocrine:  [ ] adrenal [ ] thyroid  Allergic/Immunologic:	 [ ] transplant [ ] seasonal    Vital Signs Last 24 Hrs  T(F): 98.7 (02-24-21 @ 11:51), Max: 98.7 (02-24-21 @ 11:51)  Vital Signs Last 24 Hrs  HR: 73 (02-24-21 @ 11:51) (73 - 94)  BP: 135/64 (02-24-21 @ 11:51) (120/68 - 138/83)  RR: 18 (02-24-21 @ 11:51)  SpO2: 97% (02-24-21 @ 11:51) (94% - 97%)  Wt(kg): --    PHYSICAL EXAMINATION:  General: Alert and Awake, NAD  HEENT: PERRL, EOMI, No subconjunctival hemorrhages, Oropharynx Clear, MMM  Neck: Supple, No UZIEL  Cardiac: RRR, No M/R/G  Resp: CTAB, No Wh/Rh/Ra  Abdomen: NBS, NT/ND, No HSM, No rigidity or guarding  MSK: No LE edema. No stigmata of IE. No evidence of phlebitis. No evidence of synovitis.  : No duque  Skin: No rashes or lesions. Skin is warm and dry to the touch.   Neuro: Alert and Awake. CN 2-12 Grossly intact. Moves all four extremities spontaneously.  Psych: Calm, Pleasant, Cooperative                          12.3   5.61  )-----------( 163      ( 23 Feb 2021 06:58 )             40.8     02-24    138  |  106  |  12  ----------------------------<  113<H>  4.2   |  19<L>  |  0.44<L>    Ca    8.5      24 Feb 2021 09:35  Phos  3.1     02-24  Mg     2.2     02-24    TPro  6.1  /  Alb  2.8<L>  /  TBili  0.3  /  DBili  x   /  AST  25  /  ALT  21  /  AlkPhos  176<H>  02-24    MICROBIOLOGY:    Culture - Blood (collected 23 Feb 2021 01:44)  Source: .Blood Blood-Peripheral  Preliminary Report (24 Feb 2021 02:02):    No growth to date.    Culture - Blood (collected 23 Feb 2021 01:42)  Source: .Blood Blood-Peripheral  Preliminary Report (24 Feb 2021 02:02):    No growth to date.    RADIOLOGY:    <The imaging below has been reviewed and visualized by me independently. Findings as detailed in report below>    EXAM:  XR CHEST PORTABLE URGENT 1V                        PROCEDURE DATE:  02/22/2021    Slightly limited secondary to patient rotation. Clear lungs.    EXAM:  FOOT COMPLETE BILAT-MIN 3 VIEWS                        PROCEDURE DATE:  02/22/2021    Soft tissue ulceration along the medial aspect of the first metatarsophalangeal joint on the left. Mild cortical irregularity of the adjacent medial aspect of the first metatarsal head may be degenerative/enthesopathic change or secondary to osteomyelitis. Correlate with pending MRI.  Otherwise, no radiographic evidence of osteomyelitis. No acute fractures or dislocations are demonstrated. 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 Bilateral calcaneal enthesophytes. Vascular calcifications.    EXAM:  CT BRAIN                        PROCEDURE DATE:  02/22/2021    Small chronic appearing right holohemispheric subdural hematoma without midline shift and measuring up to 1.0 cm.    EXAM:  MR BRAIN WAW IC                        PROCEDURE DATE:  02/23/2021    Similar right lateral convexity subdural collection measures 1.1 cm in greatest depth. The collection peripherally enhances. Minimal restricted diffusion within the posterior aspect of the collection. Findings could represent the presence of an empyema.  No abnormal parenchymal or leptomeningeal enhancement.  No acute parenchymal hemorrhage or acute infarction.  Similar minimal leftward midline shift. No effacement of basal cisterns. Similar mild ventricular dilatation.     Patient is a 89y old  Female who presents with a chief complaint of AMS (24 Feb 2021 13:18)      HPI:    89F w/PMHx of dementia, HTN, DM2, afib c/b CVA, and traumatic bilateral SDH s/p fall in 9/2020 c/b seizures who is BIBEMS from nursing home for 1 day of AMS. Pt is reportedly AAOx2 and interactive at baseline, but is now lethargic, minimally conversant, and only following basic commands. Collateral obtained from pt's grandson (Dr. Andre Reyes) at bedside and pt's daughter (Simran) via telephone. Pt is reported to have endured bilateral subdural hematomas following a fall out of bed in late September to early October of 2020 while on AC for her a. fib. Ms. Hernandes then began having seizures following her d/c and had to be rehospitalized for management of seizure. Pt experienced significant cognitive decline and required placement in assisted living facility. Pt currently resides at [facility] ([phone]) where another family member works at the facility and is able to check in on her.     Since her cognitive decline she has progressively lost her mobility and spends the majority of time in bed at present. Pt has since developed multiple ulcerated lesions of the foot, including a L foot lesion that is reported to be getting larger, warm, and red. Pt awoke this morning with acutely altered mental status and was send in by facility to [facility] ED for concerns of sepsis vs. neurologic dysfunction. Per EMS was hypotensive to 90s systolic.     In the ED afebrile, normotensive and not tachycardic. On presentation WBC of 5.56 with 55.4% PMN. ESR (2/23) of 55. CRP (2/22) of 1.65. COVID19 PCR (2/22) Negative. COVID19 Antibody (2/23) Negative. Blood Cultures (2/23) NGTD.     CXR (2/22) with clear lungs. XR LLE Foot (2/22) Soft tissue ulceration along the medial aspect of the first metatarsophalangeal joint on the left. Mild cortical irregularity of the adjacent medial aspect of the first metatarsal head     prior hospital charts reviewed [  ]  primary team notes reviewed [ x ]  other consultant notes reviewed [ x ]    PAST MEDICAL & SURGICAL HISTORY:  Seizure    Cerebrovascular accident (CVA)    HTN (hypertension)    Diabetes    Dementia    Atrial fibrillation    SDH (subdural hematoma)    No significant past surgical history        Allergies  penicillin (Rash)    ANTIMICROBIALS (past 90 days)  MEDICATIONS  (STANDING):    cefepime   IVPB   100 mL/Hr IV Intermittent (02-24-21 @ 05:00)   100 mL/Hr IV Intermittent (02-23-21 @ 17:29)   100 mL/Hr IV Intermittent (02-23-21 @ 06:24)   100 mL/Hr IV Intermittent (02-22-21 @ 22:05)    piperacillin/tazobactam IVPB.   200 mL/Hr IV Intermittent (02-22-21 @ 18:30)    vancomycin  IVPB   250 mL/Hr IV Intermittent (02-22-21 @ 20:37)    vancomycin  IVPB   250 mL/Hr IV Intermittent (02-23-21 @ 21:48)      ANTIMICROBIALS:    cefepime   IVPB 1000 every 12 hours  vancomycin  IVPB 1000 every 24 hours    OTHER MEDS: MEDICATIONS  (STANDING):  diphenhydrAMINE   Injectable 25 once PRN  fosphenytoin IVPB 100 three times a day  levETIRAcetam  IVPB 500 every 12 hours  metoprolol tartrate Injectable 5 every 6 hours  senna 2 at bedtime    SOCIAL HISTORY: unable to obtain given patient's dementia    FAMILY HISTORY:  FH: diabetes mellitus      REVIEW OF SYSTEMS  [x  ] ROS unobtainable because:  unable to obtain given patient's dementia  [  ] All other systems negative except as noted below:	    Constitutional:  [ ] fever [ ] chills  [ ] weight loss  [ ] weakness  Skin:  [ ] rash [ ] phlebitis	  Eyes: [ ] icterus [ ] pain  [ ] discharge	  ENMT: [ ] sore throat  [ ] thrush [ ] ulcers [ ] exudates  Respiratory: [ ] dyspnea [ ] hemoptysis [ ] cough [ ] sputum	  Cardiovascular:  [ ] chest pain [ ] palpitations [ ] edema	  Gastrointestinal:  [ ] nausea [ ] vomiting [ ] diarrhea [ ] constipation [ ] pain	  Genitourinary:  [ ] dysuria [ ] frequency [ ] hematuria [ ] discharge [ ] flank pain  [ ] incontinence  Musculoskeletal:  [ ] myalgias [ ] arthralgias [ ] arthritis  [ ] back pain  Neurological:  [ ] headache [ ] seizures  [ ] confusion/altered mental status  Psychiatric:  [ ] anxiety [ ] depression	  Hematology/Lymphatics:  [ ] lymphadenopathy  Endocrine:  [ ] adrenal [ ] thyroid  Allergic/Immunologic:	 [ ] transplant [ ] seasonal    Vital Signs Last 24 Hrs  T(F): 98.7 (02-24-21 @ 11:51), Max: 98.7 (02-24-21 @ 11:51)  Vital Signs Last 24 Hrs  HR: 73 (02-24-21 @ 11:51) (73 - 94)  BP: 135/64 (02-24-21 @ 11:51) (120/68 - 138/83)  RR: 18 (02-24-21 @ 11:51)  SpO2: 97% (02-24-21 @ 11:51) (94% - 97%)  Wt(kg): --    PHYSICAL EXAMINATION:  General: Awake but not alert. NAD  HEENT: PERRL, EOMI  Neck: Supple  Cardiac: RRR, No M/R/G  Resp: CTAB, No Wh/Rh/Ra  Abdomen: NBS, NT/ND, No HSM, No rigidity or guarding  MSK: LLE medial 1st MPJ wound with fibrotic base - no erythema or drainage. RLE 5th digit wound which is dry and without erythema.  No LE edema.   Skin: No rashes or lesions. Skin is warm and dry to the touch.   Neuro: Awake but not alert. NAD  Psych: unable to assess given dementia                           12.3   5.61  )-----------( 163      ( 23 Feb 2021 06:58 )             40.8     02-24    138  |  106  |  12  ----------------------------<  113<H>  4.2   |  19<L>  |  0.44<L>    Ca    8.5      24 Feb 2021 09:35  Phos  3.1     02-24  Mg     2.2     02-24    TPro  6.1  /  Alb  2.8<L>  /  TBili  0.3  /  DBili  x   /  AST  25  /  ALT  21  /  AlkPhos  176<H>  02-24    MICROBIOLOGY:    Culture - Blood (collected 23 Feb 2021 01:44)  Source: .Blood Blood-Peripheral  Preliminary Report (24 Feb 2021 02:02):    No growth to date.    Culture - Blood (collected 23 Feb 2021 01:42)  Source: .Blood Blood-Peripheral  Preliminary Report (24 Feb 2021 02:02):    No growth to date.    RADIOLOGY:    <The imaging below has been reviewed and visualized by me independently. Findings as detailed in report below>    EXAM:  XR CHEST PORTABLE URGENT 1V                        PROCEDURE DATE:  02/22/2021    Slightly limited secondary to patient rotation. Clear lungs.    EXAM:  FOOT COMPLETE BILAT-MIN 3 VIEWS                        PROCEDURE DATE:  02/22/2021    Soft tissue ulceration along the medial aspect of the first metatarsophalangeal joint on the left. Mild cortical irregularity of the adjacent medial aspect of the first metatarsal head may be degenerative/enthesopathic change or secondary to osteomyelitis. Correlate with pending MRI.  Otherwise, no radiographic evidence of osteomyelitis. No acute fractures or dislocations are demonstrated. 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 Bilateral calcaneal enthesophytes. Vascular calcifications.    EXAM:  CT BRAIN                        PROCEDURE DATE:  02/22/2021    Small chronic appearing right holohemispheric subdural hematoma without midline shift and measuring up to 1.0 cm.    EXAM:  MR BRAIN WAW IC                        PROCEDURE DATE:  02/23/2021    Similar right lateral convexity subdural collection measures 1.1 cm in greatest depth. The collection peripherally enhances. Minimal restricted diffusion within the posterior aspect of the collection. Findings could represent the presence of an empyema.  No abnormal parenchymal or leptomeningeal enhancement.  No acute parenchymal hemorrhage or acute infarction.  Similar minimal leftward midline shift. No effacement of basal cisterns. Similar mild ventricular dilatation.

## 2021-02-24 NOTE — PROGRESS NOTE ADULT - ASSESSMENT
Pt is 89yo who presents w/ bilateral foot wounds  -Pt was seen and examined  -afebrile, no leukocytosis   -Right foot dorsal fifth digit wound at the PIPJ to bone w/ fibrogranular wound bed, no periwound erythema, no malodor, no local signs of infection.  -Left foot medial first MPJ wound to bone w/ fibrotic wound base, w/ periwound erythema 1cm beyond the wound edges. There is no active drainage, or purulence noted.   -RFXR: demonstrating  Mild cortical irregularity of the adjacent medial aspect of the first metatarsal head may be degenerative/enthesopathic change or secondary to osteomyelitis.  - Pod plan pending MRI results   -Seen w/ attending

## 2021-02-24 NOTE — PROGRESS NOTE ADULT - PROBLEM SELECTOR PLAN 3
2/2 fall while on eliquis in 9/2020  -Hold eliquis   -CTH  Chronic L subdural on CTH w/o midline shift  -Consider neurology vs. neurosurgery c/s PRN 2/2 fall while on eliquis in 9/2020  -Hold eliquis   -CTH  Chronic L subdural on CTH w/o midline shift; MR c/f empyema within SDH  -ID c/s placed; will f/u recs prior to involving other services  -Consider neurology vs. neurosurgery c/s PRN  -cefepime to 2g q12h given MRI findings c/f empyema

## 2021-02-25 NOTE — DISCHARGE NOTE PROVIDER - NSDCMRMEDTOKEN_GEN_ALL_CORE_FT
[facility] requesting refill on pt's TEMAZEPAM 15 MG Oral Cap    Last refilled: 1/22/20 - 90 caps - 0 refills  Last OV: 11/25/19 docusate sodium 100 mg oral capsule: 1 cap(s) orally 3 times a day, As Needed  Eliquis 2.5 mg oral tablet: 1 tab(s) orally every 12 hours  furosemide 20 mg oral tablet: 1 tab(s) orally once a day  levETIRAcetam 100 mg/mL oral solution: 7.5 milliliter(s) orally 2 times a day  (9am &amp; 5pm)  Metoprolol Tartrate 25 mg oral tablet: 1 tab(s) orally every 12 hours  mupirocin 2% topical ointment: Apply topically to affected area once a day( 5th right toe)  phenytoin 25 mg/mL oral suspension: 4 milliliter(s) orally every 8 hours  (6am,2pm,10pm)  Santyl 250 units/g topical ointment: Apply topically to affected area once a day  senna 8.6 mg oral tablet: 2 tab(s) orally once a day (at bedtime)  Tylenol 325 mg oral tablet: 2 tab(s) orally every 6 hours, As Needed   docusate sodium 100 mg oral capsule: 1 cap(s) orally 3 times a day, As Needed  furosemide 20 mg oral tablet: 1 tab(s) orally once a day  levETIRAcetam 100 mg/mL oral solution: 7.5 milliliter(s) orally 2 times a day  (9am &amp; 5pm)  Metoprolol Tartrate 25 mg oral tablet: 1 tab(s) orally every 12 hours  mupirocin 2% topical ointment: Apply topically to affected area once a day( 5th right toe)  phenytoin 25 mg/mL oral suspension: 4 milliliter(s) orally every 8 hours  (6am,2pm,10pm)  Santyl 250 units/g topical ointment: Apply topically to affected area once a day  senna 8.6 mg oral tablet: 2 tab(s) orally once a day (at bedtime)  Tylenol 325 mg oral tablet: 2 tab(s) orally every 6 hours, As Needed

## 2021-02-25 NOTE — PROGRESS NOTE ADULT - SUBJECTIVE AND OBJECTIVE BOX
Follow Up:  ?Brain Abscess, LE Wounds     Interval History:    REVIEW OF SYSTEMS  [  ] ROS unobtainable because:    [  ] All other systems negative except as noted below    Constitutional:  [ ] fever [ ] chills  [ ] weight loss  [ ] weakness  Skin:  [ ] rash [ ] phlebitis	  Eyes: [ ] icterus [ ] pain  [ ] discharge	  ENMT: [ ] sore throat  [ ] thrush [ ] ulcers [ ] exudates  Respiratory: [ ] dyspnea [ ] hemoptysis [ ] cough [ ] sputum	  Cardiovascular:  [ ] chest pain [ ] palpitations [ ] edema	  Gastrointestinal:  [ ] nausea [ ] vomiting [ ] diarrhea [ ] constipation [ ] pain	  Genitourinary:  [ ] dysuria [ ] frequency [ ] hematuria [ ] discharge [ ] flank pain  [ ] incontinence  Musculoskeletal:  [ ] myalgias [ ] arthralgias [ ] arthritis  [ ] back pain  Neurological:  [ ] headache [ ] seizures  [ ] confusion/altered mental status    Allergies  penicillin (Rash)        ANTIMICROBIALS:      OTHER MEDS:  MEDICATIONS  (STANDING):  diphenhydrAMINE   Injectable 25 once PRN  levETIRAcetam 750 two times a day  metoprolol tartrate 25 every 12 hours  phenytoin   Suspension 100 three times a day  senna 2 at bedtime      Vital Signs Last 24 Hrs  T(C): 36.4 (2021 11:16), Max: 37 (2021 04:24)  T(F): 97.5 (2021 11:16), Max: 98.6 (2021 04:24)  HR: 76 (2021 11:16) (69 - 76)  BP: 150/83 (2021 11:16) (133/81 - 150/83)  BP(mean): --  RR: 18 (2021 11:16) (18 - 18)  SpO2: 98% (2021 11:16) (98% - 99%)    PHYSICAL EXAMINATION:  General: Alert and Awake, NAD  HEENT: PERRL, EOMI  Neck: Supple  Cardiac: RRR, No M/R/G  Resp: CTAB, No Wh/Rh/Ra  Abdomen: NBS, NT/ND, No HSM, No rigidity or guarding  MSK: No LE edema. No Calf tenderness  : No duque  Skin: No rashes or lesions. Skin is warm and dry to the touch.   Neuro: Alert and Awake. CN 2-12 Grossly intact. Moves all four extremities spontaneously.  Psych: Calm, Pleasant, Cooperative                          13.3   4.74  )-----------( 163      ( 2021 06:55 )             41.6           141  |  106  |  10  ----------------------------<  119<H>  4.2   |  27  |  0.45<L>    Ca    8.7      2021 06:55  Phos  3.2       Mg     2.3         TPro  6.0  /  Alb  2.9<L>  /  TBili  0.3  /  DBili  x   /  AST  18  /  ALT  18  /  AlkPhos  184<H>        Urinalysis Basic - ( 2021 07:55 )    Color: Light Yellow / Appearance: Slightly Turbid / S.018 / pH: x  Gluc: x / Ketone: Negative  / Bili: Negative / Urobili: Negative   Blood: x / Protein: Negative / Nitrite: Negative   Leuk Esterase: Moderate / RBC: 19 /hpf / WBC 3 /HPF   Sq Epi: x / Non Sq Epi: 1 /hpf / Bacteria: Negative        MICROBIOLOGY:  v  .Blood Blood-Peripheral  21   No growth to date.  --  --      .Blood Blood-Peripheral  21   No growth to date.  --  --                RADIOLOGY:    <The imaging below has been reviewed and visualized by me independently. Findings as detailed in report below> Follow Up:  ?Brain Abscess, LE Wounds     Interval History: afebrile overnight. no acute overnight events.     REVIEW OF SYSTEMS  [x  ] ROS unobtainable because:  unable to obtain given patient's dementia  [  ] All other systems negative except as noted below    Constitutional:  [ ] fever [ ] chills  [ ] weight loss  [ ] weakness  Skin:  [ ] rash [ ] phlebitis	  Eyes: [ ] icterus [ ] pain  [ ] discharge	  ENMT: [ ] sore throat  [ ] thrush [ ] ulcers [ ] exudates  Respiratory: [ ] dyspnea [ ] hemoptysis [ ] cough [ ] sputum	  Cardiovascular:  [ ] chest pain [ ] palpitations [ ] edema	  Gastrointestinal:  [ ] nausea [ ] vomiting [ ] diarrhea [ ] constipation [ ] pain	  Genitourinary:  [ ] dysuria [ ] frequency [ ] hematuria [ ] discharge [ ] flank pain  [ ] incontinence  Musculoskeletal:  [ ] myalgias [ ] arthralgias [ ] arthritis  [ ] back pain  Neurological:  [ ] headache [ ] seizures  [ ] confusion/altered mental status    Allergies  penicillin (Rash)        ANTIMICROBIALS:      OTHER MEDS:  MEDICATIONS  (STANDING):  diphenhydrAMINE   Injectable 25 once PRN  levETIRAcetam 750 two times a day  metoprolol tartrate 25 every 12 hours  phenytoin   Suspension 100 three times a day  senna 2 at bedtime      Vital Signs Last 24 Hrs  T(C): 36.4 (2021 11:16), Max: 37 (2021 04:24)  T(F): 97.5 (2021 11:16), Max: 98.6 (2021 04:24)  HR: 76 (2021 11:16) (69 - 76)  BP: 150/83 (2021 11:16) (133/81 - 150/83)  BP(mean): --  RR: 18 (2021 11:16) (18 - 18)  SpO2: 98% (2021 11:16) (98% - 99%)    PHYSICAL EXAMINATION:  General: Awake but not alert. NAD  HEENT: PERRL, EOMI  Neck: Supple  Cardiac: RRR, No M/R/G  Resp: CTAB, No Wh/Rh/Ra  Abdomen: NBS, NT/ND, No HSM, No rigidity or guarding  MSK: Dressing over RLE and LLE feet. No LE edema   Skin: No rashes or lesions. Skin is warm and dry to the touch.   Neuro: Awake but not alert. NAD  Psych: unable to assess given dementia                             13.3   4.74  )-----------( 163      ( 2021 06:55 )             41.6           141  |  106  |  10  ----------------------------<  119<H>  4.2   |  27  |  0.45<L>    Ca    8.7      2021 06:55  Phos  3.2       Mg     2.3         TPro  6.0  /  Alb  2.9<L>  /  TBili  0.3  /  DBili  x   /  AST  18  /  ALT  18  /  AlkPhos  184<H>        Urinalysis Basic - ( 2021 07:55 )    Color: Light Yellow / Appearance: Slightly Turbid / S.018 / pH: x  Gluc: x / Ketone: Negative  / Bili: Negative / Urobili: Negative   Blood: x / Protein: Negative / Nitrite: Negative   Leuk Esterase: Moderate / RBC: 19 /hpf / WBC 3 /HPF   Sq Epi: x / Non Sq Epi: 1 /hpf / Bacteria: Negative        MICROBIOLOGY:  v  .Blood Blood-Peripheral  21   No growth to date.  --  --      .Blood Blood-Peripheral  21   No growth to date.  --  --                RADIOLOGY:    <The imaging below has been reviewed and visualized by me independently. Findings as detailed in report below>    EXAM:  MR FOOT WAW IC LT                        PROCEDURE DATE:  2021    Cutaneous ulceration along the medial aspect of the first MPJ with adjacent signal alteration, consistent with osteomyelitis, in the medial aspect of the first metatarsal head, the tibial sided sesamoid, and the medial aspect of the base of the first proximal phalanx    EXAM:  MR FOOT WAW IC RT                        PROCEDURE DATE:  2021    1. Changes concerning for osteomyelitis of the fifth toe.    EXAM:  MR BRAIN WAW IC                        PROCEDURE DATE:  2021    Similar right lateral convexity subdural collection measures 1.1 cm in greatest depth. The collection peripherally enhances. Minimal restricted diffusion within the posterior aspect of the collection. Findings could represent the presence of an empyema.  No abnormal parenchymal or leptomeningeal enhancement.  No acute parenchymal hemorrhage or acute infarction.  Similar minimal leftward midline shift. No effacement of basal cisterns. Similar mild ventricular dilatation.

## 2021-02-25 NOTE — DISCHARGE NOTE PROVIDER - CARE PROVIDER_API CALL
Javier Bravo)  Internal Medicine  [address] [place] [address]  Phone: [phone]  Fax: [phone]  Follow Up Time: 2 weeks

## 2021-02-25 NOTE — DISCHARGE NOTE PROVIDER - HOSPITAL COURSE
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 Pt is reportedly AAOx2 and interactive at baseline, but is now lethargic, minimally conversant, and only following basic commands. Pt currently resides at [facility] ([phone]). where another family member works at the facility and is able to check in on her. Since her cognitive decline she has progressively lost her mobility and spends the majority of time in bed at present. Pt has since developed multiple ulcerated lesions of the foot, including a L foot lesion that is reported to be getting larger, warm, and red. Pt awoke this morning with acutely altered mental status and was send in by facility to [facility] ED for concerns of sepsis vs. neurologic dysfunction. Per EMS was hypotensive to 90s systolic.    Pt was given vanc/zosyn in ED as part of empiric abx coverage for presumed sepsis; Collateral subsequently indicated pt is allergic to penicillins (rash) and was then received benadryl  to avoid any reactions. No rashes or ill effects from the administrated zosyn were observed by ED or primary team; family (pt's daughter/HCP) was notified of the inadvertent administration of zosyn to pt w/PCN allergy. Allergy subsequently documented and added to pt chart. Pt admitted to medicine service where she was transitioned to vanc/cefepime. Positive exposed bone on the L 1st MCP noted; CXR and subsequent MRI c/f OM. MRI also c/f OM in the 5th R metatarsal. CTH negative for acute pathology; redemonstrated previously known R SDH. f/u MRI head w/findings c/f possible empyema within the chronic SDH. Neurology, ID, and podiatry intimately involved in management. Pt also found to have hypernatremia on admission, but afebrile, normotensive, and w/o leukocytosis or leukopenia.    Pt rapidly improved to baseline w/abx and IVF therapy. Hypernatremia corrected to WNL. Discussions w/family and providers revolved around utility of interveining on possible OM of foot and possible empyema of known SDH in a elderly woman w/advanced dementia and GOC of DNR/DNI already previously established. Decision to forgo any management of the possible empyema of the SDH and foot lesions was based upon family preference, absence of clinical signs of infection, difficult/invasive nature of possible interventions, and pt's baseline functional status. Cultures were pan-negative.    Pt returned to baseline and medically optimized to be d/c'ed back to NH facility as requested by pt family.              You came to the hospital with altered mental status. Our emergency department and internal medicine physicians investigated for causes of your issues and found you to have several potential causes/contributing factors to your cognitive changes including elevated sodium in your blood (hypernatremia), possible infection, and a chronic subdural hematoma. We performed additional tests/imaging studies and believe that you were dehydrated and likely have infections in the bones of your feet. We provided IV antibiotics, fluids, and supportive care to treat these potential underlying causes of your altered mental status and you showed significant improvement with rapid return to your baseline cognitive status. We also discussed further management options and the utility of additional interventions with your family and ultimately decided to forgo any invasive diagnostic or therapeutic forms of management. As you did not demonstrate any signs/symptoms of infection following your return to baseline mental status, we opted to monitor you off antibiotics and did not witness any additional cognitive decline or clinical signs of infection. The decision has therefore been made to discharge you from the hospital without any additional antibiotic therapy as agreed upon by your family/healthcare proxies.    PLEASE RETURN TO THE EMERGENCY DEPARTMENT IMMEDIATELY if you have signs or symptoms of infection, electrolyte imbalance, or seizure including, but not limited to: fever, lethargy, extreme fatigue, blurry vision, abdominal pain, low blood pressure, seizures, altered mental status, difficulties with balance, feeling of worsening/severe illness, nausea/vomiting, or any other symptom you feel needs immediate evaluation by a medical professional.       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 Pt is reportedly AAOx2 and interactive at baseline, but is now lethargic, minimally conversant, and only following basic commands. Pt currently resides at [facility] ([phone]). where another family member works at the facility and is able to check in on her. Since her cognitive decline she has progressively lost her mobility and spends the majority of time in bed at present. Pt has since developed multiple ulcerated lesions of the foot, including a L foot lesion that is reported to be getting larger, warm, and red. Pt awoke this morning with acutely altered mental status and was send in by facility to [facility] ED for concerns of sepsis vs. neurologic dysfunction. Per EMS was hypotensive to 90s systolic.    Pt was given vanc/zosyn in ED as part of empiric abx coverage for presumed sepsis; Collateral subsequently indicated pt is allergic to penicillins (rash) and was then received benadryl  to avoid any reactions. No rashes or ill effects from the administrated zosyn were observed by ED or primary team; family (pt's daughter/HCP) was notified of the inadvertent administration of zosyn to pt w/PCN allergy. Allergy subsequently documented and added to pt chart. Pt admitted to medicine service where she was transitioned to vanc/cefepime. Positive exposed bone on the L 1st MCP noted; CXR and subsequent MRI c/f OM. MRI also c/f OM in the 5th R metatarsal. CTH negative for acute pathology; redemonstrated previously known R SDH. f/u MRI head w/findings c/f possible empyema within the chronic SDH. Neurology, ID, and podiatry intimately involved in management. Pt also found to have hypernatremia on admission, but afebrile, normotensive, and w/o leukocytosis or leukopenia.    Pt rapidly improved to baseline w/abx and IVF therapy. Hypernatremia corrected to WNL. Discussions w/family and providers revolved around utility of interveining on possible OM of foot and possible empyema of known SDH in a elderly woman w/advanced dementia and GOC of DNR/DNI already previously established. Decision to forgo any management of the possible empyema of the SDH and foot lesions was based upon family preference, absence of clinical signs of infection, difficult/invasive nature of possible interventions, and pt's baseline functional status. Cultures were pan-negative.    Pt returned to baseline and medically optimized to be d/c'ed back to NH facility as requested by pt family. 45 minutes spent discharging the patient.               You came to the hospital with altered mental status. Our emergency department and internal medicine physicians investigated for causes of your issues and found you to have several potential causes/contributing factors to your cognitive changes including elevated sodium in your blood (hypernatremia), possible infection, and a chronic subdural hematoma. We performed additional tests/imaging studies and believe that you were dehydrated and likely have infections in the bones of your feet. We provided IV antibiotics, fluids, and supportive care to treat these potential underlying causes of your altered mental status and you showed significant improvement with rapid return to your baseline cognitive status. We also discussed further management options and the utility of additional interventions with your family and ultimately decided to forgo any invasive diagnostic or therapeutic forms of management. As you did not demonstrate any signs/symptoms of infection following your return to baseline mental status, we opted to monitor you off antibiotics and did not witness any additional cognitive decline or clinical signs of infection. The decision has therefore been made to discharge you from the hospital without any additional antibiotic therapy as agreed upon by your family/healthcare proxies.    PLEASE RETURN TO THE EMERGENCY DEPARTMENT IMMEDIATELY if you have signs or symptoms of infection, electrolyte imbalance, or seizure including, but not limited to: fever, lethargy, extreme fatigue, blurry vision, abdominal pain, low blood pressure, seizures, altered mental status, difficulties with balance, feeling of worsening/severe illness, nausea/vomiting, or any other symptom you feel needs immediate evaluation by a medical professional.

## 2021-02-25 NOTE — PROGRESS NOTE ADULT - SUBJECTIVE AND OBJECTIVE BOX
PROGRESS NOTE:     CONTACT INFO:  Alexander Taylor MD (Resident Physician - PGY-1 - Internal Medicine)  [email or web address]  Pager: [phone] (any site) or [phone] ([facility] only)    Patient is a 89y old  Female who presents with a chief complaint of AMS (2021 08:33)      SUBJECTIVE / OVERNIGHT EVENTS:  No acute events overnight. MRI of foot consistent w/osteo in region of known exposed bone. Podiatry on-board; current discussion regarding utility/pt benefit of intervention given absence of leukocytosis and other signs of sepis; high probability that OM of the foot is chronic and pt may not benefit from intervention or even long term antibiotics especially given clinical status. Patient seen and evaluated at bedside. AAOx1 today; ROS largely unreliable given pt is confused and frequently responds w/statements and information that are irrelevant or inconsistent with ongoing conversation. Overall ROS includes pt declining any active complain other than some foot/leg pain. No fever/chills. Denies SOB at rest, chest pain, palpitations, abdominal pain, nausea/vomiting    ADDITIONAL REVIEW OF SYSTEMS:    MEDICATIONS  (STANDING):  collagenase Ointment 1 Application(s) Topical daily  levETIRAcetam 750 milliGRAM(s) Oral two times a day  metoprolol tartrate 25 milliGRAM(s) Oral every 12 hours  mupirocin 2% Ointment 1 Application(s) Topical every 12 hours  phenytoin   Suspension 100 milliGRAM(s) Oral three times a day  senna 2 Tablet(s) Oral at bedtime    MEDICATIONS  (PRN):  diphenhydrAMINE   Injectable 25 milliGRAM(s) IV Push once PRN Rash      CAPILLARY BLOOD GLUCOSE        I&O's Summary    2021 07:01  -  2021 07:00  --------------------------------------------------------  IN: 670 mL / OUT: 750 mL / NET: -80 mL    2021 07:01  -  2021 11:32  --------------------------------------------------------  IN: 200 mL / OUT: 0 mL / NET: 200 mL        PHYSICAL EXAM:  Vital Signs Last 24 Hrs  T(C): 37 (2021 04:24), Max: 37.1 (2021 11:51)  T(F): 98.6 (2021 04:24), Max: 98.7 (2021 11:51)  HR: 71 (2021 04:24) (69 - 73)  BP: 133/81 (2021 04:24) (133/81 - 139/79)  BP(mean): --  RR: 18 (2021 04:24) (18 - 18)  SpO2: 99% (2021 04:24) (97% - 99%)    Gen: Aseptic appearing. Conversant, but confused and difficult to redirect to medical conversation. Following basic commands (squeeze my hand, open your eyes); significant cachexia  Head: NCAT  HEENT: PERRL, Oral mucosa is moderately dry, but w/interval improvement; normal conjunctiva, anicteric, neck supple  Lung: CTAB no wheezing/rales/rhonchi  CV: Normal rate w/irregularly irregular rhythm; No MRG	  Abd: soft, NTND, no rebound or guarding, +BSx4  MSK: No edema, no visible deformities  Neuro: AAOx1 (able to provide name). Moving all extremity grossly, but poor movement of L arm. L arm mildly shabana. Following basic commands. Conversant, but confused. Unaware of presence in the hospital and asking where we are going  Skin: 2x2cm stage 2 ulcer of the medial aspect of the L great toe w/surrounding erythema and warmth w/exposed bone; covered and dressing is CDI. 2mm stage 2 chronic ulcer to base of R 5th toe; covered and dressing is CDI    LABS:                        13.3   4.74  )-----------( 163      ( 2021 06:55 )             41.6     02    141  |  106  |  10  ----------------------------<  119<H>  4.2   |  27  |  0.45<L>    Ca    8.7      2021 06:55  Phos  3.2       Mg     2.3         TPro  6.0  /  Alb  2.9<L>  /  TBili  0.3  /  DBili  x   /  AST  18  /  ALT  18  /  AlkPhos  184<H>            Urinalysis Basic - ( 2021 07:55 )    Color: Light Yellow / Appearance: Slightly Turbid / S.018 / pH: x  Gluc: x / Ketone: Negative  / Bili: Negative / Urobili: Negative   Blood: x / Protein: Negative / Nitrite: Negative   Leuk Esterase: Moderate / RBC: 19 /hpf / WBC 3 /HPF   Sq Epi: x / Non Sq Epi: 1 /hpf / Bacteria: Negative        Culture - Blood (collected 2021 01:44)  Source: .Blood Blood-Peripheral  Preliminary Report (2021 02:02):    No growth to date.    Culture - Blood (collected 2021 01:42)  Source: .Blood Blood-Peripheral  Preliminary Report (2021 02:02):    No growth to date.        RADIOLOGY & ADDITIONAL TESTS:  Results Reviewed: MRI foot  Imaging Personally Reviewed: MRI foot    < from: MR Foot w/wo IV Cont, Right (21 @ 22:08) >    EXAM:  MR FOOT WAW IC RT                            PROCEDURE DATE:  2021            INTERPRETATION:  MR FOOT WITHOUT AND WITH IV CONTRAST RIGHT    HISTORY: Right forefoot soft tissue wounds. Osteomyelitis. Pain. Infection.    TECHNIQUE:  Multiplanar MRI of the right forefoot was performed without and with 5 cc of Gadavist intravenous gadolinium contrast using 13 sequences. Patient motion causes degradation of several sequences.    COMPARISON:  Bilateral foot x-rays dated 2021.    FINDINGS:    OSSEOUS STRUCTURES    Fractures:  None.    Marrow:  Marrow edema and T1 marrow replacement involves the phalanges of the fifth toe. Changes are greatest around the fifth interphalangeal joints. Postcontrast enhancement is present. Dorsally adjacent soft tissue wound is suggested at the level of the fifth proximal interphalangeal joint.    INTERPHALANGEAL JOINTS    Articular Surfaces:  Preserved.    Effusions/Synovitis:  None.    1ST METATARSOPHALANGEAL JOINT    Articular Surfaces:  Slight subchondral reactive changes are noted without significant cartilage loss.    Effusions/Synovitis:  None.    Sesamoids:  Intact.    LESSER METATARSOPHALANGEAL JOINTS    Articular Surfaces:  Preserved.    Effusions/Synovitis:  None.    TARSOMETATARSAL JOINTS    Articular Surfaces:  Scattered tiny osteophytes and tiny subchondral cysts are noted.    Effusions/Synovitis:  None.    INTERTARSAL JOINTS    Articular Surfaces:  Slight scattered subchondral reactive changes are noted.    Effusions/Synovitis:  None.    INTERMETATARSAL SPACES    Neuromas:  None.    Bursa:  Slight first webspace bursa is noted.    LISFRANC LIGAMENT  Intact.    TENDONS    Flexor Tendons:  Intact.    Extensor Tendons:  Intact.    DISTAL PLANTAR FASCIA  Intact.    SOFT TISSUES    Musculature:  Generalized increased T2 signal and muscle atrophy is consistent with diabetic neuropathy.    Subcutaneous Tissues:  Mild edema is present.    IMPRESSION:  1. Changes concerning for osteomyelitis of the fifth toe.                GABRIEL DODD MD; Attending Radiologist  This document has been electronically signed. 2021  8:38AM    < end of copied text >  < from: MR Foot w/wo IV Cont, Left (21 @ 22:07) >    EXAM:  MR FOOT WAW IC LT                            PROCEDURE DATE:  2021            INTERPRETATION:  EXAMINATION: MRI of the left forefoot with and without contrast    CLINICAL INFORMATION: Bilateral foot ulcers. Evaluate for osteomyelitis.    TECHNIQUE: Multiplanar, multisequential MR imaging was performed. This includes T1-weighted images after the administration of 5 mL of intravenous Gadavist. 2.5 mL were discarded.    FINDINGS: Several the series are limited by motion artifact. There is cutaneous ulceration along the medial aspect of the first metatarsophalangeal joint which appears to extend down to the level of the joint. There is high T2 and low T1 marrow signal in the adjacent medial aspect of the first metatarsal head, the tibial sided sesamoid, and medial aspect of the base of the first proximal phalanx, suspicious for osteomyelitis. There is associated marrow enhancement and surrounding soft tissue enhancement.    There are no other areas of marrow signal alteration that are suspicious for osteomyelitis. There are no joint effusions. There were no advanced arthritic changes. There is diffuse fatty atrophy and high T2 signal in the musculature, consistent with denervation.    IMPRESSION: Cutaneous ulceration along the medial aspect of the first MPJ with adjacent signal alteration, consistent with osteomyelitis, in the medial aspect of the first metatarsal head, the tibial sided sesamoid, and the medial aspect of the base of the first proximal phalanx                DENTON PEREZ MD; Attending Radiologist  This document has been electronically signed. 2021  9:23AM    < end of copied text >      COORDINATION OF CARE:  Care Discussed with Consultants/Other Providers [Y/N]:  Prior or Outpatient Records Reviewed [Y/N]:

## 2021-02-25 NOTE — DISCHARGE NOTE PROVIDER - NSDCCPCAREPLAN_GEN_ALL_CORE_FT
PRINCIPAL DISCHARGE DIAGNOSIS  Diagnosis: AMS (altered mental status)  Assessment and Plan of Treatment: You came to the hospital with altered mental status. Our emergency department and internal medicine physicians investigated for causes of your issues and found you to have several potential causes/contributing factors to your cognitive changes including elevated sodium in your blood (hypernatremia), possible infection, and a chronic subdural hematoma. We performed additional tests/imaging studies and believe that you were dehydrated and likely have infections in the bones of your feet. We provided IV antibiotics, fluids, and supportive care to treat these potential underlying causes of your altered mental status and you showed significant improvement with rapid return to your baseline cognitive status. We also discussed further management options and the utility of additional interventions with your family and ultimately decided to forgo any invasive diagnostic or therapeutic forms of management. As you did not demonstrate any signs/symptoms of infection following your return to baseline mental status, we opted to monitor you off antibiotics and did not witness any additional cognitive decline or clinical signs of infection. The decision has therefore been made to discharge you from the hospital without any additional antibiotic therapy as agreed upon by your family/healthcare proxies.  PLEASE RETURN TO THE EMERGENCY DEPARTMENT IMMEDIATELY if you have signs or symptoms of infection, electrolyte imbalance, or seizure including, but not limited to: fever, lethargy, extreme fatigue, blurry vision, abdominal pain, low blood pressure, seizures, altered mental status, difficulties with balance, feeling of worsening/severe illness, nausea/vomiting, or any other symptom you feel needs immediate evaluation by a medical professional.      SECONDARY DISCHARGE DIAGNOSES  Diagnosis: Osteomyelitis  Assessment and Plan of Treatment: During your hospitalization our medical team investigated for possible infectious source of your altered mental status. We determined that you have an infection of bones in your 1st digit of your left foot and the 5th digit of you right foot. These infections likely arose secondary to open sores of the skin with subsequent bacterial invasion by organisms that typically reside on the skin (skin bacterial sung). The bone infection, known as osteomyelitis, was suspected on the basis of an x-ray and subsequently confirmed by an MRI performed during your admission. We provided you broad spectrum antibiotics to treat the infection and consulted our Infectious Disease specialists for further input on ideal antimicrobial coverage. Osteomyelitis can be difficult to treat and the chronicity of your infection is unknown. As you did not exhibit any active signs of infection (no fever or elevations of white blood cell counts), a decision has been made to monitor you off antibiotics. During our monitoring you did not demonstrate any new signs of infection, therefore you will leave the hospital without any additional antibiotic therapy at this time.   RETURN TO THE EMERGENCY DEPARTMENT IMMEDIATELY IF you have signs or symptoms of worsening osteomyelitis or progression to blood borne bacterial infection (sepsis) including, but not limited to: infection, fever, abnormal white blood cell counts, low blood pressure, confusion, altered mental status, difficulties with balance, feeling of worsening/severe illness, nausea/vomiting, severe diarrhea, pus or other discharge from your wound, or any other symptom you feel needs immediate evaluation by a medical professional.

## 2021-02-25 NOTE — PROGRESS NOTE ADULT - PROBLEM SELECTOR PLAN 1
DDx metabolic vs. septic vs. neurological  -Full septic w/u including BCx, CXR, UA, UCx, VBG w/lactate  CXR and UA unremarkable  VBG w/mild respiratory acidosis; no gap  Normal lactate    -Metabolic evaluation including CMP, CBC, VBG w/lactate  Hypernatremia; resolved o/n on 1st day of admission w/D5w therapy    -Neurological w/u including CTH non-con; consider neuro c/s [place]  chronic subdural on the R; no acute neurologic findings  Brain MRI findings c/f empyema within chronic SDH  ID c/s called; f/u recs    -Evaluation of the foot wound by podiatry and imaging; ESR/CRP  ESR/CRP elevated; XR w/cortical bone destruction c/f osteo; podiatry recommends   MRI ordered for foot; f/u results    -s/p vanc/zosyn in ED; now transitioned to vanc/cefepime  c/w vanc/cefepime; increase cefepime to 2g q12h given MRI findings c/f empyema

## 2021-02-25 NOTE — PROGRESS NOTE ADULT - ASSESSMENT
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 In the ED afebrile, normotensive and not tachycardic. On presentation WBC of 5.56 with 55.4% PMN.     ESR (2/23) of 55.   CRP (2/22) of 1.65.   COVID19 PCR (2/22) Negative. COVID19 Antibody (2/23) Negative.   Blood Cultures (2/23) NGTD.     MRI Head (2/23) with Similar right lateral convexity subdural collection measures 1.1 cm in greatest depth. The collection peripherally enhances.    I discussed MRI Head findings with neuroradiology (2/24) - less concern for abscess - appears more consistent with chronic subdural hematoma. Patient also without leukocytosis or fever. Doubt subdural abscess. I also discussed with Grandson Dr Andre Reyes - for her previous subdural hematomas patient did not undergo rachel hole or any surgical intervention at [place] - was management conservatively (makes superinfection less likely)    I called podiatry service today (2/25) on their initial evaluation there was no concern for soft tissue infection / cellulitis on presentation. There was no drainage or malodor to the wounds. On my initial evaluation on 2/24 there was likewise no concern for soft tissue infection.     MRI RLE (2/24) with concern for 5th digit OM  MRI LLE (2/24) with concern for 1st digit OM     I suspect (based on the dry eschar at surface of wounds and no evidence of cellulitis/present superinfection) that the radiographic findings are representative of chronic osteomyelitis. Since there is no component of superinfection no indication for antibiotics at this time.     There is risk for prospective superinfection of the ulcers. Will require aggressive wound care for these ulcerations after discharge.     RECOMMENDATIONS:    #LLE Wound, Abnormal MRI L and R (likely chronic OM)  --No obvious cellulitis at this time; favor monitoring off of antibiotics.   --Continue to follow CBC with diff  --Continue to follow temperature curve  --Follow up on preliminary blood cultures    #Encephalopathy, Abnormal Brain MRI  --Doubt Empyema (see above) - I would not utilize antibiotics for this at this time  --Continue to monitor mental status and cultures    I will follow the patient as needed. Please feel free to contact me with any further questions or concerns.    Javier Bravo M.D.  [facility] Division of Infectious Disease  8AM-5PM: Pager Number [phone]  After Hours (or if no response): Please contact the Infectious Diseases Office at [phone]     The above assessment and plan were discussed with medicine team, Podiatry team and patient's Grandson Dr. Andre Reyes  89F w/PMHx of dementia, HTN, DM2, afib c/b CVA, and traumatic bilateral SDH s/p fall in 9/2020 c/b seizures who is BIBEMS from nursing home for 1 day of AMS. In the ED afebrile, normotensive and not tachycardic. On presentation WBC of 5.56 with 55.4% PMN.     ESR (2/23) of 55.   CRP (2/22) of 1.65.   COVID19 PCR (2/22) Negative. COVID19 Antibody (2/23) Negative.   Blood Cultures (2/23) NGTD.     MRI Head (2/23) with Similar right lateral convexity subdural collection measures 1.1 cm in greatest depth. The collection peripherally enhances.    I discussed MRI Head findings with neuroradiology (2/24) - less concern for abscess - appears more consistent with chronic subdural hematoma. Patient also without leukocytosis or fever. Doubt subdural abscess. I also discussed with Grandson Dr Andre Reyes - for her previous subdural hematomas patient did not undergo rachel hole or any surgical intervention at [place] - was management conservatively (makes superinfection less likely)    I called podiatry service today (2/25) on their initial evaluation there was no concern for soft tissue infection / cellulitis on presentation. There was no drainage or malodor to the wounds. On my initial evaluation on 2/24 there was likewise no concern for soft tissue infection.     MRI RLE (2/24) with concern for 5th digit OM  MRI LLE (2/24) with concern for 1st digit OM     I suspect (based on the dry eschar at surface of wounds and no evidence of cellulitis/present superinfection) that the radiographic findings are representative of chronic osteomyelitis. Since there is no component of superinfection no indication for antibiotics at this time.     I agree that patient would not be a good surgical candidate due to concerns about wound healing and given patient's overall neurologic status (advanced dementia)    There is risk for prospective superinfection of the ulcers. Will require aggressive wound care for these ulcerations after discharge.     RECOMMENDATIONS:    #LLE Wound, Abnormal MRI L and R (likely chronic OM)  --No obvious cellulitis at this time; favor monitoring off of antibiotics.   --Continue to follow CBC with diff  --Continue to follow temperature curve  --Follow up on preliminary blood cultures    #Encephalopathy, Abnormal Brain MRI  --Doubt Empyema (see above) - I would not utilize antibiotics for this at this time  --Continue to monitor mental status and cultures    I will follow the patient as needed. Please feel free to contact me with any further questions or concerns.    Javier Bravo M.D.  [facility] Division of Infectious Disease  8AM-5PM: Pager Number [phone]  After Hours (or if no response): Please contact the Infectious Diseases Office at [phone]     The above assessment and plan were discussed with medicine team, Podiatry team and patient's Grandson Dr. Andre Reyes  89F w/PMHx of dementia, HTN, DM2, afib c/b CVA, and traumatic bilateral SDH s/p fall in 9/2020 c/b seizures who is BIBEMS from nursing home for 1 day of AMS. In the ED afebrile, normotensive and not tachycardic. On presentation WBC of 5.56 with 55.4% PMN.     ESR (2/23) of 55.   CRP (2/22) of 1.65.   COVID19 PCR (2/22) Negative. COVID19 Antibody (2/23) Negative.   Blood Cultures (2/23) NGTD.     MRI Head (2/23) with Similar right lateral convexity subdural collection measures 1.1 cm in greatest depth. The collection peripherally enhances.    I discussed MRI Head findings with neuroradiology (2/24) - less concern for abscess - appears more consistent with chronic subdural hematoma. Patient also without leukocytosis or fever. Doubt subdural abscess. I also discussed with Grandson Dr Andre Reyes - for her previous subdural hematomas patient did not undergo rachel hole or any surgical intervention at [place] - was management conservatively (makes superinfection less likely)    MRI RLE (2/24) with concern for 5th digit OM  MRI LLE (2/24) with concern for 1st digit OM     I called podiatry service today (2/25) on their initial evaluation there was no concern for soft tissue infection / cellulitis on presentation. There was no drainage or malodor to the wounds. On my initial evaluation on 2/24 there was likewise no concern for soft tissue infection.     I suspect (based on the dry eschar at surface of wounds and no evidence of cellulitis/present superinfection) that the radiographic findings are representative of chronic osteomyelitis. Since there is no component of superinfection no indication for antibiotics at this time.     I agree that patient would not be a good surgical candidate due to concerns about wound healing and given patient's overall neurologic status (advanced dementia)    There is risk for prospective superinfection of the ulcers. Will require aggressive wound care for these ulcerations after discharge.     RECOMMENDATIONS:    #LLE Wound, Abnormal MRI L and R (likely chronic OM)  --No obvious cellulitis at this time; favor monitoring off of antibiotics.   --Continue to follow CBC with diff  --Continue to follow temperature curve  --Follow up on preliminary blood cultures    #Encephalopathy, Abnormal Brain MRI  --Doubt Empyema (see above) - I would not utilize antibiotics for this at this time  --Continue to monitor mental status and cultures    I will follow the patient as needed. Please feel free to contact me with any further questions or concerns.    Javier Bravo M.D.  [facility] Division of Infectious Disease  8AM-5PM: Pager Number [phone]  After Hours (or if no response): Please contact the Infectious Diseases Office at [phone]     The above assessment and plan were discussed with medicine team, Podiatry team and patient's Grandson Dr. Andre Reyes

## 2021-02-25 NOTE — PROGRESS NOTE ADULT - PROBLEM SELECTOR PLAN 3
2/2 fall while on eliquis in 9/2020  -Hold eliquis   -CTH  Chronic L subdural on CTH w/o midline shift; MR c/f empyema within SDH  -ID c/s placed; will f/u recs prior to involving other services  -Consider neurology vs. neurosurgery c/s PRN  -cefepime to 2g q12h given MRI findings c/f empyema

## 2021-02-25 NOTE — PROGRESS NOTE ADULT - SUBJECTIVE AND OBJECTIVE BOX
Podiatry pager #: [phone]/ [phone]    Patient is a 89y old  Female who presents with a chief complaint of AMS (2021 07:14) Podiatry seen for f/u b/l foot wounds       INTERVAL HPI/OVERNIGHT EVENTS:  Patient seen and evaluated at bedside.  Pt is resting comfortable in NAD. Denies N/V/F/C.  Pain rated at X/10    Allergies    penicillin (Rash)    Intolerances        Vital Signs Last 24 Hrs  T(C): 37 (2021 04:24), Max: 37.1 (2021 11:51)  T(F): 98.6 (2021 04:24), Max: 98.7 (2021 11:51)  HR: 71 (2021 04:24) (69 - 73)  BP: 133/81 (2021 04:24) (133/81 - 139/79)  BP(mean): --  RR: 18 (2021 04:24) (18 - 18)  SpO2: 99% (2021 04:24) (97% - 99%)    collagenase Ointment 1 Application(s) Topical daily  diphenhydrAMINE   Injectable 25 milliGRAM(s) IV Push once PRN  levETIRAcetam 750 milliGRAM(s) Oral two times a day  metoprolol tartrate 25 milliGRAM(s) Oral every 12 hours  mupirocin 2% Ointment 1 Application(s) Topical every 12 hours  phenytoin   Suspension 100 milliGRAM(s) Oral three times a day  senna 2 Tablet(s) Oral at bedtime      LABS:                        13.3   4.74  )-----------( 163      ( 2021 06:55 )             41.6     02-25    141  |  106  |  10  ----------------------------<  119<H>  4.2   |  27  |  0.45<L>    Ca    8.7      2021 06:55  Phos  3.2     02-25  Mg     2.3     02-25    TPro  6.0  /  Alb  2.9<L>  /  TBili  0.3  /  DBili  x   /  AST  18  /  ALT  18  /  AlkPhos  184<H>        Urinalysis Basic - ( 2021 07:55 )    Color: Light Yellow / Appearance: Slightly Turbid / S.018 / pH: x  Gluc: x / Ketone: Negative  / Bili: Negative / Urobili: Negative   Blood: x / Protein: Negative / Nitrite: Negative   Leuk Esterase: Moderate / RBC: x / WBC x   Sq Epi: x / Non Sq Epi: x / Bacteria: x      CAPILLARY BLOOD GLUCOSE          Lower Extremity Physical Exam:  Vascular: DP 1/4 bilateral, PT not palpable, B/L, Temperature gradient WNL, B/L.   Neuro: Epicritic sensation reduced to the level of digits, B/L.  Musculoskeletal/Ortho: prone to contracture of LE, unremarkable otherwise    Right foot dorsal fifth digit wound at the PIPJ to bone w/ fibrogranular wound bed, no periwound erythema, no malodor, no local signs of infection.    Left foot medial first MPJ wound to bone w/ fibrotic wound base, w/ periwound erythema 1cm beyond the wound edges. There is no active drainage, or purulence noted.     RADIOLOGY & ADDITIONAL TESTS:  < from: Xray Foot AP + Lateral + Oblique, Bilat (21 @ 17:49) >    EXAM:  FOOT COMPLETE BILAT-MIN 3 VIEWS                            PROCEDURE DATE:  2021            INTERPRETATION:  CLINICAL INDICATION: Foot ulcer.    TECHNIQUE: 3 views of the bilateral feet.    COMPARISON: No similar prior studies available for comparison.    IMPRESSION:    Soft tissue ulceration along the medial aspect of the first metatarsophalangeal joint on the left. Mild cortical irregularity of the adjacent medial aspect of the first metatarsal head may be degenerative/enthesopathic change or secondary to osteomyelitis. Correlate with pending MRI.    Otherwise, no radiographic evidence of osteomyelitis. No acute fractures or dislocations are demonstrated. 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 Bilateral calcaneal enthesophytes. Vascular calcifications.                FADI PARKER MD; Resident Radiology  This document has been electronically signed.  DENTON PEREZ MD; Attending Radiologist  This document has been electronically signed. 2021 10:57AM

## 2021-02-25 NOTE — PROGRESS NOTE ADULT - PROBLEM SELECTOR PLAN 2
2x2cm stage 2 ulcer of the medial aspect of the L great toe w/surrounding erythema and warmth; possible exposed bone  -Podiatry c/s placed; recs appreciated  -Wound care c/s placed  -s/p vanc/zosyn in ED; now transitioned to vanc/cefepime  ESR/CRP elevated; XR c/f osteo; MRI ordered 2x2cm stage 2 ulcer of the medial aspect of the L great toe w/surrounding erythema and warmth; possible exposed bone  -Podiatry c/s placed; recs appreciated  -Wound care c/s placed  -s/p vanc/zosyn in ED; now transitioned to vanc/cefepime  ESR/CRP elevated; XR c/f osteo; MRI c/f osteo of 1st L and 5th R toes

## 2021-02-25 NOTE — PROGRESS NOTE ADULT - ASSESSMENT
· Assessment	  Pt is 89yo who presents w/ bilateral foot wounds  -Pt was seen and examined  -afebrile, no leukocytosis   -Right foot dorsal fifth digit wound at the PIPJ to bone w/ fibrogranular wound bed, no periwound erythema, no malodor, no local signs of infection.  -Left foot medial first MPJ wound to bone w/ fibrotic wound base, w/ periwound erythema 1cm beyond the wound edges. There is no active drainage, or purulence noted.   -RFXR: demonstrating  Mild cortical irregularity of the adjacent medial aspect of the first metatarsal head may be degenerative/enthesopathic change or secondary to osteomyelitis.  - Pod plan pending MRI results

## 2021-02-25 NOTE — DISCHARGE NOTE PROVIDER - NSDCFUADDINST_GEN_ALL_CORE_FT
Podiatry Discharge Instructions:  Follow up: Please follow up with Dr. Bell within 1 week of discharge from the hospital, please call [phone] for appointment and discuss that you recently were seen in the hospital.  Wound Care: Please apply santyl to R foot 5th digit wound and L foot 1st MPJ wound daily, then dress w/ 4x4 gauze and light edita.   Weight bearing: Please weight bear as tolerated in a surgical shoe.  Antibiotics: Please continue as instructed.

## 2021-02-25 NOTE — PROGRESS NOTE ADULT - ATTENDING COMMENTS
given to family
Encephalopathy- MRI/EEG pending- cont seizure prophylaxis   Foot ulcerations r/o osteo- LE MRI pending- on Vanco/Cefepime  Hypernatremia- improved with IV fluids
Encephalopathy- improved  MRI head revealed peripherally enhancing subdural collection which may represent empyema  ID eval placed  Increase cefepime to 2 g IVSS q 12, cont Vancomycin  Patient would likely be a poor operative candidate  Cont seizure prophylaxis   Foot ulcerations r/o osteo- LE MRI pending - on abxs above
Encephalopathy- improved  MRI head revealed peripherally enhancing subdural collection that was concerning for empyema- ID eval appreciated, upon further review with neuroradiology less concern for abscess - appears more consistent with chronic subdural hematoma  Currently observing off abx  LE MRI concerning for OM however patient without fever or white count   D/w family- pending ID f/u more likely to opt for conservative management  D/C plan back to [facility]

## 2021-02-25 NOTE — PROGRESS NOTE ADULT - ASSESSMENT
89F w/PMHx of dementia, HTN, DM2, afib c/b CVA, and recent traumatic bilateral SDH s/p fall in 9/2020 c/b seizures who is BIBEMS from nursing home for 1 day of AMS. Pt is reportedly AAOx2 and interactive at baseline, but is now lethargic, minimally conversant, and only following basic commands. Multiple possible etiologies for AMS including sepsis ISO multiple open sores, active seizure, recurring ICH, metabolic disturbance. Now w/MRI finding showing rim enhancing lesion in previously demonstrated SDH that is c/f possible infection 89F w/PMHx of dementia, HTN, DM2, afib c/b CVA, and recent traumatic bilateral SDH s/p fall in 9/2020 c/b seizures who is BIBEMS from nursing home for 1 day of AMS. Pt is reportedly AAOx2 and interactive at baseline, but is now lethargic, minimally conversant, and only following basic commands. Multiple possible etiologies for AMS including sepsis ISO multiple open sores, active seizure, recurring ICH, metabolic disturbance. Now w/MRI finding showing rim enhancing lesion in previously demonstrated SDH that is c/f possible infection. MRI w/concerns for OM of L 1st and R 5th toe. Ongoing discussion regarding utility and pt benefit of intervention on the feet or SDH. Podiatry on-board; current discussion regarding utility/pt benefit of intervention given absence of leukocytosis and other signs of sepis; high probability that OM of the foot is chronic and pt may not benefit from intervention or even long term antibiotics especially given clinical status. Patient seen and evaluated at bedside.

## 2021-02-25 NOTE — PROGRESS NOTE ADULT - PROBLEM SELECTOR PLAN 4
Hx of seizure following SDH  -continue home seizure meds  -Transitioned to PO meds again  -Keppra 750 reinstated (home dose)

## 2021-02-26 NOTE — ED POST DISCHARGE NOTE - RESULT SUMMARY
Spoke with Daughter (Simran) regarding abnormal echo findings (enlarged RV). She will f/u with repeat echo for patient this week.

## 2021-02-26 NOTE — PROGRESS NOTE ADULT - PROBLEM SELECTOR PLAN 2
2x2cm stage 2 ulcer of the medial aspect of the L great toe w/surrounding erythema and warmth; possible exposed bone  -Podiatry c/s placed; recs appreciated  -Wound care c/s placed  -s/p vanc/zosyn in ED; now transitioned to vanc/cefepime  ESR/CRP elevated; XR c/f osteo; MRI c/f osteo of 1st L and 5th R toes

## 2021-02-26 NOTE — DISCHARGE NOTE NURSING/CASE MANAGEMENT/SOCIAL WORK - PATIENT PORTAL LINK FT
You can access the FollowMyHealth Patient Portal offered by [facility] by registering at the following website: [email or web address]. By joining [employer]’s FollowMyHealth portal, you will also be able to view your health information using other applications (apps) compatible with our system.

## 2021-02-26 NOTE — PROGRESS NOTE ADULT - ASSESSMENT
89F w/PMHx of dementia, HTN, DM2, afib c/b CVA, and recent traumatic bilateral SDH s/p fall in 9/2020 c/b seizures who is BIBEMS from nursing home for 1 day of AMS. Pt is reportedly AAOx2 and interactive at baseline, but is now lethargic, minimally conversant, and only following basic commands. Multiple possible etiologies for AMS including sepsis ISO multiple open sores, active seizure, recurring ICH, metabolic disturbance. Now w/MRI finding showing rim enhancing lesion in previously demonstrated SDH that is c/f possible infection. MRI w/concerns for OM of L 1st and R 5th toe. Ongoing discussion regarding utility and pt benefit of intervention on the feet or SDH. Podiatry on-board; current discussion regarding utility/pt benefit of intervention given absence of leukocytosis and other signs of sepis; high probability that OM of the foot is chronic and pt may not benefit from intervention or even long term antibiotics especially given clinical status. Patient seen and evaluated at bedside.

## 2021-02-26 NOTE — PROGRESS NOTE ADULT - PROBLEM SELECTOR PLAN 1
DDx metabolic vs. septic vs. 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

## 2021-02-26 NOTE — PROGRESS NOTE ADULT - PROVIDER SPECIALTY LIST ADULT
Infectious Disease
Podiatry
Podiatry
Internal Medicine

## 2021-02-26 NOTE — PROGRESS NOTE ADULT - PROBLEM SELECTOR PLAN 5
Pt w/history of a. fib; on eliquis at outside facility, but endured traumatic SDH in 9/2020 c/b seizure  -Hold eliquis for now, consider d/c'ing indefinitely given pt is 89yo w/recent SDH c/b seizures  -c/w metoprolol for rate control
Pt w/history of a. fib; on eliquis at outside facility, but endured traumatic SDH in 9/2020 c/b seizure  -Hold eliquis for now, consider d/c'ing indefinitely given pt is 89yo w/recent SDH c/b seizures  -c/w metoprolol for rate control

## 2021-03-04 NOTE — CHART NOTE - NSCHARTNOTEFT_GEN_A_CORE
MR results discussed w/ ID. Given pt's age, med status, and stable wounds, currently no need for bone biopsy or amputation. Will be continuing with local wound care.
The patient presented with metabolic encephalopathy.
Received call from pharmacy staff Silviano regarding patient's abx dose. Per patient's age and body weight her creatinine clearance was calculated by pharmacy staff at 34.68 requring renally dosing her vancomycin and cefepime. Will change abx per pharmacy recommendations with vancomycin changed to q24 dosing and cefepime changed to 1g q12. Will sign out to day team.       Philippe Coronado M.D.  Internal Medicine PGY-1  [phone]

## 2021-03-04 NOTE — CDI QUERY NOTE - NSCDIOTHERTXTBX_GEN_ALL_CORE_HH
The patient presented with AMS, found to have encephalopathy, empyema within the chronic traumatic SDH, respiratory acidosis, hypernatremia and osteomyelitis.    Please clarify the specific type of encephalopathy, if known?  = metabolic encephalopathy,  = other type of encephalopathy,  = unable to determine.    Thank you.

## 2021-04-22 NOTE — ED ADULT NURSE NOTE - CAS EDN DISCHARGE INTERVENTIONS
flonase      Last Written Prescription Date:  2/22/21  Last Fill Quantity: 16 g,   # refills: 1  Last Office Visit: 12/18/2020  Future Office visit:       
IV intact

## 2021-05-11 PROBLEM — I48.91 UNSPECIFIED ATRIAL FIBRILLATION: Chronic | Status: ACTIVE | Noted: 2021-01-01

## 2021-05-11 PROBLEM — I10 ESSENTIAL (PRIMARY) HYPERTENSION: Chronic | Status: ACTIVE | Noted: 2021-01-01

## 2021-05-11 PROBLEM — I63.9 CEREBRAL INFARCTION, UNSPECIFIED: Chronic | Status: ACTIVE | Noted: 2021-01-01

## 2021-05-11 PROBLEM — E11.9 TYPE 2 DIABETES MELLITUS WITHOUT COMPLICATIONS: Chronic | Status: ACTIVE | Noted: 2021-01-01

## 2021-05-11 PROBLEM — R56.9 UNSPECIFIED CONVULSIONS: Chronic | Status: ACTIVE | Noted: 2021-01-01

## 2021-05-11 PROBLEM — F03.90 UNSPECIFIED DEMENTIA WITHOUT BEHAVIORAL DISTURBANCE: Chronic | Status: ACTIVE | Noted: 2021-01-01

## 2021-05-11 PROBLEM — S06.5X9A TRAUMATIC SUBDURAL HEMORRHAGE WITH LOSS OF CONSCIOUSNESS OF UNSPECIFIED DURATION, INITIAL ENCOUNTER: Chronic | Status: ACTIVE | Noted: 2021-01-01

## 2021-05-11 NOTE — ED PROVIDER NOTE - CLINICAL SUMMARY MEDICAL DECISION MAKING FREE TEXT BOX
89 y/o female PMHx dementa, HTN, DM stroke, afib s/p fall 2020 c/b traumatic SDH bedridden with mutiple ulcerated lesinos on foot sent from nursing home for failure to thrive. Patient DNR DNI. will obtain infectious metabolic w/u and possible admission for FTT ~ZR

## 2021-05-11 NOTE — ED ADULT NURSE NOTE - NSIMPLEMENTINTERV_GEN_ALL_ED
Implemented All Fall with Harm Risk Interventions:  [place] to call system. Call bell, personal items and telephone within reach. Instruct patient to call for assistance. Room bathroom lighting operational. Non-slip footwear when patient is off stretcher. Physically safe environment: no spills, clutter or unnecessary equipment. Stretcher in lowest position, wheels locked, appropriate side rails in place. Provide visual cue, wrist band, yellow gown, etc. Monitor gait and stability. Monitor for mental status changes and reorient to person, place, and time. Review medications for side effects contributing to fall risk. Reinforce activity limits and safety measures with patient and family. Provide visual clues: red socks.

## 2021-05-11 NOTE — H&P ADULT - NSHPSOURCEINFOTX_GEN_ALL_CORE
Reviewed with patient's grandson, Andre Reyes, MD, who agrees with plan/care as such.   Reviewed Medex from  [facility]. Reviewed with patient's grandson, Andre Reyes, MD, who agrees with plan/care as such.   History from patient not reliable.  Reviewed Medex from  [facility].

## 2021-05-11 NOTE — H&P ADULT - NSHPLABSRESULTS_GEN_ALL_CORE
WBC 9.7  hgb 14.2    Platelets of 128K.    86% N.    initial basic hemolyzed>>    4PM basic with random glucose of 193.  Cr 0.6    Na_ 168    K+ 3.3    AG 10  HCO3 19    EKG tracing reviewed with atrial fibrillation at 111.    UA with 30 protein.    Lactate 2.0 with adequate capillary refill.    Dilantin level <1.0  Keppra level sent.    Chest radiograph with poor study with rotation, LEFT basilar increased markings.      COVID-19 PCR>> sent. WBC 9.7  hgb 14.2    Platelets of 128K.    86% N.    initial basic hemolyzed>>    4PM basic with random glucose of 193.  Cr 0.6    Na_ 168    K+ 3.3    AG 10  HCO3 19    EKG tracing reviewed with atrial fibrillation at 111.    UA with 30 protein.    Lactate 2.0 with adequate capillary refill.    Dilantin level <1.0  Keppra level sent.    Chest radiograph with poor study with rotation, LEFT basilar increased markings.      COVID-19 PCR>> sent.    MRI brain from 2/2021 with RIGHT lateral convexity subdural collection 1.1 cm questionable empyema

## 2021-05-11 NOTE — H&P ADULT - PROBLEM SELECTOR PLAN 2
See above.  Suspect chronic development of hypernatremia in the SNF>> conservative free water replacement with 1.35 cc/kg/hr (estimated weight of 45 kg)>> 60 cc/hr D5W for 8 hours for now, with correction no faster than 0.5 mEq/L/hr.  Basic chemistry Q4 H.  Daily weight.   Would consider formal renal evaluation in the AM.

## 2021-05-11 NOTE — H&P ADULT - NSHPPHYSICALEXAM_GEN_ALL_CORE
Physical exam with an emaciated, chronically ill appearing F, moans, nonverbal, noncommunicative. Physical exam with an emaciated, chronically ill appearing F, moans, partially awakens with voice, nonverbal, noncommunicative.  Does not follow commands by examiner in patients' native [place]zeri.    Afebrile.  HR  102   RR 14  BP  120/83   94% on 4L/min    HEENT< bitemporal wasting.  Neck supple  NO thyromegaly  Chest poor effort, bony rib cage, decreased breath sounds LEFT base  Cor s1 s2 irregular  Deferred breast exam  Abdomen scaphoid, soft nontender, normal bowel sounds.  Skin dry  Ext LEFT lateral ulcer 1st MTP, RIGHT 5th toe ulcer, erythema, no pus noted.  Neurologic non verbal noncommunicative, moans to voice, tactile stimuli.  does not follow commands.

## 2021-05-11 NOTE — H&P ADULT - HISTORY OF PRESENT ILLNESS
NIGHT HOSPITALIST:   Patient UNKNOWN to me previously, assigned to me at this point via the ER to admit this 89 y/o F--patient is the grandmother of Dr. Reyes above--reviewed records and with Dr. Reyes-- patient with a history of moderate cognitive impairment with patient partially recognizes family, type 2 DM, atrial fibrillation no longer on full AC, past CVA with traumatic B/L subdural haematoma s/P fall in Sept 2020 with complications of seizures, recently discharged from [facility] in Feb 2021 for delirium with patient seen by neurology and treatment for osteomyelitis of the foot with correction of hypernatremia, with patient discharged to the SNF on Feb 26 2021, but now returns following apparently decreased responsiveness and PO at the SNF with hypoxaemia requiring O2 supplementation.   Reported presumptive treatment for cystitis at the SNF.  Unable to obtain history from patient.

## 2021-05-11 NOTE — H&P ADULT - PROBLEM SELECTOR PLAN 1
See above.  CTT head and CTT chest.   Former to exclude new CNS event precipitating poor free water access and replacement.  IV antibiotics to cover for possible aspiration pneumonia v osteomyelitis.  Would consider formal ID evaluation in the AM. See above.  CTT head and CTT chest.   Former to exclude new CNS event precipitating poor free water access and replacement.  IV antibiotics to cover for possible aspiration pneumonia v osteomyelitis.  Would consider formal ID evaluation in the AM.    CTT head nondiagnostic with resolution of RIGHT sided subdural hygroma.  CTT chest with near complete collapse of the LEFT upper and lower lobes due to endobronchial obstruction of the distal LEFT mainstem bronchus, suspected mucus plug.   Will have respiratory therapy assist patient and consider formal pulmonary evaluation in the AM. See above.  CTT head and CTT chest.   Former to exclude new CNS event precipitating poor free water access and replacement.  IV antibiotics to cover for possible aspiration pneumonia v osteomyelitis.  Would consider formal ID evaluation in the AM.    CTT head nondiagnostic with resolution of RIGHT sided subdural hygroma.  CTT chest with near complete collapse of the LEFT upper and lower lobes due to endobronchial obstruction of the distal LEFT mainstem bronchus, suspected mucus plug.   Will have respiratory therapy assist patient and consider formal pulmonary evaluation in the AM>>reviewed results above with Dr. Reyes (grandson)--the family wishes chest physiotherapy and defers suctioning or consideration of bronchoscopy.  Emotional support provided to family.

## 2021-05-11 NOTE — INPATIENT CERTIFICATION FOR MEDICARE PATIENTS - RISKS OF ADVERSE EVENTS
Concern for cardiopulmonary deterioration/Concern for neurologic deterioration/Concern for worsening infectious process/Concern for delay in diagnosis and treatment

## 2021-05-11 NOTE — ED PROVIDER NOTE - OBJECTIVE STATEMENT
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 According to EMS patient was hypoxic to 50s. Patient has not been eating or drinking per EMS and possibly being treated for UTI at nursing home

## 2021-05-11 NOTE — H&P ADULT - ASSESSMENT
NIGHT HOSPITALIST:   NIGHT HOSPITALIST:   Presenting delirium in the setting of patient with underlying cognitive impairment, essential HTN< type 2 DM, past CVA with traumatic B/L subdural haematoma NIGHT HOSPITALIST:   Presenting delirium in the setting of patient with underlying cognitive impairment, essential HTN< type 2 DM, past CVA with traumatic B/L subdural haematoma, questionable empyema (with the decision at the time for conservative management and nonoperative intervention), with severe hypernatremia (suspect chronic >48 hours) despite history of one day per ED note>>will obtain urgent CTT head to exclude a CNS event (if so, likely several days ago) and will obtain a CTT chest.  Concerned about complicating aspiration pneumonia v. osteomyelitis>>will provide IV Azactam and IV Vancomycin for now.   Would consider formal ID evaluation in the AM.    Will provide a conservative free water supplementation with D5W at 1.35 cc/kg/hr  -- last weight 47 kg in 2/2021>> 60 cc /hr for 8 hours for now, and will check Na+ q 4 hours.   Daily weights.    Will provide patient's antiepileptic Rx parenterally.   Will assume aspiration risk.    Will follow FS S/S to monitor for hypoglycemia or hyperglycemia. NIGHT HOSPITALIST:   Presenting delirium in the setting of patient with underlying cognitive impairment, essential HTN< type 2 DM, past CVA with traumatic B/L subdural haematoma, questionable empyema (with the decision at the time for conservative management and nonoperative intervention), with severe hypernatremia (suspect chronic >48 hours) despite history of one day per ED note>>will obtain urgent CTT head to exclude a CNS event (if so, likely several days ago) and will obtain a CTT chest.  Concerned about complicating aspiration pneumonia v. osteomyelitis>>will provide IV Azactam and IV Vancomycin for now.   Would consider formal ID evaluation in the AM.    Will provide a conservative free water supplementation with D5W at 1.35 cc/kg/hr  -- last weight 47 kg in 2/2021>> 60 cc /hr for 8 hours for now, and will check Na+ q 4 hours.   Daily weights.  Consider renal consult in the AM.    Will provide patient's antiepileptic Rx parenterally.   Will assume aspiration risk.    Will follow FS S/S to monitor for hypoglycemia or hyperglycemia.    CHICA for now pending CTT head.    Patient is DNR/ DNI by proxy.  BRYAN in chart.  Family, Dr. A. Reyes, grandson, aware of course and agrees with plan/care as above.  Given patient's comorbidities, patient's prognosis is poor.   Emotional support provided to family.   Care reviewed with covering NP/PA for endorsement to my physician colleagues.    Jose Alfredo Mari MD  [phone] NIGHT HOSPITALIST:   Presenting delirium in the setting of patient with underlying cognitive impairment, essential HTN< type 2 DM, past CVA with traumatic B/L subdural haematoma, questionable empyema (with the decision at the time for conservative management and nonoperative intervention), with severe hypernatremia (suspect chronic >48 hours) despite history of one day per ED note>>will obtain urgent CTT head to exclude a CNS event (if so, likely several days ago) and will obtain a CTT chest.  Concerned about complicating aspiration pneumonia v. osteomyelitis>>will provide IV Azactam and IV Vancomycin for now.   Would consider formal ID evaluation in the AM.    Will provide a conservative free water supplementation with D5W at 1.35 cc/kg/hr  -- last weight 47 kg in 2/2021>> 60 cc /hr for 8 hours for now, and will check Na+ q 4 hours.   Daily weights.  Consider renal consult in the AM.    Will provide patient's antiepileptic Rx parenterally.   Will assume aspiration risk.    Will follow FS S/S to monitor for hypoglycemia or hyperglycemia.    CHICA for now pending CTT head.    Patient is DNR/ DNI by proxy.  BRYAN in chart.  Family, Dr. A. Reyes, grandson, aware of course and agrees with plan/care as above.  Given patient's comorbidities, patient's prognosis is poor.   Emotional support provided to family.   Care reviewed with covering NP, Esme,  for endorsement to my physician colleagues.    Jose Alfredo Mari MD  [phone]

## 2021-05-11 NOTE — ED ADULT NURSE NOTE - OBJECTIVE STATEMENT
89 y/o female presents to ED via EMS from nursing home, EMS reports staff reports pt has decreased PO intake and altered mental status for two days. EMS reports pt is AOx3 at baseline but the past two days pt has had lethargy and history of UTI. On exam, responsive to painful stimuli. Unlabored, spontaneous respirations, NAD, O2 sat 89% RA, placed on 2L NC O2 sat 95%. Abdomen soft, non-tender, non-distended. CM in place. Qureshi catheter placed by RN with second RN at bedside to confirm sterile technique, 16 Fr Qureshi placed, clear, yellow urine draining, specimen sent to lab.

## 2021-05-12 NOTE — PROGRESS NOTE ADULT - PROBLEM SELECTOR PLAN 1
likely due to hypernatremia from dehydration   c/w IVF hydration  CT head negative   CT chest showing mucus plugging and collapse of left lung.   LE ulcers - wound care  empiric antibiotics with vancomycin and aztreonam for now  will consider ID consult if no improvement

## 2021-05-12 NOTE — CONSULT NOTE ADULT - ASSESSMENT
A/P: 89yoF w/PMH/o essential HTN, type 2 DM, past CVA with traumatic B/L subdural hematoma, empyema, severe hypernatremia admitted with aspiration pneumonia v. osteomyelitis    Wound Consult requested to assist w/ management of Evolving Sacral Deep tissue injury    and multiple resolving deep tissue injuries on feet present on admission    Sacrum - Cavilon / Allevyn  Feet- betadine, Consider DPM  Abx per Medicine/ ID  Moisturize intact skin w/ SWEEN cream BID  Nutrition Consult for optimization       consider MVI & Vit C to promote wound healing       encourage high quality protein  Hyperglycemia - consider HgA1c        FS w/ ISS q6h, consider Endo Consult  Continue turning and positioning w/ offloading assistive devices as per protocol  Continue w/ Pericare as per protocol  Waffle Cushion to chair when oob to chair  Continue w/ low air loss bed surface   Care as per medicine, will follow w/ you  Upon discharge [facility] [address] [place] [address] [phone]  Seen w/ attng and D/w team  Thank you for this consult  Estephania Watkins PA-C [id number]

## 2021-05-12 NOTE — CONSULT NOTE ADULT - PROBLEM SELECTOR RECOMMENDATION 9
Imaging of brain unrevealing other than known structural abnormalities and age related changes.  Free water replacement as per primary team.  May be that thirst mechanisms are failing with advancing dementia or access to water deficient.

## 2021-05-12 NOTE — CONSULT NOTE ADULT - PROBLEM SELECTOR PROBLEM 6
EXAM DATE/TIME:  11/16/2017 13:48 

 

[facility] COMPARISON:     

No previous studies available for comparison.

        

 

 

INDICATIONS :     

Abdominal distention. 

                     

 

MEDICAL HISTORY :     

Chronic obstructive pulmonary disease. Myocardial infarction. Hypercholesterolemia. Seizures. Syncope
. Congestive heart failure. 

 

SURGICAL HISTORY :     

Tonsillectomy.     Neurologic surgery. CABG. Cardiac catheterization. Coronary stent. Hernia repair. 
Carotid surgery.

 

ENCOUNTER:     

Initial

 

ACUITY:     

1 day

 

PAIN SCORE:     

2/10

 

LOCATION:         

Abdomen. 

                     

MEASUREMENTS:     

 

LIVER:     

19.3 cm length 

 

COMMON DUCT:     

9 mm

 

RIGHT KIDNEY:     

10.2 x 5.4 x 5.3 cm

 

LEFT KIDNEY:     

10.4 x 6.1 x 6.2 cm

 

SPLEEN:     

11.1 cm length

 

AORTA:     

2.7cm maximal      

 

FINDINGS:     

 

LIVER:     

Normal echotexture without focal lesion or ductal dilatation.  

 

COMMON DUCT:     

No intraluminal mass or stone visualized.  

 

GALLBLADDER:     

Contains no stones, demonstrates no wall thickening or pericholecystic fluid.  

 

PANCREAS:     

Partially obscured by overlying bowel gas.  

 

RIGHT KIDNEY:     

6 x 5 x 8 mm nonobstructing stone in the lower pole of the right kidney. No hydronephrosis.

 

LEFT KIDNEY:     

No hydronephrosis, stone or mass.  2 cm benign cyst in the lateral midpole.

 

SPLEEN:     

No focal lesion.  

 

AORTA:     

Non aneurysmal.  

 

IVC:     

Within normal limits.  

 

CONCLUSION:     

1. 6 x 8 mm nonobstructing stone in the lower pole collecting system of the right kidney.

2. Simple cortical cyst laterally in the midpole of the left kidney.

3. Pancreas is partially obscured by overlying bowel gas. Visualized portions are sonographically int
act.

4. Otherwise negative.

 

 

 

 Trace Meehan MD on November 16, 2017 at 15:02           

Board Certified Radiologist.

 This report was verified electronically. Encounter for palliative care

## 2021-05-12 NOTE — CONSULT NOTE ADULT - SUBJECTIVE AND OBJECTIVE BOX
Wound SURGERY CONSULT NOTE    HPI:  89 y/o patient with a history of moderate cognitive impairment with patient partially recognizes family, type 2 DM, atrial fibrillation no longer on full AC, past CVA with traumatic B/L subdural haematoma s/P fall in Sept 2020 with complications of seizures, recently discharged from [facility] in Feb 2021 for delirium with patient seen by neurology and treatment for osteomyelitis of the foot with correction of hypernatremia, with patient discharged to the SNF on Feb 26 2021, but now returns following apparently decreased responsiveness and PO at the SNF with hypoxaemia requiring O2 supplementation.   Reported presumptive treatment for cystitis at the SNF.  Unable to obtain history from patient. no f/c/s noted. Wound consult requested to assist w/ management of      multiple pressure injuries present on admission.  Pt unable to c/o pain, drainage, odor. (+) sedentary, incontinent of urine & stool with offloading & pericare initiated. Allevyn used a while awaiting consult.  Appetite have been good    Current Diet: Diet, NPO:   Except Medications (05-11-21 @ 18:14)      PAST MEDICAL & SURGICAL HISTORY:  SDH (subdural hematoma)  Atrial fibrillation  Dementia  Diabetes  HTN (hypertension)  Cerebrovascular accident (CVA)  Seizure      REVIEW OF SYSTEMS: Pt unable to offer    MEDICATIONS  (STANDING):  acetaminophen  IVPB .. 750 milliGRAM(s) IV Intermittent once      aztreonam  IVPB 1000 milliGRAM(s) IV Intermittent every 12 hours  dextrose 40% Gel 15 Gram(s) Oral once  dextrose 5%. 1000 milliLiter(s) (50 mL/Hr) IV Continuous <Continuous>  dextrose 5%. 1000 milliLiter(s) (100 mL/Hr) IV Continuous <Continuous>  dextrose 5%. 1000 milliLiter(s) (75 mL/Hr) IV Continuous <Continuous>  dextrose 50% Injectable 25 Gram(s) IV Push once  dextrose 50% Injectable 12.5 Gram(s) IV Push once  dextrose 50% Injectable 25 Gram(s) IV Push once  fosphenytoin IVPB 100 milliGRAM(s) PE IV Intermittent every 8 hours  glucagon  Injectable 1 milliGRAM(s) IntraMuscular once  insulin lispro (ADMELOG) corrective regimen sliding scale   SubCutaneous every 6 hours  levETIRAcetam  IVPB 750 milliGRAM(s) IV Intermittent every 12 hours  metoprolol tartrate 25 milliGRAM(s) Oral two times a day  vancomycin  IVPB 500 milliGRAM(s) IV Intermittent every 24 hours      Allergies  penicillin (Rash)    SOCIAL HISTORY:  lives in SNF; no smoking, ETOH, drugs    FAMILY HISTORY: diabetes mellitus    PHYSICAL EXAM:  Vital Signs Last 24 Hrs  T(C): 36.4 (12 May 2021 05:29), Max: 36.8 (11 May 2021 14:00)  T(F): 97.5 (12 May 2021 05:29), Max: 98.2 (11 May 2021 14:00)  HR: 101 (12 May 2021 11:03) (101 - 120)  BP: 116/63 (12 May 2021 05:29) (108/76 - 138/92)  BP(mean): --  RR: 20 (12 May 2021 05:29) (19 - 26)  SpO2: 92% (12 May 2021 05:29) (92% - 100%)    NAD / gaurded but stable,  A&Ox3/ Alert/ Confused  cachectic/ MO/ Obese/ frail  WD/ WN/ WG/ Disheveled  Total Care Sport/ Versa Care P500 bed/ [place]     HEENT:  NC/AT, PERRL, EOMI, mucosa moist, throat clear, trachea midline, neck supple    Cardiovascular: RRR (+)m    Respiratory: CTA  Gastrointestinal soft NT/ND (+)BS  (+)PEG (+)ostomy  Neurology: nonverbal, no follow commands  Musculoskeletal: passive ROM  Vascular: BLE equally warm,  no cyanosis, clubbing, edema     faint DP/PT pulses palpable     BLE hemosiderin staining     Lateral 5th MTP bilateral w/ resolving intact blisters     purple areas on mtp w/o blistering     No odor, drainage, erythema, increased warmth, tenderness, induration, fluctuance  Skin:  moist w/ good turgor     Sacrum w/ deep purple/ maroon hyperpigmented tissue in 5cm 7cm x 0.1cm     1cm x 1cm area within this area -denuded     no drainage or blistering     No odor, erythema, increased warmth, tenderness, induration, fluctuance    LABS/ CULTURES/ RADIOLOGY:                        12.6   9.45  )-----------( 143      ( 12 May 2021 07:33 )             44.2       166  |  133  |  32  ----------------------------<  179      [05-12-21 @ 07:33]  3.9   |  21  |  0.64        Ca     9.0     [05-12-21 @ 07:33]      Mg     2.9     [05-12-21 @ 00:16]      Phos  2.7     [05-12-21 @ 00:16]    TPro  7.7  /  Alb  2.9  /  TBili  0.4  /  DBili  x   /  AST  84  /  ALT  35  /  AlkPhos  103  [05-11-21 @ 14:31]    PT/INR: PT 15.3 , INR 1.29       [05-11-21 @ 20:02]`wo  PTT: 31.3       [05-11-21 @ 20:02]

## 2021-05-12 NOTE — PROGRESS NOTE ADULT - PROBLEM SELECTOR PLAN 5
monitor Fs   remains NPO due to mental status - if improves will start on dm diet   for now - low dose ISS for coverage   A1c 7

## 2021-05-12 NOTE — PROGRESS NOTE ADULT - PROBLEM SELECTOR PLAN 2
started on d5gtt without significant improvement of Na   increased rate - repeat bmp in 4 hours   will need to monitor Na   no s/s of volume overload on exam

## 2021-05-12 NOTE — PROGRESS NOTE ADULT - PROBLEM SELECTOR PLAN 3
with left lung collapse  d/w grandelaine - Dr. Reyes - no aggressive suctioning   c/w chest PT   will add mucomyst nebs to help mobilize secretions  rosendo suctioning

## 2021-05-12 NOTE — CONSULT NOTE ADULT - PROBLEM SELECTOR RECOMMENDATION 6
As per documentation, family wishes to pursue conservative management of medical issues.  Will reach out to them for additional support.  If desired, patient does meet criteria for home hospice services. As per documentation, family wishes to pursue conservative management of medical issues.  Will reach out to them for additional support.  If desired, patient does meet criteria for home hospice services.    Addendum - subsequent face to face visit with grandson Dr. Andre Reyes, reviewed medical situation and care goals - Mrs. Hernandes had stated in past conversations that she would never want a feeding tube or to be kept alive on machines.  The current goal is for conservative medical management, IVF's and antibiotics.  Discussion with daughter Simran on 5/13 planned.

## 2021-05-12 NOTE — CONSULT NOTE ADULT - ASSESSMENT
89 y/o F--patient is the grandmother of Dr. Reyes, with a history of moderate cognitive impairment, type 2 DM, atrial fibrillation no longer on full AC, past CVA with traumatic B/L subdural hematoma s/P fall in Sept 2020 with complications of seizures, recently discharged from [facility] in Feb 2021 for delirium with patient seen by neurology and treatment for osteomyelitis of the foot with correction of hypernatremia, with patient discharged to the SNF on Feb 26 2021, but now returns with decreased responsiveness and PO, and hypoxemia   Work-up notable for hypernatremia and collapse of the left upper and lower lobes.  Family has opted for conservative management.

## 2021-05-12 NOTE — PROGRESS NOTE ADULT - SUBJECTIVE AND OBJECTIVE BOX
ZOIE [id number]  89y  Female  Failure to thrive in child    Yes    FH: diabetes mellitus    Handoff    At Risk    MEWS Score    SDH (subdural hematoma)    Atrial fibrillation    Dementia    Diabetes    HTN (hypertension)    Cerebrovascular accident (CVA)    Seizure    Failure to thrive (0-17)    Delirium    Hypernatremia    Seizure disorder    Type 2 diabetes mellitus with hyperglycemia, with long-term current use of insulin    Chronic atrial fibrillation    Discharge planning issues    Atelectasis of left lung    Advanced dementia    Pressure ulcer    Functional quadriplegia    Encounter for palliative care    No significant past surgical history    WEAKNESS/CONFUSED    73    [email or web address]      aztreonam  IVPB      aztreonam  IVPB 1000 milliGRAM(s) IV Intermittent every 12 hours  dextrose 40% Gel 15 Gram(s) Oral once  dextrose 5%. 1000 milliLiter(s) IV Continuous <Continuous>  dextrose 5%. 1000 milliLiter(s) IV Continuous <Continuous>  dextrose 5%. 1000 milliLiter(s) IV Continuous <Continuous>  dextrose 50% Injectable 25 Gram(s) IV Push once  dextrose 50% Injectable 12.5 Gram(s) IV Push once  dextrose 50% Injectable 25 Gram(s) IV Push once  fosphenytoin IVPB 100 milliGRAM(s) PE IV Intermittent every 8 hours  glucagon  Injectable 1 milliGRAM(s) IntraMuscular once  insulin lispro (ADMELOG) corrective regimen sliding scale   SubCutaneous every 6 hours  levETIRAcetam  IVPB 750 milliGRAM(s) IV Intermittent every 12 hours  metoprolol tartrate 25 milliGRAM(s) Oral two times a day  vancomycin  IVPB 500 milliGRAM(s) IV Intermittent every 24 hours  vancomycin  IVPB      penicillin (Rash)    CBC Full  -  ( 12 May 2021 07:33 )  WBC Count : 9.45 K/uL  RBC Count : 4.33 M/uL  Hemoglobin : 12.6 g/dL  Hematocrit : 44.2 %  Platelet Count - Automated : 143 K/uL  Mean Cell Volume : 102.1 fl  Mean Cell Hemoglobin : 29.1 pg  Mean Cell Hemoglobin Concentration : 28.5 gm/dL  Auto Neutrophil # : x  Auto Lymphocyte # : x  Auto Monocyte # : x  Auto Eosinophil # : x  Auto Basophil # : x  Auto Neutrophil % : x  Auto Lymphocyte % : x  Auto Monocyte % : x  Auto Eosinophil % : x  Auto Basophil % : x  89 year old female visited at bedside for evaluation of stable ulcerative lesion medial 1 metatarsal phalangeal joint left foot. Eschar dorsal 5th toe right foot. DP and PT non-palpable both feet +PVD. No edema, erythema or drainage no signs of cellulitis both feet. No signs of acute infection both feet. Recommend betadine and dsd daily and continue with z-float boots  Podiatry will check next week. reconsult podiatry sooner if needed   ZOIE [id number]  89y  Female  Failure to thrive in child    Yes    FH: diabetes mellitus    Handoff    At Risk    MEWS Score    SDH (subdural hematoma)    Atrial fibrillation    Dementia    Diabetes    HTN (hypertension)    Cerebrovascular accident (CVA)    Seizure    Failure to thrive (0-17)    Delirium    Hypernatremia    Seizure disorder    Type 2 diabetes mellitus with hyperglycemia, with long-term current use of insulin    Chronic atrial fibrillation    Discharge planning issues    Atelectasis of left lung    Advanced dementia    Pressure ulcer    Functional quadriplegia    Encounter for palliative care    No significant past surgical history    WEAKNESS/CONFUSED    73    SysAdmin_VisitLink      aztreonam  IVPB      aztreonam  IVPB 1000 milliGRAM(s) IV Intermittent every 12 hours  dextrose 40% Gel 15 Gram(s) Oral once  dextrose 5%. 1000 milliLiter(s) IV Continuous <Continuous>  dextrose 5%. 1000 milliLiter(s) IV Continuous <Continuous>  dextrose 5%. 1000 milliLiter(s) IV Continuous <Continuous>  dextrose 50% Injectable 25 Gram(s) IV Push once  dextrose 50% Injectable 12.5 Gram(s) IV Push once  dextrose 50% Injectable 25 Gram(s) IV Push once  fosphenytoin IVPB 100 milliGRAM(s) PE IV Intermittent every 8 hours  glucagon  Injectable 1 milliGRAM(s) IntraMuscular once  insulin lispro (ADMELOG) corrective regimen sliding scale   SubCutaneous every 6 hours  levETIRAcetam  IVPB 750 milliGRAM(s) IV Intermittent every 12 hours  metoprolol tartrate 25 milliGRAM(s) Oral two times a day  vancomycin  IVPB 500 milliGRAM(s) IV Intermittent every 24 hours  vancomycin  IVPB      penicillin (Rash)    CBC Full  -  ( 12 May 2021 07:33 )  WBC Count : 9.45 K/uL  RBC Count : 4.33 M/uL  Hemoglobin : 12.6 g/dL  Hematocrit : 44.2 %  Platelet Count - Automated : 143 K/uL  Mean Cell Volume : 102.1 fl  Mean Cell Hemoglobin : 29.1 pg  Mean Cell Hemoglobin Concentration : 28.5 gm/dL  Auto Neutrophil # : x  Auto Lymphocyte # : x  Auto Monocyte # : x  Auto Eosinophil # : x  Auto Basophil # : x  Auto Neutrophil % : x  Auto Lymphocyte % : x  Auto Monocyte % : x  Auto Eosinophil % : x  Auto Basophil % : x

## 2021-05-12 NOTE — PROGRESS NOTE ADULT - SUBJECTIVE AND OBJECTIVE BOX
[facility] Division of Hospital Medicine  Alisha Jay DO  8a-5pm: [phone]  after 5pm call [phone]    Patient is a 89y old  Female who presents with a chief complaint of Referred from SNF for delirium (12 May 2021 14:22)        SUBJECTIVE / OVERNIGHT EVENTS: minimally verbal, speech slurred       MEDICATIONS  (STANDING):  aztreonam  IVPB      aztreonam  IVPB 1000 milliGRAM(s) IV Intermittent every 12 hours  dextrose 40% Gel 15 Gram(s) Oral once  dextrose 5%. 1000 milliLiter(s) (50 mL/Hr) IV Continuous <Continuous>  dextrose 5%. 1000 milliLiter(s) (100 mL/Hr) IV Continuous <Continuous>  dextrose 5%. 1000 milliLiter(s) (75 mL/Hr) IV Continuous <Continuous>  dextrose 50% Injectable 25 Gram(s) IV Push once  dextrose 50% Injectable 12.5 Gram(s) IV Push once  dextrose 50% Injectable 25 Gram(s) IV Push once  fosphenytoin IVPB 100 milliGRAM(s) PE IV Intermittent every 8 hours  glucagon  Injectable 1 milliGRAM(s) IntraMuscular once  insulin lispro (ADMELOG) corrective regimen sliding scale   SubCutaneous every 6 hours  levETIRAcetam  IVPB 750 milliGRAM(s) IV Intermittent every 12 hours  metoprolol tartrate 25 milliGRAM(s) Oral two times a day  vancomycin  IVPB 500 milliGRAM(s) IV Intermittent every 24 hours  vancomycin  IVPB        MEDICATIONS  (PRN):      Vital Signs Last 24 Hrs  T(C): 36.6 (12 May 2021 14:45), Max: 36.7 (11 May 2021 18:35)  T(F): 97.9 (12 May 2021 14:45), Max: 98 (11 May 2021 18:35)  HR: 100 (12 May 2021 14:45) (100 - 114)  BP: 102/65 (12 May 2021 14:45) (102/65 - 120/83)  BP(mean): --  RR: 20 (12 May 2021 14:45) (20 - 26)  SpO2: 95% (12 May 2021 14:45) (92% - 100%)  CAPILLARY BLOOD GLUCOSE      POCT Blood Glucose.: 169 mg/dL (12 May 2021 12:06)  POCT Blood Glucose.: 191 mg/dL (12 May 2021 05:38)  POCT Blood Glucose.: 223 mg/dL (11 May 2021 23:59)  POCT Blood Glucose.: 213 mg/dL (11 May 2021 19:06)    I&O's Summary    11 May 2021 07:01  -  12 May 2021 07:00  --------------------------------------------------------  IN: 0 mL / OUT: 400 mL / NET: -400 mL        PHYSICAL EXAM:  GENERAL: NAD, breathing normal, cachectic  HEAD:  Atraumatic, Normocephalic  EYES: conjunctiva and sclera clear  NECK: supple, No JVD  CHEST/LUNG: CTA b/l anteriorly, no crackles appreciated   HEART: S1 S2 RRR  ABDOMEN: +BS Soft, NT/ND  EXTREMITIES:  2+ DP Pulses, No c/c. no LE edema  NEUROLOGY: AAOx0, no facial droop, moving extremities  SKIN: 2cm ulcer at left metatarsal joint with mild surrounding erythema, no drainage, no significant warmth, small ulcers on right 5th toe with mild surrounding erythema no drainage.     LABS:                        12.6   9.45  )-----------( 143      ( 12 May 2021 07:33 )             44.2     05-12    166<HH>  |  133<H>  |  32<H>  ----------------------------<  179<H>  3.9   |  21<L>  |  0.64    Ca    9.0      12 May 2021 07:33  Phos  2.7     05-12  Mg     2.9     05-12    TPro  7.7  /  Alb  2.9<L>  /  TBili  0.4  /  DBili  x   /  AST  84<H>  /  ALT  35  /  AlkPhos  103  05-11    PT/INR - ( 11 May 2021 20:02 )   PT: 15.3 sec;   INR: 1.29 ratio         PTT - ( 11 May 2021 20:02 )  PTT:31.3 sec      Urinalysis Basic - ( 11 May 2021 14:36 )    Color: Yellow / Appearance: Slightly Turbid / S.030 / pH: x  Gluc: x / Ketone: Trace  / Bili: Negative / Urobili: 3 mg/dL   Blood: x / Protein: 30 mg/dL / Nitrite: Negative   Leuk Esterase: Negative / RBC: 5 /hpf / WBC 5 /HPF   Sq Epi: x / Non Sq Epi: 2 /hpf / Bacteria: Negative        RADIOLOGY & ADDITIONAL TESTS:    Imaging Personally Reviewed:  Consultant(s) Notes Reviewed:    Care Discussed with Consultants/Other Providers:

## 2021-05-12 NOTE — CHART NOTE - NSCHARTNOTEFT_GEN_A_CORE
ZOIE BILLINGSLEY    Notified by RN patient with critical lab result   05-12    163<HH>  |  132<H>  |  36<H>  ----------------------------<  221<H>  5.6<H>  |  21<L>  |  0.63    Na level trending down post D5W @ 60 cc/H X 8 hrs.   Na 166-->165-->163  K level elevated but is hemolyzed    Interventions taken-  >Continue to monitor  >Trend BMP q 4 Hrs.  >Endorse to day team to follow up

## 2021-05-12 NOTE — CONSULT NOTE ADULT - SUBJECTIVE AND OBJECTIVE BOX
HPI:   89 y/o F--patient is the grandmother of Dr. Reyes, with a history of moderate cognitive impairment, type 2 DM, atrial fibrillation no longer on full AC, past CVA with traumatic B/L subdural hematoma s/P fall in 2020 with complications of seizures, recently discharged from [place] in 2021 for delirium with patient seen by neurology and treatment for osteomyelitis of the foot with correction of hypernatremia, with patient discharged to the SNF on 2021, but now returns with decreased responsiveness and PO, and hypoxemia    Reported treatment for cystitis at the SNF.  Unable to obtain history from patient. (11 May 2021 17:47)    Work-up notable for hypernatremia and collapse of the left upper and lower lobes.  Family has opted for conservative management.      PERTINENT PM/SXH:   SDH (subdural hematoma)  Atrial fibrillation  Dementia  Diabetes  HTN (hypertension)  Cerebrovascular accident (CVA)  Seizure    No significant past surgical history    FAMILY HISTORY:  FH: diabetes mellitus    ITEMS NOT CHECKED ARE NOT PRESENT    SOCIAL HISTORY:   Significant other/partner[ ]  Children[x ]  Congregation/Spirituality: Baptism  Substance hx:  [ ]   Tobacco hx:  [ ]   Alcohol hx: [ ]   Home Opioid hx:  [ ] I-Stop Reference No:  Living Situation: [ ]Home  [x ]Long term care  [ ]Rehab [ ]Other    ADVANCE DIRECTIVES:    DNR  Yes    MOLST  [x ]  Living Will  [ ]   DECISION MAKER(s):  [ ] Health Care Proxy(s)  [ ] Surrogate(s)  [ ] Guardian           Name(s):  Dr. Reyes Phone Number(s):    BASELINE (I)ADL(s) (prior to admission):  [place]: [ ]Total  [ ] Moderate [x ]Dependent    Allergies    penicillin (Rash)    Intolerances    MEDICATIONS  (STANDING):  acetaminophen  IVPB .. 1000 milliGRAM(s) IV Intermittent once  aztreonam  IVPB      aztreonam  IVPB 1000 milliGRAM(s) IV Intermittent every 12 hours  dextrose 40% Gel 15 Gram(s) Oral once  dextrose 5%. 1000 milliLiter(s) (50 mL/Hr) IV Continuous <Continuous>  dextrose 5%. 1000 milliLiter(s) (100 mL/Hr) IV Continuous <Continuous>  dextrose 5%. 1000 milliLiter(s) (75 mL/Hr) IV Continuous <Continuous>  dextrose 50% Injectable 25 Gram(s) IV Push once  dextrose 50% Injectable 12.5 Gram(s) IV Push once  dextrose 50% Injectable 25 Gram(s) IV Push once  fosphenytoin IVPB 100 milliGRAM(s) PE IV Intermittent every 8 hours  glucagon  Injectable 1 milliGRAM(s) IntraMuscular once  insulin lispro (ADMELOG) corrective regimen sliding scale   SubCutaneous every 6 hours  levETIRAcetam  IVPB 750 milliGRAM(s) IV Intermittent every 12 hours  metoprolol tartrate 25 milliGRAM(s) Oral two times a day  vancomycin  IVPB 500 milliGRAM(s) IV Intermittent every 24 hours  vancomycin  IVPB        MEDICATIONS  (PRN):    PRESENT SYMPTOMS: [x ]Unable to obtain due to poor mentation   Source if other than patient:  [ ]Family   [x ]Team     Pain: [ ]yes [ x]no  QOL impact -   Location -                    Aggravating factors -  Quality -  Radiation -  Timing-  Severity (0-10 scale):  Minimal acceptable level (0-10 scale):     CPOT:    https://www.[facility].org/getattachment/[email or web address]/Critical-Care-Pain-Observation-Tool-(CPOT)      PAIN AD Score: 0    http://geriatrictoolkit.[place].[facility]/cog/painad.pdf (press ctrl +  left click to view)    Dyspnea:                           [ ]Mild [ ]Moderate [ ]Severe  Anxiety:                             [ ]Mild [ ]Moderate [ ]Severe  Fatigue:                             [ ]Mild [ ]Moderate [ ]Severe  Nausea:                             [ ]Mild [ ]Moderate [ ]Severe  Loss of appetite:              [ ]Mild [ ]Moderate [ ]Severe  Constipation:                    [ ]Mild [ ]Moderate [ ]Severe    Other Symptoms:  [ x]All other review of systems negative     Palliative Performance Status Version 2:   30      %    http://npcrc.org/files/news/palliative_performance_scale_ppsv2.pdf  PHYSICAL EXAM:  Vital Signs Last 24 Hrs  T(C): 36.4 (12 May 2021 05:29), Max: 36.8 (11 May 2021 14:00)  T(F): 97.5 (12 May 2021 05:29), Max: 98.2 (11 May 2021 14:00)  HR: 101 (12 May 2021 11:03) (101 - 120)  BP: 116/63 (12 May 2021 05:29) (108/76 - 138/92)  BP(mean): --  RR: 20 (12 May 2021 05:29) (19 - 26)  SpO2: 92% (12 May 2021 05:29) (92% - 100%) I&O's Summary    11 May 2021 07:01  -  12 May 2021 07:00  --------------------------------------------------------  IN: 0 mL / OUT: 400 mL / NET: -400 mL      GENERAL:  [ ]Alert  [ ]Oriented x   [x ]Lethargic  [ ]Cachexia  [ ]Unarousable  [ ]Verbal  [x ]Non-Verbal  Behavioral:   [ ] Anxiety  [ ] Delirium [ ] Agitation [ ] Other  HEENT:  [x ]Normal   [ ]Dry mouth   [ ]ET Tube/Trach  [ ]Oral lesions  PULMONARY:   [ ]Clear [ ]Tachypnea  [ ]Audible excessive secretions   [ ]Rhonchi        [ ]Right [ ]Left [ ]Bilateral  [ ]Crackles        [ ]Right [ ]Left [ ]Bilateral  [ ]Wheezing     [ ]Right [ ]Left [ ]Bilateral  [ ]Diminished breath sounds [ ]right [ ]left [ ]bilateral  CARDIOVASCULAR:    [ ]Regular [ ]Irregular [ ]Tachy  [ ]Jerry [ ]Murmur [ ]Other  GASTROINTESTINAL:  [ ]Soft  [ ]Distended   [ ]+BS  [ ]Non tender [ ]Tender  [ ]PEG [ ]OGT/ NGT  Last BM:     GENITOURINARY:  [ ]Normal [ ] Incontinent   [ ]Oliguria/Anuria   [ ]Qureshi  MUSCULOSKELETAL:   [ ]Normal   [ ]Weakness  [ ]Bed/Wheelchair bound [ ]Edema  NEUROLOGIC:   [ ]No focal deficits  [ ]Cognitive impairment  [ ]Dysphagia [ ]Dysarthria [ ]Paresis [ ]Other   SKIN:   [ ]Normal    [ ]Rash  [ ]Pressure ulcer(s)       Present on admission [ ]y [ ]n    CRITICAL CARE:  [ ] Shock Present  [ ]Septic [ ]Cardiogenic [ ]Neurologic [ ]Hypovolemic  [ ]  Vasopressors [ ]  Inotropes   [ ]Respiratory failure present [ ]Mechanical ventilation [ ]Non-invasive ventilatory support [ ]High flow    [ ]Acute  [ ]Chronic [ ]Hypoxic  [ ]Hypercarbic [ ]Other  [ ]Other organ failure     LABS:                        12.6   9.45  )-----------( 143      ( 12 May 2021 07:33 )             44.2   -12    166<HH>  |  133<H>  |  32<H>  ----------------------------<  179<H>  3.9   |  21<L>  |  0.64    Ca    9.0      12 May 2021 07:33  Phos  2.7       Mg     2.9         TPro  7.7  /  Alb  2.9<L>  /  TBili  0.4  /  DBili  x   /  AST  84<H>  /  ALT  35  /  AlkPhos  103  -11  PT/INR - ( 11 May 2021 20:02 )   PT: 15.3 sec;   INR: 1.29 ratio         PTT - ( 11 May 2021 20:02 )  PTT:31.3 sec    Urinalysis Basic - ( 11 May 2021 14:36 )    Color: Yellow / Appearance: Slightly Turbid / S.030 / pH: x  Gluc: x / Ketone: Trace  / Bili: Negative / Urobili: 3 mg/dL   Blood: x / Protein: 30 mg/dL / Nitrite: Negative   Leuk Esterase: Negative / RBC: 5 /hpf / WBC 5 /HPF   Sq Epi: x / Non Sq Epi: 2 /hpf / Bacteria: Negative      RADIOLOGY & ADDITIONAL STUDIES:  < from: CT Head No Cont (21 @ 20:27) >    EXAM:  CT BRAIN                            PROCEDURE DATE:  2021      INTERPRETATION:  HISTORY: Failure to thrive    Comparison 21    There is no acute hemorrhage, cortical edema, mass effect or hydrocephalus. A trace right-sided subdural hygroma previously described has near completely regressed with trace residual. There is mild right occipital encephalomalacia. There is predominantly central age typical  atrophy and chronic white matter degeneration.    IMPRESSION:  No acute findings      LAMONT BLACKWOOD MD; Attending Radiologist  This document has been electronically signed. May 11 2021  8:55PM    < end of copied text >  < from: Xray Chest 1 View- PORTABLE-Urgent (Xray Chest 1 View- PORTABLE-Urgent .) (21 @ 14:36) >    EXAM:  XR CHEST PORTABLE URGENT 1V                            PROCEDURE DATE:  2021        INTERPRETATION:  Portable chest radiograph    CLINICAL INFORMATION: Dyspnea, shortness of breath.    TECHNIQUE:  Portable  AP view of the chest wasobtained.    COMPARISON: 2021 chest available for review.    FINDINGS:  Image obtained in the steep LEFT posterior oblique projection distorting heart size.  RIGHT lung clear.. LEFT lung base and posterior lung bases cannot be adequately assessed on radiograph submitted..  No pneumothorax.    The heart and mediastinum are within normal limits.    Visualized osseous structures are intact.      IMPRESSION: Reduction of LEFT lung limited due to oblique positioning of patient. RIGHT lung clear.  Follow-up repeat straight AP and lateral radiographs recommended for further evaluation of lung parenchyma      UMBERTO OCAMPO MD; Attending Radiologist  This document has been electronically signed. May 11 2021  3:10PM     PROTEIN CALORIE MALNUTRITION PRESENT: [ ]mild [ ]moderate [ ]severe [ ]underweight [ ]morbid obesity  https://www.andeal.org/vault/2440/web/files/ONC/Table_Clinical%20Characteristics%20to%20Document%20Malnutrition-White%20JV%20et%20al%2020.pdf    Height (cm): 160 (21 @ 13:46), 160 (21 @ 16:10)  Weight (kg): 47.2 (21 @ 16:10)  BMI (kg/m2): 18.4 (21 @ 13:46), 18.4 (21 @ 16:10)    [ ]PPSV2 < or = to 30% [ ]significant weight loss  [ ]poor nutritional intake  [ ]anasarca     Albumin, Serum: 2.9 g/dL (21 @ 14:31)   [ ]Artificial Nutrition      REFERRALS:   [ ]Chaplaincy  [ ]Hospice  [ ]Child Life  [ ]Social Work  [ ]Case management [ ]Holistic Therapy     Goals of Care Document:     `                            Care Coordination Assessment 201    COGNITIVE/LEARNING 201  Mental Status    Answers: Confused    ADMISSION HISTORY 201  Admitted From    Answers: Skilled Nursing Facility-Long Term    FINANCIAL 201  Does patient have financial concerns for discharge?    Answers: None    LIVING ARRANGEMENTS/SUPPORT 201  Housing Environment    Answers: Skilled Nursing Facility    Sources of support/caregivers    Answers: Children    CAREGIVER CONTACT 201  Does the patient wish to identify a Caregiver?    Answers: Unable to assess    EMERGENCY CONTACTS OUTSIDE HOME 201  Emergency Contact    Answers: Emergency Contact Name Simran Tejada,  Answers: Emergency Contact Phone # [phone],  Answers: Emergency Contact Relationship daughter    DISCHARGE PLANNING 201  Potential Discharge Plan and Services    Answers: Skilled Nursing Facility-Long Term    SCREENING 201  Social Work Screen and Referral    Answers: None    SUMMARY 201  Initial Clinical Summary    Notes: Covering . Case management rferral received for this 89 year  old female with a pmh of dementia, HTN, DM2, AFib, CVA,  traumatic bilateral  SDH s/p fall in 2020 & seizures admitted with AMS & left great toe pressure  ulcer. Patient confused & unable to participate in interview. Spoke with  patient's dauughter Simran Tejada [phone] & grandson Dr Andre Reyes  [phone] to outline role of  & understanding verbalized.  Patient resides as a long term resident at [facility] located at  [address] [place] [address] [phone] on the long term dementia  unit. Plan is for patient to return when cleared for discharge. Will require  ambulance transportation for safety/monitoring due to dementia diagnosis.    Anticipated Discharge Plan and Services    Notes: Return to long term SNF.       Electronically signed by:  Yvrose Garrison  Electronically signed on:  2021  12:09   HPI:   89 y/o F--patient is the grandmother of Dr. Reyes, with a history of moderate cognitive impairment, type 2 DM, atrial fibrillation no longer on full AC, past CVA with traumatic B/L subdural hematoma s/P fall in 2020 with complications of seizures, recently discharged from [place] in 2021 for delirium with patient seen by neurology and treatment for osteomyelitis of the foot with correction of hypernatremia, with patient discharged to the SNF on 2021, but now returns with decreased responsiveness and PO, and hypoxemia    Reported treatment for cystitis at the SNF.  Unable to obtain history from patient. (11 May 2021 17:47)    Work-up notable for hypernatremia and collapse of the left upper and lower lobes.  Family has opted for conservative management.      PERTINENT PM/SXH:   SDH (subdural hematoma)  Atrial fibrillation  Dementia  Diabetes  HTN (hypertension)  Cerebrovascular accident (CVA)  Seizure    No significant past surgical history    FAMILY HISTORY:  FH: diabetes mellitus    ITEMS NOT CHECKED ARE NOT PRESENT    SOCIAL HISTORY:   Significant other/partner[ ]  Children[x ]  Anglican/Spirituality: Jew  Substance hx:  [ ]   Tobacco hx:  [ ]   Alcohol hx: [ ]   Home Opioid hx:  [ ] I-Stop Reference No:  Living Situation: [ ]Home  [x ]Long term care  [ ]Rehab [ ]Other    ADVANCE DIRECTIVES:    DNR  Yes    MOLST  [x ]  Living Will  [ ]   DECISION MAKER(s):  [ ] Health Care Proxy(s)  [ ] Surrogate(s)  [ ] Guardian           Name(s):  Dr. Reyes Phone Number(s):    BASELINE (I)ADL(s) (prior to admission):  [place]: [ ]Total  [ ] Moderate [x ]Dependent    Allergies    penicillin (Rash)    Intolerances    MEDICATIONS  (STANDING):  acetaminophen  IVPB .. 1000 milliGRAM(s) IV Intermittent once  aztreonam  IVPB      aztreonam  IVPB 1000 milliGRAM(s) IV Intermittent every 12 hours  dextrose 40% Gel 15 Gram(s) Oral once  dextrose 5%. 1000 milliLiter(s) (50 mL/Hr) IV Continuous <Continuous>  dextrose 5%. 1000 milliLiter(s) (100 mL/Hr) IV Continuous <Continuous>  dextrose 5%. 1000 milliLiter(s) (75 mL/Hr) IV Continuous <Continuous>  dextrose 50% Injectable 25 Gram(s) IV Push once  dextrose 50% Injectable 12.5 Gram(s) IV Push once  dextrose 50% Injectable 25 Gram(s) IV Push once  fosphenytoin IVPB 100 milliGRAM(s) PE IV Intermittent every 8 hours  glucagon  Injectable 1 milliGRAM(s) IntraMuscular once  insulin lispro (ADMELOG) corrective regimen sliding scale   SubCutaneous every 6 hours  levETIRAcetam  IVPB 750 milliGRAM(s) IV Intermittent every 12 hours  metoprolol tartrate 25 milliGRAM(s) Oral two times a day  vancomycin  IVPB 500 milliGRAM(s) IV Intermittent every 24 hours  vancomycin  IVPB        MEDICATIONS  (PRN):    PRESENT SYMPTOMS: [x ]Unable to obtain due to poor mentation   Source if other than patient:  [ ]Family   [x ]Team     Pain: [ ]yes [ x]no  QOL impact -   Location -                    Aggravating factors -  Quality -  Radiation -  Timing-  Severity (0-10 scale):  Minimal acceptable level (0-10 scale):     CPOT:    https://www.[facility].org/getattachment/[email or web address]/Critical-Care-Pain-Observation-Tool-(CPOT)      PAIN AD Score: 0    http://[email or web address].[email or web address]/painad.pdf (press ctrl +  left click to view)    Dyspnea:                           [ ]Mild [ ]Moderate [ ]Severe  Anxiety:                             [ ]Mild [ ]Moderate [ ]Severe  Fatigue:                             [ ]Mild [ ]Moderate [ ]Severe  Nausea:                             [ ]Mild [ ]Moderate [ ]Severe  Loss of appetite:              [ ]Mild [ ]Moderate [ ]Severe  Constipation:                    [ ]Mild [ ]Moderate [ ]Severe    Other Symptoms:  [ x]All other review of systems negative     Palliative Performance Status Version 2:   30      %    http://npcrc.org/files/news/palliative_performance_scale_ppsv2.pdf  PHYSICAL EXAM:  Vital Signs Last 24 Hrs  T(C): 36.4 (12 May 2021 05:29), Max: 36.8 (11 May 2021 14:00)  T(F): 97.5 (12 May 2021 05:29), Max: 98.2 (11 May 2021 14:00)  HR: 101 (12 May 2021 11:03) (101 - 120)  BP: 116/63 (12 May 2021 05:29) (108/76 - 138/92)  BP(mean): --  RR: 20 (12 May 2021 05:29) (19 - 26)  SpO2: 92% (12 May 2021 05:29) (92% - 100%) I&O's Summary    11 May 2021 07:01  -  12 May 2021 07:00  --------------------------------------------------------  IN: 0 mL / OUT: 400 mL / NET: -400 mL      GENERAL: Opens eyes to stimulus  [ ]Alert  [ ]Oriented x   [x ]Lethargic  [ ]Cachexia  [ ]Unarousable  [ ]Verbal  [x ]Non-Verbal  Behavioral:   [ ] Anxiety  [ ] Delirium [ ] Agitation [ ] Other  HEENT:  [x ]Normal   [ ]Dry mouth   [ ]ET Tube/Trach  [ ]Oral lesions  PULMONARY:   [x ]Clear [ ]Tachypnea  [ ]Audible excessive secretions   [ ]Rhonchi        [ ]Right [ ]Left [ ]Bilateral  [ ]Crackles        [ ]Right [ ]Left [ ]Bilateral  [ ]Wheezing     [ ]Right [ ]Left [ ]Bilateral  [ ]Diminished breath sounds [ ]right [ ]left [ ]bilateral  CARDIOVASCULAR:    [ ]Regular [x ]Irregular [x ]Tachy  [ ]Jerry [ ]Murmur [ ]Other  GASTROINTESTINAL:  [ x]Soft  [ ]Distended   [x ]+BS  [x ]Non tender [ ]Tender  [ ]PEG [ ]OGT/ NGT  Last BM: fecal incontinence    GENITOURINARY:  [ ]Normal [x ] Incontinent   [ ]Oliguria/Anuria   [x ]Qureshi  MUSCULOSKELETAL:   [ ]Normal   [x ]Weakness  [ ]Bed/Wheelchair bound [ ]Edema  NEUROLOGIC:   [ ]No focal deficits  [x ]Cognitive impairment  [ ]Dysphagia [ ]Dysarthria [x ]Paresis left upper extremity, no resistance to movement  [ ]Other   SKIN:   [ ]Normal    [ ]Rash  [x ]Pressure ulcer(s)       Present on admission [ x]y [ ]n  For more information please see RN documentation which I have reviewed.     CRITICAL CARE:  [ ] Shock Present  [ ]Septic [ ]Cardiogenic [ ]Neurologic [ ]Hypovolemic  [ ]  Vasopressors [ ]  Inotropes   [ ]Respiratory failure present [ ]Mechanical ventilation [ ]Non-invasive ventilatory support [ ]High flow    [ ]Acute  [ ]Chronic [ ]Hypoxic  [ ]Hypercarbic [ ]Other  [x ]Other organ failure  brain, skin    LABS:                        12.6   9.45  )-----------( 143      ( 12 May 2021 07:33 )             44.2   05-12    166<HH>  |  133<H>  |  32<H>  ----------------------------<  179<H>  3.9   |  21<L>  |  0.64    Ca    9.0      12 May 2021 07:33  Phos  2.7     05-12  Mg     2.9     -12    TPro  7.7  /  Alb  2.9<L>  /  TBili  0.4  /  DBili  x   /  AST  84<H>  /  ALT  35  /  AlkPhos  103  05-11  PT/INR - ( 11 May 2021 20:02 )   PT: 15.3 sec;   INR: 1.29 ratio         PTT - ( 11 May 2021 20:02 )  PTT:31.3 sec    Urinalysis Basic - ( 11 May 2021 14:36 )    Color: Yellow / Appearance: Slightly Turbid / S.030 / pH: x  Gluc: x / Ketone: Trace  / Bili: Negative / Urobili: 3 mg/dL   Blood: x / Protein: 30 mg/dL / Nitrite: Negative   Leuk Esterase: Negative / RBC: 5 /hpf / WBC 5 /HPF   Sq Epi: x / Non Sq Epi: 2 /hpf / Bacteria: Negative      RADIOLOGY & ADDITIONAL STUDIES:  < from: CT Head No Cont (21 @ 20:27) >    EXAM:  CT BRAIN                            PROCEDURE DATE:  2021      INTERPRETATION:  HISTORY: Failure to thrive    Comparison 21    There is no acute hemorrhage, cortical edema, mass effect or hydrocephalus. A trace right-sided subdural hygroma previously described has near completely regressed with trace residual. There is mild right occipital encephalomalacia. There is predominantly central age typical  atrophy and chronic white matter degeneration.    IMPRESSION:  No acute findings      LAMONT BLACKWOOD MD; Attending Radiologist  This document has been electronically signed. May 11 2021  8:55PM    < end of copied text >  < from: Xray Chest 1 View- PORTABLE-Urgent (Xray Chest 1 View- PORTABLE-Urgent .) (21 @ 14:36) >    EXAM:  XR CHEST PORTABLE URGENT 1V                            PROCEDURE DATE:  2021        INTERPRETATION:  Portable chest radiograph    CLINICAL INFORMATION: Dyspnea, shortness of breath.    TECHNIQUE:  Portable  AP view of the chest wasobtained.    COMPARISON: 2021 chest available for review.    FINDINGS:  Image obtained in the steep LEFT posterior oblique projection distorting heart size.  RIGHT lung clear.. LEFT lung base and posterior lung bases cannot be adequately assessed on radiograph submitted..  No pneumothorax.    The heart and mediastinum are within normal limits.    Visualized osseous structures are intact.      IMPRESSION: Reduction of LEFT lung limited due to oblique positioning of patient. RIGHT lung clear.  Follow-up repeat straight AP and lateral radiographs recommended for further evaluation of lung parenchyma      UMBERTO OCAMPO MD; Attending Radiologist  This document has been electronically signed. May 11 2021  3:10PM     PROTEIN CALORIE MALNUTRITION PRESENT: [ ]mild [ ]moderate [ ]severe [ ]underweight [ ]morbid obesity  https://www.andeal.org/vault/2440/web/files/ONC/Table_Clinical%20Characteristics%20to%20Document%20Malnutrition-White%20JV%20et%20al%733321.pdf    Height (cm): 160 (21 @ 13:46), 160 (21 @ 16:10)  Weight (kg): 47.2 (21 @ 16:10)  BMI (kg/m2): 18.4 (21 @ 13:46), 18.4 (21 @ 16:10)    [ ]PPSV2 < or = to 30% [ ]significant weight loss  [ ]poor nutritional intake  [ ]anasarca     Albumin, Serum: 2.9 g/dL (21 @ 14:31)   [ ]Artificial Nutrition      REFERRALS:   [ ]Chaplaincy  [ ]Hospice  [ ]Child Life  [ ]Social Work  [ ]Case management [ ]Holistic Therapy     Goals of Care Document:     `                            Care Coordination Assessment 201    COGNITIVE/LEARNING 201  Mental Status    Answers: Confused    ADMISSION HISTORY 201  Admitted From    Answers: Skilled Nursing Facility-Long Term    FINANCIAL 201  Does patient have financial concerns for discharge?    Answers: None    LIVING ARRANGEMENTS/SUPPORT 201  Housing Environment    Answers: Skilled Nursing Facility    Sources of support/caregivers    Answers: Children    CAREGIVER CONTACT 201  Does the patient wish to identify a Caregiver?    Answers: Unable to assess    EMERGENCY CONTACTS OUTSIDE HOME 201  Emergency Contact    Answers: Emergency Contact Name Simran Tejada,  Answers: Emergency Contact Phone # [phone],  Answers: Emergency Contact Relationship daughter    DISCHARGE PLANNING 201  Potential Discharge Plan and Services    Answers: Skilled Nursing Facility-Long Term    SCREENING 201  Social Work Screen and Referral    Answers: None    SUMMARY 201  Initial Clinical Summary    Notes: Covering . Case management rferral received for this 89 year  old female with a pmh of dementia, HTN, DM2, AFib, CVA,  traumatic bilateral  SDH s/p fall in 2020 & seizures admitted with AMS & left great toe pressure  ulcer. Patient confused & unable to participate in interview. Spoke with  patient's damichaelter Simran Tejada [phone] & grandson Dr Andre Reyes  [phone] to outline role of  & understanding verbalized.  Patient resides as a long term resident at [facility] located at  [address] [place] [address] [phone] on the long term dementia  unit. Plan is for patient to return when cleared for discharge. Will require  ambulance transportation for safety/monitoring due to dementia diagnosis.    Anticipated Discharge Plan and Services    Notes: Return to long term SNF.       Electronically signed by:  Yvrose Garrison  Electronically signed on:  2021  12:09

## 2021-05-13 NOTE — PROGRESS NOTE ADULT - SUBJECTIVE AND OBJECTIVE BOX
SUBJECTIVE AND OBJECTIVE:  INTERVAL HPI/OVERNIGHT EVENTS: 89 year old female with dementia, admitted for altered mental status due to possible aspiration/osteomyelitis of foot and hypernatremia.  Appears slightly more alert this am, opens eyes and groans to greeting, with movement of right hand as if to take off oxygen.    DNR on chart: Yes      Allergies    penicillin (Rash)    Intolerances    MEDICATIONS  (STANDING):  acetylcysteine 20% for bronchoscopy 4 milliLiter(s) Nebulizer <User Schedule>  aztreonam  IVPB      aztreonam  IVPB 1000 milliGRAM(s) IV Intermittent every 12 hours  dextrose 40% Gel 15 Gram(s) Oral once  dextrose 5%. 1000 milliLiter(s) (50 mL/Hr) IV Continuous <Continuous>  dextrose 5%. 1000 milliLiter(s) (100 mL/Hr) IV Continuous <Continuous>  dextrose 5%. 1000 milliLiter(s) (100 mL/Hr) IV Continuous <Continuous>  dextrose 50% Injectable 25 Gram(s) IV Push once  dextrose 50% Injectable 12.5 Gram(s) IV Push once  dextrose 50% Injectable 25 Gram(s) IV Push once  fosphenytoin IVPB 100 milliGRAM(s) PE IV Intermittent every 8 hours  glucagon  Injectable 1 milliGRAM(s) IntraMuscular once  insulin lispro (ADMELOG) corrective regimen sliding scale   SubCutaneous every 6 hours  levETIRAcetam  IVPB 750 milliGRAM(s) IV Intermittent every 12 hours  metoprolol tartrate 25 milliGRAM(s) Oral two times a day  vancomycin  IVPB 500 milliGRAM(s) IV Intermittent every 24 hours  vancomycin  IVPB        MEDICATIONS  (PRN):      ITEMS UNCHECKED ARE NOT PRESENT    PRESENT SYMPTOMS: [x ]Unable to obtain due to poor mentation   Source if other than patient:  [ ]Family   [x ]Team     Pain:  [ ]yes [x ]no  see pain ads score  QOL impact -   Location -                    Aggravating factors -  Quality -  Radiation -  Timing-  Severity (0-10 scale):  Minimal acceptable level (0-10 scale):     Dyspnea:                           [ ]Mild [ ]Moderate [ ]Severe  Anxiety:                             [ ]Mild [ ]Moderate [ ]Severe  Fatigue:                             [ ]Mild [ ]Moderate [ ]Severe  Nausea:                             [ ]Mild [ ]Moderate [ ]Severe  Loss of appetite:              [ ]Mild [ ]Moderate [ ]Severe  Constipation:                    [ ]Mild [ ]Moderate [ ]Severe    CPOT:    https://www.[facility].org/getattachment/[email or web address]-9e0x-9r7g-0723o7900n5k/Critical-Care-Pain-Observation-Tool-(CPOT)    PAIN AD Score:	  http://geriatrictoolkit.[email or web address]/painad.pdf (Ctrl + left click to view)    Other Symptoms:  [ ]All other review of systems negative     Palliative Performance Status Version 2:   30      %      http://[facility].org/files/news/palliative_performance_scale_ppsv2.pdf  PHYSICAL EXAM:  Vital Signs Last 24 Hrs  T(C): 36.4 (13 May 2021 08:05), Max: 36.6 (12 May 2021 14:45)  T(F): 97.5 (13 May 2021 08:05), Max: 97.9 (12 May 2021 14:45)  HR: 94 (13 May 2021 08:05) (86 - 107)  BP: 98/66 (13 May 2021 08:05) (98/60 - 111/69)  BP(mean): --  RR: 16 (13 May 2021 08:05) (16 - 20)  SpO2: 97% (13 May 2021 08:05) (95% - 100%) I&O's Summary    12 May 2021 07:01  -  13 May 2021 07:00  --------------------------------------------------------  IN: 0 mL / OUT: 550 mL / NET: -550 mL       GENERAL:  [ ]Alert  [ ]Oriented x   [s ]Lethargic  [ ]Cachexia  [ ]Unarousable  [ ]Verbal  [s ]Non-Verbal  Behavioral:   [ ]Anxiety  [ ]Delirium [ ]Agitation [ ]Other  HEENT:  [ ]Normal   [s ]Dry mouth   [ ]ET Tube/Trach  [ ]Oral lesions  PULMONARY:   [s ]Clear [ ]Tachypnea  [ ]Audible excessive secretions   [s ]Rhonchi        [ ]Right [s ]Left [ ]Bilateral  [ ]Crackles        [ ]Right [ ]Left [ ]Bilateral  [ ]Wheezing     [ ]Right [ ]Left [ ]Bilateral  [ ]Diminished BS [ ] Right [ ]Left [ ]Bilateral  CARDIOVASCULAR:    [s ]Regular [ ]Irregular [ ]Tachy  [ ]Jerry [ ]Murmur [ ]Other  GASTROINTESTINAL:  [s ]Soft  [ ]Distended   [s ]+BS  [s ]Non tender [ ]Tender  [ ]PEG [ ]OGT/ NGT   Last BM: fecal incontinence     GENITOURINARY:  [ ]Normal [x ]Incontinent   [ ]Oliguria/Anuria   [x ]Qureshi  MUSCULOSKELETAL:   [ ]Normal   [ ]Weakness  [ ]Bed/Wheelchair bound [ ]Edema boots on lower extremities   NEUROLOGIC:   [ ]No focal deficits  [x ] Cognitive impairment  [x ] Dysphagia [ ]Dysarthria [ ] Paresis [ ]Other   SKIN:   [ ]Normal  [ ]Rash   [x ]Pressure ulcer(s) [x ]y [ ]n present on admission For more information please see RN documentation which I have reviewed.     CRITICAL CARE:  [ ]Shock Present  [ ]Septic [ ]Cardiogenic [ ]Neurologic [ ]Hypovolemic  [ ]Vasopressors [ ]Inotropes  [ ]Respiratory failure present [ ]Mechanical Ventilation [ ]Non-invasive ventilatory support [ ]High-Flow   [ ]Acute  [ ]Chronic [ ]Hypoxic  [ ]Hypercarbic [ ]Other  [x ]Other organ failure  brain, skin    LABS:                        12.6   9.45  )-----------( 143      ( 12 May 2021 07:33 )             44.2   05-13    157<H>  |  126<H>  |  22  ----------------------------<  202<H>  3.6   |  23  |  0.59    Ca    8.5      13 May 2021 08:19  Phos  2.7     05-12  Mg     2.7     05-13    TPro  7.7  /  Alb  2.9<L>  /  TBili  0.4  /  DBili  x   /  AST  84<H>  /  ALT  35  /  AlkPhos  103  05-11  PT/INR - ( 11 May 2021 20:02 )   PT: 15.3 sec;   INR: 1.29 ratio         PTT - ( 11 May 2021 20:02 )  PTT:31.3 sec    Urinalysis Basic - ( 11 May 2021 14:36 )    Color: Yellow / Appearance: Slightly Turbid / S.030 / pH: x  Gluc: x / Ketone: Trace  / Bili: Negative / Urobili: 3 mg/dL   Blood: x / Protein: 30 mg/dL / Nitrite: Negative   Leuk Esterase: Negative / RBC: 5 /hpf / WBC 5 /HPF   Sq Epi: x / Non Sq Epi: 2 /hpf / Bacteria: Negative      RADIOLOGY & ADDITIONAL STUDIES: None new.     Protein Calorie Malnutrition Present: [ ]mild [ ]moderate [ ]severe [ ]underweight [ ]morbid obesity  https://www.andeal.org/vault/2440/web/files/ONC/Table_Clinical%20Characteristics%20to%20Document%20Malnutrition-White%20JV%20et%20al%2020.pdf    Height (cm): 154.9 (21 @ 13:09), 160 (21 @ 16:10)  Weight (kg): 48.2 (21 @ 13:09), 47.2 (21 @ 16:10)  BMI (kg/m2): 20.1 (21 @ 13:09), 18.4 (21 @ 16:10)    [x ]PPSV2 < or = 30%  [x ]significant weight loss [x ]poor nutritional intake [ ]anasarca   Albumin, Serum: 2.9 g/dL (21 @ 14:31)   [ ]Artificial Nutrition    REFERRALS:   [ ]Chaplaincy  [ ]Hospice  [ ]Child Life  [ x]Social Work  [ ]Case management [ ]Holistic Therapy     Goals of Care Document:

## 2021-05-13 NOTE — PROVIDER CONTACT NOTE (CRITICAL VALUE NOTIFICATION) - ACTION/TREATMENT ORDERED:
see ordered .
MD made aware, will continue to monitor patient
orders carried out, will continue to monitor
MD made aware, will continue to monitor patient
MD aware, will continue to monitor patient

## 2021-05-13 NOTE — PROVIDER CONTACT NOTE (CRITICAL VALUE NOTIFICATION) - SITUATION
patient admitted with hypernatremia
patient admitted with hypernatremia Na 166 and now Na 163
patient admitted with hypernatremia Na of 166 and now Na 165
Received critical value for sodium 160
sodium level 166

## 2021-05-13 NOTE — PROGRESS NOTE ADULT - PROBLEM SELECTOR PLAN 6
Spoke with patient's daughter Laurie, reviewed current medical condition and provided supportive listening, with a better understanding of how difficult it is for her to be so far away from her mom.  We offered to arrange for a face time or skype visit, she is planning for an inpatient visit on Saturday.  She does not want her mom to suffer and understands that this is the trajectory of advanced dementia - as normal functions fail, infections and diminished intake lead to death at some point.   Time spent in non face to face discussion with daughter 20 minutes.

## 2021-05-13 NOTE — PROVIDER CONTACT NOTE (CRITICAL VALUE NOTIFICATION) - BACKGROUND
HX: seizure, DM, CVA, A Fib, SDH
HX: seizure, CVA, DM, HTN
HX: CVA, Seizure, HTN, Dementia, Diabetes
pt had history of elevated sodium
patient exhibit continued elevated sodium levels

## 2021-05-13 NOTE — PROVIDER CONTACT NOTE (CRITICAL VALUE NOTIFICATION) - PERSON GIVING RESULT:
[facility]- Ernesto Wright
[facility]- Juan Ramon Adams
Malka gonzalez
ketan/ Ronan Gil
Hiral Santamaria

## 2021-05-13 NOTE — PROGRESS NOTE ADULT - PROBLEM SELECTOR PLAN 1
likely due to hypernatremia from dehydration   c/w IVF hydration  CT head negative   CT chest showing mucus plugging and collapse of left lung.   LE ulcers - wound care  would d/c Abx after 72 hours as BCx negative to date, foot does not look infectied

## 2021-05-13 NOTE — PROVIDER CONTACT NOTE (CRITICAL VALUE NOTIFICATION) - ASSESSMENT
pt alert x1 ,v/s 116/63 hr 114
patient is stable
labs drawn at midnight
patient is stable
patient is stable

## 2021-05-13 NOTE — PROGRESS NOTE ADULT - SUBJECTIVE AND OBJECTIVE BOX
[facility] Division of Hospital Medicine  Mitchell Powers DO  [phone]  after 5pm call [phone]    Patient is a 89y old  Female who presents with a chief complaint of Referred from SNF for delirium (12 May 2021 14:22)        SUBJECTIVE / OVERNIGHT EVENTS: minimally verbal, speech slurred       MEDICATIONS  (STANDING):  aztreonam  IVPB      aztreonam  IVPB 1000 milliGRAM(s) IV Intermittent every 12 hours  dextrose 40% Gel 15 Gram(s) Oral once  dextrose 5%. 1000 milliLiter(s) (50 mL/Hr) IV Continuous <Continuous>  dextrose 5%. 1000 milliLiter(s) (100 mL/Hr) IV Continuous <Continuous>  dextrose 5%. 1000 milliLiter(s) (75 mL/Hr) IV Continuous <Continuous>  dextrose 50% Injectable 25 Gram(s) IV Push once  dextrose 50% Injectable 12.5 Gram(s) IV Push once  dextrose 50% Injectable 25 Gram(s) IV Push once  fosphenytoin IVPB 100 milliGRAM(s) PE IV Intermittent every 8 hours  glucagon  Injectable 1 milliGRAM(s) IntraMuscular once  insulin lispro (ADMELOG) corrective regimen sliding scale   SubCutaneous every 6 hours  levETIRAcetam  IVPB 750 milliGRAM(s) IV Intermittent every 12 hours  metoprolol tartrate 25 milliGRAM(s) Oral two times a day  vancomycin  IVPB 500 milliGRAM(s) IV Intermittent every 24 hours  vancomycin  IVPB        MEDICATIONS  (PRN):      Vital Signs Last 24 Hrs  T(C): 36.4 (13 May 2021 08:05), Max: 36.6 (12 May 2021 14:45)  T(F): 97.5 (13 May 2021 08:05), Max: 97.9 (12 May 2021 14:45)  HR: 94 (13 May 2021 08:05) (86 - 107)  BP: 98/66 (13 May 2021 08:05) (98/60 - 111/69)  BP(mean): --  RR: 16 (13 May 2021 08:05) (16 - 20)  SpO2: 97% (13 May 2021 08:05) (95% - 100%)    CAPILLARY BLOOD GLUCOSE      POCT Blood Glucose.: 287 mg/dL (13 May 2021 11:55)  POCT Blood Glucose.: 182 mg/dL (13 May 2021 05:27)  POCT Blood Glucose.: 183 mg/dL (12 May 2021 23:41)  POCT Blood Glucose.: 228 mg/dL (12 May 2021 17:35)        PHYSICAL EXAM:  GENERAL: NAD, breathing normal, cachectic  HEAD:  Atraumatic, Normocephalic  EYES: conjunctiva and sclera clear  NECK: supple, No JVD  CHEST/LUNG: CTA b/l anteriorly, no crackles appreciated   HEART: S1 S2 RRR  ABDOMEN: +BS Soft, NT/ND  EXTREMITIES:  2+ DP Pulses, No c/c. no LE edema  NEUROLOGY: AAOx0, no facial droop, moving extremities  SKIN: 2cm ulcer at left metatarsal joint with mild surrounding erythema, no drainage, no significant warmth, small ulcers on right 5th toe with mild surrounding erythema no drainage.     LABS:                                 12.6   9.45  )-----------( 143      ( 12 May 2021 07:33 )             44.2   05-13    157<H>  |  126<H>  |  22  ----------------------------<  202<H>  3.6   |  23  |  0.59    Ca    8.5      13 May 2021 08:19  Phos  2.7     05-12  Mg     2.7     05-13    TPro  7.7  /  Alb  2.9<L>  /  TBili  0.4  /  DBili  x   /  AST  84<H>  /  ALT  35  /  AlkPhos  103  05-11      RADIOLOGY & ADDITIONAL TESTS:    Imaging Personally Reviewed:  Consultant(s) Notes Reviewed:    Care Discussed with Consultants/Other Providers:  Pallaitive care team

## 2021-05-14 NOTE — CHART NOTE - NSCHARTNOTEFT_GEN_A_CORE
Patient seen briefly at bedside, appears slightly more awake, still without po intake due to dysphagia, decline in mental status.  Pending visit from daughter this weekend.

## 2021-05-14 NOTE — PROGRESS NOTE ADULT - PROBLEM SELECTOR PLAN 1
likely due to hypernatremia from dehydration   c/w IVF hydration  CT head negative   CT chest showing mucus plugging and collapse of left lung. Left sided lung exam worse today - will postion patient on right side.   LE ulcers - wound care  limit abx to 5 day course.

## 2021-05-14 NOTE — PROGRESS NOTE ADULT - PROBLEM SELECTOR PLAN 5
monitor Fs   placed on diet but minimal oral intake due to mental status  for now - low dose ISS for coverage   A1c 7

## 2021-05-14 NOTE — DIETITIAN INITIAL EVALUATION ADULT. - PROBLEM SELECTOR PLAN 1
See above.  CTT head and CTT chest.   Former to exclude new CNS event precipitating poor free water access and replacement.  IV antibiotics to cover for possible aspiration pneumonia v osteomyelitis.  Would consider formal ID evaluation in the AM.    CTT head nondiagnostic with resolution of RIGHT sided subdural hygroma.  CTT chest with near complete collapse of the LEFT upper and lower lobes due to endobronchial obstruction of the distal LEFT mainstem bronchus, suspected mucus plug.   Will have respiratory therapy assist patient and consider formal pulmonary evaluation in the AM>>reviewed results above with Dr. Reyes (grandson)--the family wishes chest physiotherapy and defers suctioning or consideration of bronchoscopy.  Emotional support provided to family.

## 2021-05-14 NOTE — CHART NOTE - NSCHARTNOTEFT_GEN_A_CORE
ZOIE BILLINGSLEY    Notified by RN patient with critical lab result:       Interventions taken     D/C'd D5w iv fluid for Na 147.  additional Insulin 6 units SQ x1 to keep FS <400  endo consult  cont assess and monitor  f/u with am team.                    Diana Ring ANP-BC  Spectralink #[id number]

## 2021-05-14 NOTE — PROGRESS NOTE ADULT - SUBJECTIVE AND OBJECTIVE BOX
[facility] Division of Hospital Medicine  Alisha Jay DO  8a-5pm: [phone]  after 5pm call [phone]    Patient is a 89y old  Female who presents with a chief complaint of Referred from SNF for delirium (14 May 2021 10:53)        SUBJECTIVE / OVERNIGHT EVENTS: minimally verbal - not clear speech      MEDICATIONS  (STANDING):  acetylcysteine 20% for bronchoscopy 4 milliLiter(s) Nebulizer <User Schedule>  aztreonam  IVPB      aztreonam  IVPB 1000 milliGRAM(s) IV Intermittent every 12 hours  dextrose 40% Gel 15 Gram(s) Oral once  dextrose 5%. 1000 milliLiter(s) (50 mL/Hr) IV Continuous <Continuous>  dextrose 5%. 1000 milliLiter(s) (100 mL/Hr) IV Continuous <Continuous>  dextrose 5%. 1000 milliLiter(s) (100 mL/Hr) IV Continuous <Continuous>  dextrose 50% Injectable 25 Gram(s) IV Push once  dextrose 50% Injectable 12.5 Gram(s) IV Push once  dextrose 50% Injectable 25 Gram(s) IV Push once  fosphenytoin IVPB 100 milliGRAM(s) PE IV Intermittent every 8 hours  glucagon  Injectable 1 milliGRAM(s) IntraMuscular once  insulin lispro (ADMELOG) corrective regimen sliding scale   SubCutaneous every 6 hours  levETIRAcetam  IVPB 750 milliGRAM(s) IV Intermittent every 12 hours  metoprolol tartrate 25 milliGRAM(s) Oral two times a day  potassium chloride    Tablet ER 40 milliEquivalent(s) Oral once  vancomycin  IVPB 500 milliGRAM(s) IV Intermittent every 24 hours  vancomycin  IVPB        MEDICATIONS  (PRN):      Vital Signs Last 24 Hrs  T(C): 36.4 (14 May 2021 06:17), Max: 36.5 (13 May 2021 22:23)  T(F): 97.6 (14 May 2021 06:17), Max: 97.7 (13 May 2021 22:23)  HR: 84 (14 May 2021 06:17) (80 - 84)  BP: 102/62 (14 May 2021 06:17) (102/62 - 104/69)  BP(mean): --  RR: 18 (14 May 2021 06:17) (18 - 18)  SpO2: 98% (14 May 2021 06:17) (98% - 99%)  CAPILLARY BLOOD GLUCOSE      POCT Blood Glucose.: 148 mg/dL (14 May 2021 12:10)  POCT Blood Glucose.: 186 mg/dL (14 May 2021 08:31)  POCT Blood Glucose.: 194 mg/dL (14 May 2021 06:02)  POCT Blood Glucose.: 246 mg/dL (13 May 2021 23:35)  POCT Blood Glucose.: 214 mg/dL (13 May 2021 17:01)    I&O's Summary    13 May 2021 07:01  -  14 May 2021 07:00  --------------------------------------------------------  IN: 800 mL / OUT: 520 mL / NET: 280 mL        PHYSICAL EXAM:  GENERAL: NAD, breathing normal, cachectic  HEAD:  Atraumatic, Normocephalic  EYES: conjunctiva and sclera clear  NECK: supple, No JVD  CHEST/LUNG: R side clear anteriorly, Left side decrease breath sounds  HEART: S1 S2 RRR  ABDOMEN: +BS Soft, NT/ND  EXTREMITIES:  2+ DP Pulses, No c/c. no LE edema  NEUROLOGY: AAOx0, no facial droop, moving extremities  SKIN: 2cm ulcer at left metatarsal joint with mild surrounding erythema, no drainage, no significant warmth, small ulcers on right 5th toe with mild surrounding erythema no drainage.       LABS:    05-14    151<H>  |  122<H>  |  13  ----------------------------<  203<H>  3.2<L>   |  20<L>  |  0.46<L>    Ca    7.7<L>      14 May 2021 08:48  Mg     2.7     05-13                RADIOLOGY & ADDITIONAL TESTS:    Imaging Personally Reviewed:  Consultant(s) Notes Reviewed:    Care Discussed with Consultants/Other Providers:

## 2021-05-14 NOTE — DIETITIAN INITIAL EVALUATION ADULT. - REASON INDICATOR FOR ASSESSMENT
Consult received and appreciated on 5/12 for pressure injury stage 2 or >.  Per RN, pt not appropriate for nutrition assessment at this time.

## 2021-05-15 NOTE — PROGRESS NOTE ADULT - PROBLEM SELECTOR PLAN 1
likely due to hypernatremia from dehydration   c/w IVF hydration  CT head negative   CT chest showing mucus plugging and collapse of left lung. Left sided lung exam worse today - will postion patient on right side.   LE ulcers - wound care  limit abx to 5 day course. likely due to hypernatremia from dehydration   c/w IVF hydration  CT head negative   CT chest showing mucus plugging and collapse of left lung. Left sided lung exam improved slightly- will continue to position patient on right side.   LE ulcers - wound care  limit abx to 5 day course - stop abx tomorrow.

## 2021-05-15 NOTE — PROGRESS NOTE ADULT - SUBJECTIVE AND OBJECTIVE BOX
[facility] Division of Hospital Medicine  Alisha Jay DO  8a-5pm: [phone]  after 5pm call [phone]    Patient is a 89y old  Female who presents with a chief complaint of Referred from SNF for delirium (14 May 2021 13:26)        SUBJECTIVE / OVERNIGHT EVENTS: minimally verbal but awake, able to have some of her yogurt this morning      MEDICATIONS  (STANDING):  acetylcysteine 20% for bronchoscopy 4 milliLiter(s) Nebulizer <User Schedule>  aztreonam  IVPB      aztreonam  IVPB 1000 milliGRAM(s) IV Intermittent every 12 hours  dextrose 40% Gel 15 Gram(s) Oral once  dextrose 5%. 1000 milliLiter(s) (50 mL/Hr) IV Continuous <Continuous>  dextrose 5%. 1000 milliLiter(s) (100 mL/Hr) IV Continuous <Continuous>  dextrose 5%. 1000 milliLiter(s) (75 mL/Hr) IV Continuous <Continuous>  dextrose 50% Injectable 25 Gram(s) IV Push once  dextrose 50% Injectable 12.5 Gram(s) IV Push once  dextrose 50% Injectable 25 Gram(s) IV Push once  fosphenytoin IVPB 100 milliGRAM(s) PE IV Intermittent every 8 hours  glucagon  Injectable 1 milliGRAM(s) IntraMuscular once  insulin lispro (ADMELOG) corrective regimen sliding scale   SubCutaneous every 6 hours  levETIRAcetam  IVPB 750 milliGRAM(s) IV Intermittent every 12 hours  metoprolol tartrate 25 milliGRAM(s) Oral two times a day  vancomycin  IVPB 500 milliGRAM(s) IV Intermittent every 24 hours  vancomycin  IVPB        MEDICATIONS  (PRN):      Vital Signs Last 24 Hrs  T(C): 36.6 (15 May 2021 06:11), Max: 36.6 (14 May 2021 16:37)  T(F): 97.9 (15 May 2021 06:11), Max: 97.9 (14 May 2021 22:15)  HR: 89 (15 May 2021 06:11) (72 - 89)  BP: 100/68 (15 May 2021 06:11) (94/58 - 129/84)  BP(mean): --  RR: 18 (15 May 2021 06:11) (18 - 18)  SpO2: 96% (15 May 2021 06:11) (94% - 96%)  CAPILLARY BLOOD GLUCOSE      POCT Blood Glucose.: 90 mg/dL (15 May 2021 06:46)  POCT Blood Glucose.: 46 mg/dL (15 May 2021 06:15)  POCT Blood Glucose.: 54 mg/dL (15 May 2021 06:13)  POCT Blood Glucose.: 294 mg/dL (15 May 2021 00:06)  POCT Blood Glucose.: 403 mg/dL (14 May 2021 23:27)  POCT Blood Glucose.: 192 mg/dL (14 May 2021 17:29)    I&O's Summary    14 May 2021 07:01  -  15 May 2021 07:00  --------------------------------------------------------  IN: 0 mL / OUT: 450 mL / NET: -450 mL        PHYSICAL EXAM:  GENERAL: NAD, breathing normal, cachectic  HEAD:  Atraumatic, Normocephalic  EYES: conjunctiva and sclera clear  NECK: supple, No JVD  CHEST/LUNG: R side clear anteriorly, Left side decrease breath sounds  HEART: S1 S2 RRR  ABDOMEN: +BS Soft, NT/ND  EXTREMITIES:  2+ DP Pulses, No c/c. no LE edema  NEUROLOGY: AAOx0, no facial droop, moving extremities  SKIN: 2cm ulcer at left metatarsal joint with mild surrounding erythema, no drainage, no significant warmth, small ulcers on right 5th toe with mild surrounding erythema no drainage.     LABS:    05-15    146<H>  |  117<H>  |  10  ----------------------------<  156<H>  4.0   |  21<L>  |  0.49<L>    Ca    7.9<L>      15 May 2021 11:30                RADIOLOGY & ADDITIONAL TESTS:    Imaging Personally Reviewed:  Consultant(s) Notes Reviewed:    Care Discussed with Consultants/Other Providers:

## 2021-05-15 NOTE — PROVIDER CONTACT NOTE (HYPOGLYCEMIA EVENT) - NS PROVIDER CONTACT BACKGROUND-HYPO
Age: 89y    Gender: Female    POCT Blood Glucose:  46 mg/dL (05-15-21 @ 06:15)  54 mg/dL (05-15-21 @ 06:13)  294 mg/dL (05-15-21 @ 00:06)  403 mg/dL (05-14-21 @ 23:27)  192 mg/dL (05-14-21 @ 17:29)  148 mg/dL (05-14-21 @ 12:10)  186 mg/dL (05-14-21 @ 08:31)      eMAR:  insulin lispro (ADMELOG) corrective regimen sliding scale   6 Unit(s) SubCutaneous (05-14-21 @ 23:33)   1 Unit(s) SubCutaneous (05-14-21 @ 17:45)    insulin lispro Injectable (ADMELOG).   6 Unit(s) SubCutaneous (05-14-21 @ 23:51)

## 2021-05-15 NOTE — PROGRESS NOTE ADULT - SUBJECTIVE AND OBJECTIVE BOX
Patient is a 89y old  Female who presents with a chief complaint of Referred from SNF for delirium (15 May 2021 12:31)       INTERVAL HPI/OVERNIGHT EVENTS:  Patient seen and evaluated at bedside.  Pt is resting comfortable in NAD.    Allergies    penicillin (Rash)    Intolerances        Vital Signs Last 24 Hrs  T(C): 36.6 (15 May 2021 06:11), Max: 36.6 (15 May 2021 06:11)  T(F): 97.9 (15 May 2021 06:11), Max: 97.9 (15 May 2021 06:11)  HR: 89 (15 May 2021 06:11) (89 - 89)  BP: 100/68 (15 May 2021 06:11) (100/68 - 100/68)  BP(mean): --  RR: 18 (15 May 2021 06:11) (18 - 18)  SpO2: 96% (15 May 2021 06:11) (96% - 96%)    LABS:    05-15    146<H>  |  117<H>  |  10  ----------------------------<  156<H>  4.0   |  21<L>  |  0.49<L>    Ca    7.9<L>      15 May 2021 11:30          CAPILLARY BLOOD GLUCOSE      POCT Blood Glucose.: 124 mg/dL (16 May 2021 00:17)  POCT Blood Glucose.: 187 mg/dL (15 May 2021 17:16)  POCT Blood Glucose.: 206 mg/dL (15 May 2021 12:54)  POCT Blood Glucose.: 90 mg/dL (15 May 2021 06:46)  POCT Blood Glucose.: 46 mg/dL (15 May 2021 06:15)  POCT Blood Glucose.: 54 mg/dL (15 May 2021 06:13)

## 2021-05-15 NOTE — PROVIDER CONTACT NOTE (HYPOGLYCEMIA EVENT) - NS PROVIDER CONTACT RECOMMEND-HYPO
give juice with sugar packets and repeat FS in 30min give juice with sugar packets and repeat FS in 30min, call NP/Provider if FS less than 70

## 2021-05-15 NOTE — PROGRESS NOTE ADULT - PROBLEM SELECTOR PLAN 2
free water deficit 3.1L  restart IVF hydration - repeat Na in 4-6 hours.   monitor Na free water deficit 3.1L  restart IVF hydration - Na improving   monitor Na

## 2021-05-16 NOTE — PROGRESS NOTE ADULT - PROBLEM SELECTOR PLAN 1
hypernatremia from dehydration contributing now resolved   ?LE wound infection s/p abx x 5 day course  no significant improvement in mental status  CT head negative   CT chest showing mucus plugging and collapse of left lung. Left sided lung exam improved slightly- will continue to position patient on right side.   LE ulcers - wound care

## 2021-05-16 NOTE — PROGRESS NOTE ADULT - PROBLEM SELECTOR PLAN 7
patient is DNR/DNI with a focus on comfort -discussed with grandson, discharge to facility with hospice patient is DNR/DNI with a focus on comfort -discussed with grandson, discharge to facility with hospice - hospice eval 5/17

## 2021-05-16 NOTE — DISCHARGE NOTE PROVIDER - CARE PROVIDER_API CALL
QUYNH DUNBAR  Internal Medicine  [address] [place] [id number]  Phone: [phone]  Fax: [phone]  Follow Up Time:

## 2021-05-16 NOTE — PROGRESS NOTE ADULT - SUBJECTIVE AND OBJECTIVE BOX
[facility] Division of Hospital Medicine  Alisha Jay DO  8a-5pm: [phone]  after 5pm call [phone]    Patient is a 89y old  Female who presents with a chief complaint of Referred from SNF for delirium (15 May 2021 16:26)        SUBJECTIVE / OVERNIGHT EVENTS: minimally verbal, not clear speech      MEDICATIONS  (STANDING):  acetylcysteine 20% for bronchoscopy 4 milliLiter(s) Nebulizer <User Schedule>  dextrose 40% Gel 15 Gram(s) Oral once  dextrose 5%. 1000 milliLiter(s) (50 mL/Hr) IV Continuous <Continuous>  dextrose 5%. 1000 milliLiter(s) (100 mL/Hr) IV Continuous <Continuous>  dextrose 50% Injectable 25 Gram(s) IV Push once  dextrose 50% Injectable 12.5 Gram(s) IV Push once  dextrose 50% Injectable 25 Gram(s) IV Push once  fosphenytoin IVPB 100 milliGRAM(s) PE IV Intermittent every 8 hours  glucagon  Injectable 1 milliGRAM(s) IntraMuscular once  insulin lispro (ADMELOG) corrective regimen sliding scale   SubCutaneous every 6 hours  levETIRAcetam  IVPB 750 milliGRAM(s) IV Intermittent every 12 hours  metoprolol tartrate 25 milliGRAM(s) Oral two times a day    MEDICATIONS  (PRN):      Vital Signs Last 24 Hrs  T(C): 36.4 (16 May 2021 08:22), Max: 36.4 (16 May 2021 08:22)  T(F): 97.6 (16 May 2021 08:22), Max: 97.6 (16 May 2021 08:22)  HR: 84 (16 May 2021 08:22) (84 - 84)  BP: 120/78 (16 May 2021 08:22) (120/78 - 124/78)  BP(mean): --  RR: 18 (16 May 2021 08:22) (18 - 18)  SpO2: 99% (16 May 2021 08:22) (99% - 99%)  CAPILLARY BLOOD GLUCOSE      POCT Blood Glucose.: 138 mg/dL (16 May 2021 05:30)  POCT Blood Glucose.: 124 mg/dL (16 May 2021 00:17)  POCT Blood Glucose.: 187 mg/dL (15 May 2021 17:16)  POCT Blood Glucose.: 206 mg/dL (15 May 2021 12:54)    I&O's Summary    15 May 2021 07:01  -  16 May 2021 07:00  --------------------------------------------------------  IN: 0 mL / OUT: 675 mL / NET: -675 mL        PHYSICAL EXAM:  GENERAL: NAD, breathing normal, cachectic  HEAD:  Atraumatic, Normocephalic  EYES: conjunctiva and sclera clear  NECK: supple, No JVD  CHEST/LUNG: R side clear anteriorly, Left side decrease breath sounds  HEART: S1 S2 RRR  ABDOMEN: +BS Soft, NT/ND  EXTREMITIES:  2+ DP Pulses, No c/c. no LE edema  NEUROLOGY: AAOx0, no facial droop, moving extremities  SKIN: 2cm ulcer at left metatarsal joint with mild surrounding erythema, no drainage, no significant warmth, small ulcers on right 5th toe with mild surrounding erythema no drainage.     LABS:    05-16    143  |  116<H>  |  7   ----------------------------<  155<H>  4.1   |  17<L>  |  0.46<L>    Ca    7.7<L>      16 May 2021 07:03    TPro  5.2<L>  /  Alb  2.0<L>  /  TBili  0.2  /  DBili  <0.1  /  AST  17  /  ALT  10  /  AlkPhos  113  05-16              RADIOLOGY & ADDITIONAL TESTS:    Imaging Personally Reviewed:  Consultant(s) Notes Reviewed:    Care Discussed with Consultants/Other Providers:

## 2021-05-16 NOTE — DISCHARGE NOTE PROVIDER - NSDCMRMEDTOKEN_GEN_ALL_CORE_FT
Dakins Half Strength 0.25% topical solution: Apply topically to affected area once a day and as needed  docusate sodium 100 mg oral capsule: 2 cap(s) orally once a day (at bedtime)  furosemide 20 mg oral tablet: 1 tab(s) orally once a day  levETIRAcetam 100 mg/mL oral solution: 7.5 milliliter(s) orally 2 times a day  (9am &amp; 9pm)  Metoprolol Tartrate 25 mg oral tablet: 1 tab(s) orally every 12 hours  Multiple Vitamins oral tablet: 1 tab(s) orally once a day  mupirocin 2% topical ointment: Apply topically to affected area once a day( 5th right toe)  NovoLOG FlexPen 100 units/mL injectable solution: inject by subcutaneous 3 times a day before meals  IF BS =    201- 250 = 2 units  251- 300 = 4 units  301- 350 = 6 units  351- 400 = 8 units    If greater than 400 or below 90  CALL MD  phenytoin 25 mg/mL oral suspension: 4 milliliter(s) orally every 8 hours  (6am,2pm,10pm)  Santyl 250 units/g topical ointment: Apply topically to affected area once a day and as needed  senna 8.6 mg oral tablet: 2 tab(s) orally once a day (at bedtime)  sodium chloride 0.9% intravenous solution: 50 milliliter(s) per hour intravenous 3 times a day for 3 days  Tylenol 325 mg oral tablet: 2 tab(s) orally every 6 hours, As Needed  Tylenol 325 mg oral tablet: 2 tab(s) orally once a day 30 minutes prior dressing change

## 2021-05-17 NOTE — PROGRESS NOTE ADULT - SUBJECTIVE AND OBJECTIVE BOX
HOSPITALIST NOTE    Dr. Rai Hernandez DO  Attending Physician  Division of Hospital Medicine  [facility]  Pager:  [phone]    SUBJECTIVE and ROS limited by dementia/delirium.      PAST MEDICAL & SURGICAL HISTORY:  SDH (subdural hematoma)    Atrial fibrillation    Dementia    Diabetes    HTN (hypertension)    Cerebrovascular accident (CVA)    Seizure    No significant past surgical history        MEDICATIONS  (STANDING):  acetylcysteine 20% for bronchoscopy 4 milliLiter(s) Nebulizer <User Schedule>  dextrose 50% Injectable 25 Gram(s) IV Push once  dextrose 50% Injectable 25 Gram(s) IV Push once  fosphenytoin IVPB 100 milliGRAM(s) PE IV Intermittent every 8 hours  insulin lispro (ADMELOG) corrective regimen sliding scale   SubCutaneous every 6 hours  levETIRAcetam  IVPB 750 milliGRAM(s) IV Intermittent every 12 hours  metoprolol tartrate 25 milliGRAM(s) Oral two times a day    MEDICATIONS  (PRN):      Allergies    penicillin (Rash)    Intolerances        T(C): 36.4 (05-17-21 @ 08:28), Max: 36.6 (05-16-21 @ 16:14)  T(F): 97.6 (05-17-21 @ 08:28), Max: 97.9 (05-16-21 @ 16:14)  HR: 69 (05-17-21 @ 08:28) (69 - 86)  BP: 123/78 (05-17-21 @ 08:28) (105/70 - 132/79)  ABP: --  ABP(mean): --  RR: 18 (05-17-21 @ 08:28) (18 - 19)  SpO2: 97% (05-17-21 @ 08:28) (97% - 97%)      CONSTITUTIONAL: No acute distress.   HEENT:  Conjunctiva clear B/L.  Moist oral mucosa.   Cardiovascular: RRR with no murmurs. No JVD noted. No lower extremity edema B/L. Extremities are warm and well perfused. Radial pulses 2+ B/L. Dorsalis pedis pulses 2+ B/L.    Respiratory: Dec bs of left lung fields. No wrr. No accessory muscle use.   Gastrointestinal:  Soft, nontender. Non-distended. Non-rigid. No CVA tenderness B/L.  MSK:  No joint swelling. No joint erythema B/L. No midline spinal tenderness.  Neurologic:  Awake. Oriented x0. Not following commands.   SKIN: As noted prior:  2cm ulcer at left metatarsal joint with mild surrounding erythema, no drainage, no significant warmth, small ulcers on right 5th toe with mild surrounding erythema no drainage.   Psych:  Normal affect. Normal Mood.           LABS                        12.1   4.65  )-----------( 162      ( 17 May 2021 09:11 )             39.4     05-17    145  |  116<H>  |  8   ----------------------------<  122<H>  3.8   |  22  |  0.42<L>    Ca    7.9<L>      17 May 2021 09:11  Phos  2.5     05-17  Mg     2.2     05-17    TPro  5.2<L>  /  Alb  2.0<L>  /  TBili  0.2  /  DBili  <0.1  /  AST  17  /  ALT  10  /  AlkPhos  113  05-16          ADDITIONAL STUDIES PERSONALLY REVIEWED    Our team discussed the case with all consults    All consultant contribution to care greatly appreciated.

## 2021-05-17 NOTE — PROGRESS NOTE ADULT - SUBJECTIVE AND OBJECTIVE BOX
GAP TEAM PALLIATIVE CARE UNIT PROGRESS NOTE:      [  ] Patient on hospice program.    INDICATION FOR PALLIATIVE CARE UNIT SERVICES:    INTERVAL HPI/OVERNIGHT EVENTS: Chart reviewed. The patient is seen and examined at the bedside    DNR on chart: Yes    Yes      Allergies    penicillin (Rash)    Intolerances    MEDICATIONS  (STANDING):  acetylcysteine 20% for bronchoscopy 4 milliLiter(s) Nebulizer <User Schedule>  dextrose 50% Injectable 25 Gram(s) IV Push once  dextrose 50% Injectable 25 Gram(s) IV Push once  fosphenytoin IVPB 100 milliGRAM(s) PE IV Intermittent every 8 hours  insulin lispro (ADMELOG) corrective regimen sliding scale   SubCutaneous every 6 hours  levETIRAcetam  IVPB 750 milliGRAM(s) IV Intermittent every 12 hours  metoprolol tartrate 25 milliGRAM(s) Oral two times a day    MEDICATIONS  (PRN):    ITEMS UNCHECKED ARE NOT PRESENT    PRESENT SYMPTOMS: [ ]Unable to obtain due to poor mentation   Source if other than patient:  [ ]Family   [ ]Team     Pain: [ ] yes [ ] no  QOL impact -   Location -                    Aggravating factors -  Quality -  Radiation -  Timing-  Severity (0-10 scale):  Minimal acceptable level (0-10 scale):     Dyspnea:                           [ ]Mild [ ]Moderate [ ]Severe  Anxiety:                             [ ]Mild [ ]Moderate [ ]Severe  Fatigue:                             [ ]Mild [ ]Moderate [ ]Severe  Nausea:                             [ ]Mild [ ]Moderate [ ]Severe  Loss of appetite:              [ ]Mild [ ]Moderate [ ]Severe  Constipation:                    [ ]Mild [ ]Moderate [ ]Severe    PAINAD Score:    http://geriatrictoolkit.[email or web address]/painad.pdf (Ctrl +  left click to view)  		  Other Symptoms:  [ ]All other review of systems negative     Palliative Performance Status Version 2:         %         http://npcrc.org/files/news/palliative_performance_scale_ppsv2.pdf  PHYSICAL EXAM:  Vital Signs Last 24 Hrs  T(C): 36.4 (17 May 2021 08:28), Max: 36.6 (16 May 2021 16:14)  T(F): 97.6 (17 May 2021 08:28), Max: 97.9 (16 May 2021 16:14)  HR: 69 (17 May 2021 08:28) (69 - 86)  BP: 123/78 (17 May 2021 08:28) (105/70 - 132/79)  BP(mean): --  RR: 18 (17 May 2021 08:28) (18 - 19)  SpO2: 97% (17 May 2021 08:28) (97% - 97%) I&O's Summary    16 May 2021 07:01  -  17 May 2021 07:00  --------------------------------------------------------  IN: 0 mL / OUT: 725 mL / NET: -725 mL    GENERAL:  [ ]Alert  [ ]Oriented x   [ ]Lethargic  [ ]Cachexia  [ ]Unarousable  [ ]Verbal  [ ]Non-Verbal  Behavioral:   [ ] Anxiety  [ ] Delirium [ ] Agitation [ ] Other  HEENT:  [ ]Normal   [ ]Dry mouth   [ ]ET Tube/Trach  [ ]Oral lesions  PULMONARY:   [ ]Clear [ ]Tachypnea  [ ]Audible excessive secretions   [ ]Rhonchi        [ ]Right [ ]Left [ ]Bilateral  [ ]Crackles        [ ]Right [ ]Left [ ]Bilateral  [ ]Wheezing     [ ]Right [ ]Left [ ]Bilateral  [ ]Diminshed BS [ ]Right [ ]Left [ ]Bilateral    CARDIOVASCULAR:    [ ]Regular [ ]Irregular [ ]Tachy  [ ]Jerry [ ]Murmur [ ]Other  GASTROINTESTINAL:  [ ]Soft  [ ]Distended   [ ]+BS  [ ]Non tender [ ]Tender  [ ]PEG [ ]OGT/ NGT   Last BM:       GENITOURINARY:  [ ]Normal [ ] Incontinent   [ ]Oliguria/Anuria   [ ]Qureshi  MUSCULOSKELETAL:   [ ]Normal   [ ]Weakness  [ ]Bed/Wheelchair bound [ ]Edema  NEUROLOGIC:   [ ]No focal deficits  [ ] Cognitive impairment  [ ] Dysphagia [ ]Dysarthria [ ] Paresis [ ]Other   SKIN:   [ ]Normal  [ ]Rash     [ ]Pressure ulcer(s)  [ ]y [ ]n  Present on admission      CRITICAL CARE:  [ ] Shock Present  [ ]Septic [ ]Cardiogenic [ ]Neurologic [ ]Hypovolemic  [ ]  Vasopressors [ ]  Inotropes   [ ] Respiratory failure present [ ] Mechanical Ventilation [ ] Non-invasive ventilatory support [ ] High-Flow  [ ] Acute  [ ] Chronic [ ] Hypoxic  [ ] Hypercarbic [ ] Other  [ ] Other organ failure     LABS:                        12.1   4.65  )-----------( 162      ( 17 May 2021 09:11 )             39.4   05-17    145  |  116<H>  |  8   ----------------------------<  122<H>  3.8   |  22  |  0.42<L>    Ca    7.9<L>      17 May 2021 09:11  Phos  2.5     05-17  Mg     2.2     05-17    TPro  5.2<L>  /  Alb  2.0<L>  /  TBili  0.2  /  DBili  <0.1  /  AST  17  /  ALT  10  /  AlkPhos  113  05-16        RADIOLOGY & ADDITIONAL STUDIES:    PROTEIN CALORIE MALNUTRITION: [ ] mild [ ] moderate [ ] severe  [ ] underweight [ ] morbid obesity    https://www.andeal.org/vault/2440/web/files/ONC/Table_Clinical%20Characteristics%20to%20Document%20Malnutrition-White%20JV%20et%20al%202012.pdf    Height (cm): 154.9 (05-12-21 @ 13:09), 160 (02-22-21 @ 16:10)  Weight (kg): 48.2 (05-12-21 @ 13:09), 47.2 (02-22-21 @ 16:10)  BMI (kg/m2): 20.1 (05-12-21 @ 13:09), 18.4 (02-22-21 @ 16:10)    [ ] PPSV2 < or = 30% [ ] significant weight loss [ ] poor nutritional intake [ ] anasarca Albumin, Serum: 2.0 g/dL (05-16-21 @ 07:03)    Artificial Nutrition [ ]     REFERRALS:   [ ]Chaplaincy  [ ] Hospice  [ ]Child Life  [ ]Social Work  [ ]Case management [ ]Holistic Therapy [ ] Physical Therapy [ ] Dietary   Goals of Care Document:    GAP TEAM PALLIATIVE CARE UNIT PROGRESS NOTE:      [  ] Patient on hospice program.    INDICATION FOR PALLIATIVE CARE UNIT SERVICES:    INTERVAL HPI/OVERNIGHT EVENTS: Chart reviewed. The patient is seen and examined at the bedside. The patient is lethargic.     DNR on chart: Yes    Yes      Allergies    penicillin (Rash)    Intolerances    MEDICATIONS  (STANDING):  acetylcysteine 20% for bronchoscopy 4 milliLiter(s) Nebulizer <User Schedule>  dextrose 50% Injectable 25 Gram(s) IV Push once  dextrose 50% Injectable 25 Gram(s) IV Push once  fosphenytoin IVPB 100 milliGRAM(s) PE IV Intermittent every 8 hours  insulin lispro (ADMELOG) corrective regimen sliding scale   SubCutaneous every 6 hours  levETIRAcetam  IVPB 750 milliGRAM(s) IV Intermittent every 12 hours  metoprolol tartrate 25 milliGRAM(s) Oral two times a day    MEDICATIONS  (PRN):    ITEMS UNCHECKED ARE NOT PRESENT    PRESENT SYMPTOMS: [ X]Unable to obtain due to poor mentation   Source if other than patient:  [ ]Family   [ X]Team     Pain: [ ] yes [X ] no see pain ad score  QOL impact -   Location -                    Aggravating factors -  Quality -  Radiation -  Timing-  Severity (0-10 scale):  Minimal acceptable level (0-10 scale):     Dyspnea:                           [ ]Mild [ ]Moderate [ ]Severe  Anxiety:                             [ ]Mild [ ]Moderate [ ]Severe  Fatigue:                             [ ]Mild [ ]Moderate [ ]Severe  Nausea:                             [ ]Mild [ ]Moderate [ ]Severe  Loss of appetite:              [ ]Mild [ ]Moderate [ ]Severe  Constipation:                    [ ]Mild [ ]Moderate [ ]Severe    PAINAD Score: 0    http://geriatrictoolkit.[place].[facility]/cog/painad.pdf (Ctrl +  left click to view)  		  Other Symptoms:  [ X]All other review of systems negative     Palliative Performance Status Version 2:  20  %         http://npcrc.org/files/news/palliative_performance_scale_ppsv2.pdf  PHYSICAL EXAM:  Vital Signs Last 24 Hrs  T(C): 36.4 (17 May 2021 08:28), Max: 36.6 (16 May 2021 16:14)  T(F): 97.6 (17 May 2021 08:28), Max: 97.9 (16 May 2021 16:14)  HR: 69 (17 May 2021 08:28) (69 - 86)  BP: 123/78 (17 May 2021 08:28) (105/70 - 132/79)  BP(mean): --  RR: 18 (17 May 2021 08:28) (18 - 19)  SpO2: 97% (17 May 2021 08:28) (97% - 97%) I&O's Summary    16 May 2021 07:01  -  17 May 2021 07:00  --------------------------------------------------------  IN: 0 mL / OUT: 725 mL / NET: -725 mL    GENERAL:  [ ]Alert  [ ]Oriented x   [ X]Lethargic  [ ]Cachexia  [ ]Unarousable  [ ]Verbal  [ ]Non-Verbal  Behavioral:   [ ] Anxiety  [ ] Delirium [ ] Agitation [ ] Other  HEENT:  [ ]Normal   [X ]Dry mouth   [ ]ET Tube/Trach  [ ]Oral lesions  PULMONARY:   [ ]Clear [ ]Tachypnea  [ ]Audible excessive secretions   [ ]Rhonchi        [ ]Right [ ]Left [ ]Bilateral  [ ]Crackles        [ ]Right [ ]Left [ ]Bilateral  [ ]Wheezing     [ ]Right [ ]Left [ ]Bilateral  [X ]Diminshed BS [ ]Right [ ]Left [ x]Bilateral    CARDIOVASCULAR:    [x ]Regular [ ]Irregular [ ]Tachy  [ ]Jerry [ ]Murmur [ ]Other  GASTROINTESTINAL:  [ x]Soft  [ ]Distended   [X ]+BS  [X ]Non tender [ ]Tender  [ ]PEG [ ]OGT/ NGT   Last BM:  5/15/21    GENITOURINARY:  [ ]Normal [ X] Incontinent   [ ]Oliguria/Anuria   [X ]Qureshi  MUSCULOSKELETAL:   [ ]Normal   [X]Weakness  [X ]Bed/Wheelchair bound [ ]Edema  NEUROLOGIC:   [ ]No focal deficits  [ X] Cognitive impairment  [ ] Dysphagia [ ]Dysarthria [ ] Paresis [ ]Other   SKIN:   [ ]Normal  [ ]Rash     [ X]Pressure ulcer(s)  [X ]y [ ]n  Present on admission  Sacrum stage 2, Coccyx stage 2 and left first toe unstageable. Please see nursing assessment for further details     CRITICAL CARE:  [ ] Shock Present  [ ]Septic [ ]Cardiogenic [ ]Neurologic [ ]Hypovolemic  [ ]  Vasopressors [ ]  Inotropes   [ ] Respiratory failure present [ ] Mechanical Ventilation [ ] Non-invasive ventilatory support [ ] High-Flow  [ ] Acute  [ ] Chronic [ ] Hypoxic  [ ] Hypercarbic [ ] Other  [X ] Other organ failure Brain-dementia     LABS:                        12.1   4.65  )-----------( 162      ( 17 May 2021 09:11 )             39.4   05-17    145  |  116<H>  |  8   ----------------------------<  122<H>  3.8   |  22  |  0.42<L>    Ca    7.9<L>      17 May 2021 09:11  Phos  2.5     05-17  Mg     2.2     05-17    TPro  5.2<L>  /  Alb  2.0<L>  /  TBili  0.2  /  DBili  <0.1  /  AST  17  /  ALT  10  /  AlkPhos  113  05-16        RADIOLOGY & ADDITIONAL STUDIES:    PROTEIN CALORIE MALNUTRITION: [ ] mild [ ] moderate [ ] severe  [ ] underweight [ ] morbid obesity    https://www.andeal.org/vault/2440/web/files/ONC/Table_Clinical%20Characteristics%20to%20Document%20Malnutrition-White%20JV%20et%20al%202012.pdf    Height (cm): 154.9 (05-12-21 @ 13:09), 160 (02-22-21 @ 16:10)  Weight (kg): 48.2 (05-12-21 @ 13:09), 47.2 (02-22-21 @ 16:10)  BMI (kg/m2): 20.1 (05-12-21 @ 13:09), 18.4 (02-22-21 @ 16:10)    [X ] PPSV2 < or = 30% [ ] significant weight loss [X ] poor nutritional intake [ ] anasarca Albumin, Serum: 2.0 g/dL (05-16-21 @ 07:03)    Artificial Nutrition [ ]     REFERRALS:   [ ]Chaplaincy  [ X] Hospice  [ ]Child Life  [X ]Social Work  [ ]Case management [ ]Holistic Therapy [ ] Physical Therapy [ ] Dietary   Goals of Care Document:    GAP TEAM PALLIATIVE CARE UNIT PROGRESS NOTE:      [  ] Patient on hospice program.    INDICATION FOR PALLIATIVE CARE UNIT SERVICES:    INTERVAL HPI/OVERNIGHT EVENTS: Chart reviewed. The patient is seen and examined at the bedside. The patient is lethargic.     DNR on chart: Yes    Yes      Allergies    penicillin (Rash)    Intolerances    MEDICATIONS  (STANDING):  acetylcysteine 20% for bronchoscopy 4 milliLiter(s) Nebulizer <User Schedule>  dextrose 50% Injectable 25 Gram(s) IV Push once  dextrose 50% Injectable 25 Gram(s) IV Push once  fosphenytoin IVPB 100 milliGRAM(s) PE IV Intermittent every 8 hours  insulin lispro (ADMELOG) corrective regimen sliding scale   SubCutaneous every 6 hours  levETIRAcetam  IVPB 750 milliGRAM(s) IV Intermittent every 12 hours  metoprolol tartrate 25 milliGRAM(s) Oral two times a day    MEDICATIONS  (PRN):    ITEMS UNCHECKED ARE NOT PRESENT    PRESENT SYMPTOMS: [ X]Unable to obtain due to poor mentation   Source if other than patient:  [ ]Family   [ X]Team     Pain: [ ] yes [X ] no see pain ad score  QOL impact -   Location -                    Aggravating factors -  Quality -  Radiation -  Timing-  Severity (0-10 scale):  Minimal acceptable level (0-10 scale):     Dyspnea:                           [ ]Mild [ ]Moderate [ ]Severe  Anxiety:                             [ ]Mild [ ]Moderate [ ]Severe  Fatigue:                             [ ]Mild [ ]Moderate [ ]Severe  Nausea:                             [ ]Mild [ ]Moderate [ ]Severe  Loss of appetite:              [ ]Mild [ ]Moderate [ ]Severe  Constipation:                    [ ]Mild [ ]Moderate [ ]Severe    PAINAD Score: 0    http://geriatrictoolkit.[place]/cog/painad.pdf (Ctrl +  left click to view)  		  Other Symptoms:  [ X]All other review of systems negative     Palliative Performance Status Version 2:  20  %         http://npcrc.org/files/news/palliative_performance_scale_ppsv2.pdf  PHYSICAL EXAM:  Vital Signs Last 24 Hrs  T(C): 36.4 (17 May 2021 08:28), Max: 36.6 (16 May 2021 16:14)  T(F): 97.6 (17 May 2021 08:28), Max: 97.9 (16 May 2021 16:14)  HR: 69 (17 May 2021 08:28) (69 - 86)  BP: 123/78 (17 May 2021 08:28) (105/70 - 132/79)  BP(mean): --  RR: 18 (17 May 2021 08:28) (18 - 19)  SpO2: 97% (17 May 2021 08:28) (97% - 97%) I&O's Summary    16 May 2021 07:01  -  17 May 2021 07:00  --------------------------------------------------------  IN: 0 mL / OUT: 725 mL / NET: -725 mL    GENERAL:  [ ]Alert  [ ]Oriented x   [ X]Lethargic  [ ]Cachexia  [ ]Unarousable  [ ]Verbal  [ ]Non-Verbal  Behavioral:   [ ] Anxiety  [ ] Delirium [ ] Agitation [ ] Other  HEENT:  [ ]Normal   [X ]Dry mouth   [ ]ET Tube/Trach  [ ]Oral lesions  PULMONARY:   [ ]Clear [ ]Tachypnea  [ ]Audible excessive secretions   [ ]Rhonchi        [ ]Right [ ]Left [ ]Bilateral  [ ]Crackles        [ ]Right [ ]Left [ ]Bilateral  [ ]Wheezing     [ ]Right [ ]Left [ ]Bilateral  [X ]Diminshed BS [ ]Right [ ]Left [ x]Bilateral    CARDIOVASCULAR:    [x ]Regular [ ]Irregular [ ]Tachy  [ ]Jerry [ ]Murmur [ ]Other  GASTROINTESTINAL:  [ x]Soft  [ ]Distended   [X ]+BS  [X ]Non tender [ ]Tender  [ ]PEG [ ]OGT/ NGT   Last BM:  5/15/21    GENITOURINARY:  [ ]Normal [ X] Incontinent   [ ]Oliguria/Anuria   [X ]Qureshi  MUSCULOSKELETAL:   [ ]Normal   [X]Weakness  [X ]Bed/Wheelchair bound [ ]Edema  NEUROLOGIC:   [ ]No focal deficits  [ X] Cognitive impairment  [ ] Dysphagia [ ]Dysarthria [ ] Paresis [ ]Other   SKIN:   [ ]Normal  [ ]Rash     [ X]Pressure ulcer(s)  [X ]y [ ]n  Present on admission  Sacrum stage 2, Coccyx stage 2 and left first toe unstageable. Please see nursing assessment for further details     CRITICAL CARE:  [ ] Shock Present  [ ]Septic [ ]Cardiogenic [ ]Neurologic [ ]Hypovolemic  [ ]  Vasopressors [ ]  Inotropes   [ ] Respiratory failure present [ ] Mechanical Ventilation [ ] Non-invasive ventilatory support [ ] High-Flow  [ ] Acute  [ ] Chronic [ ] Hypoxic  [ ] Hypercarbic [ ] Other  [X ] Other organ failure Brain-dementia     LABS:                        12.1   4.65  )-----------( 162      ( 17 May 2021 09:11 )             39.4   05-17    145  |  116<H>  |  8   ----------------------------<  122<H>  3.8   |  22  |  0.42<L>    Ca    7.9<L>      17 May 2021 09:11  Phos  2.5     05-17  Mg     2.2     05-17    TPro  5.2<L>  /  Alb  2.0<L>  /  TBili  0.2  /  DBili  <0.1  /  AST  17  /  ALT  10  /  AlkPhos  113  05-16        RADIOLOGY & ADDITIONAL STUDIES: Reviewed.     PROTEIN CALORIE MALNUTRITION: [ ] mild [ ] moderate [ ] severe  [ ] underweight [ ] morbid obesity    https://www.andeal.org/vault/2440/web/files/ONC/Table_Clinical%20Characteristics%20to%20Document%20Malnutrition-White%20JV%20et%20al%202012.pdf    Height (cm): 154.9 (05-12-21 @ 13:09), 160 (02-22-21 @ 16:10)  Weight (kg): 48.2 (05-12-21 @ 13:09), 47.2 (02-22-21 @ 16:10)  BMI (kg/m2): 20.1 (05-12-21 @ 13:09), 18.4 (02-22-21 @ 16:10)    [X ] PPSV2 < or = 30% [ ] significant weight loss [X ] poor nutritional intake [ ] anasarca Albumin, Serum: 2.0 g/dL (05-16-21 @ 07:03)    Artificial Nutrition [ ]     REFERRALS:   [ ]Chaplaincy  [ X] Hospice  [ ]Child Life  [X ]Social Work  [ ]Case management [ ]Holistic Therapy [ ] Physical Therapy [ ] Dietary   Goals of Care Document:

## 2021-05-17 NOTE — PROGRESS NOTE ADULT - PROBLEM SELECTOR PLAN 7
patient is DNR/DNI with a focus on comfort -discussed with grandson, discharge to facility with hospice  -hospice eval 5/17  -hospice med rec on going.

## 2021-05-17 NOTE — PROGRESS NOTE ADULT - PROBLEM SELECTOR PLAN 2
Patient with multiple pressure ulcers. Please see nursing assessment for further details.   Turn and position q2hrs  Dressing change PRN

## 2021-05-17 NOTE — PROGRESS NOTE ADULT - PROBLEM SELECTOR PLAN 4
Palliative Care Attending spoke with the patient's grandson Dr. Reyes.   Questions answered.  Emotional support provided.    Patient is appropriate for inpatient hospice  Ongoing discharge planning with the .  Will continue to co-mange with the medicine team Palliative Care Attending spoke with the patient's grandson, Dr. Reyes.   Questions answered.  Emotional support provided.    Patient is appropriate for inpatient hospice that is justified since the patient is not able to take PO and she need IV AED.   Ongoing discharge planning with the .  Will continue to co-mange with the medicine team

## 2021-05-17 NOTE — PROGRESS NOTE ADULT - PROBLEM SELECTOR PLAN 1
W/ acute metabolic encephalopathy. Multifactorial.   hypernatremia from dehydration contributing now resolved   ?LE wound infection s/p abx x 5 day course  no significant improvement in mental status  CT head negative   CT chest showing mucus plugging and collapse of left lung. Patient with poor cough effort. Pulmonary toilet as able.   LE ulcers - wound care

## 2021-05-18 NOTE — PROGRESS NOTE ADULT - PROBLEM SELECTOR PLAN 7
patient is DNR/DNI with a focus on comfort per family GOC.   hospice evaluation on going.  family deciding on facility.

## 2021-05-18 NOTE — PROGRESS NOTE ADULT - PROBLEM SELECTOR PLAN 4
c/w fosphenytoin, keppra (changed to iv dosing as patient unable to take po).  will see if patient may be able to take crushed antiepileptics.   these should be continued as seizure can produce discomfort.

## 2021-05-18 NOTE — PROGRESS NOTE ADULT - SUBJECTIVE AND OBJECTIVE BOX
GAP TEAM PALLIATIVE CARE UNIT PROGRESS NOTE:      [  ] Patient on hospice program.    INDICATION FOR PALLIATIVE CARE UNIT SERVICES:     INTERVAL HPI/OVERNIGHT EVENTS: The patient is seen and examined at the bedside. The patient is more alert this morning and does not appear to be in any pain or discomfort.    DNR on chart: Yes    Yes      Allergies    penicillin (Rash)    Intolerances    MEDICATIONS  (STANDING):  acetylcysteine 20% for bronchoscopy 4 milliLiter(s) Nebulizer <User Schedule>  fosphenytoin IVPB 100 milliGRAM(s) PE IV Intermittent every 8 hours  levETIRAcetam  IVPB 750 milliGRAM(s) IV Intermittent every 12 hours    MEDICATIONS  (PRN):    ITEMS UNCHECKED ARE NOT PRESENT    PRESENT SYMPTOMS: [ X]Unable to obtain due to poor mentation   Source if other than patient:  [ ]Family   [ X]Team     Pain: [ ] yes [ X] no please see pain ad score  QOL impact -   Location -                    Aggravating factors -  Quality -  Radiation -  Timing-  Severity (0-10 scale):  Minimal acceptable level (0-10 scale):     Dyspnea:                           [ ]Mild [ ]Moderate [ ]Severe  Anxiety:                             [ ]Mild [ ]Moderate [ ]Severe  Fatigue:                             [ ]Mild [ ]Moderate [ ]Severe  Nausea:                             [ ]Mild [ ]Moderate [ ]Severe  Loss of appetite:              [ ]Mild [ ]Moderate [ ]Severe  Constipation:                    [ ]Mild [ ]Moderate [ ]Severe    PAINAD Score: 0    [email or web address] (Ctrl +  left click to view)  		  Other Symptoms:  [X ]All other review of systems negative     Palliative Performance Status Version 2:  20%         http://npcrc.org/files/news/palliative_performance_scale_ppsv2.pdf  PHYSICAL EXAM:  Vital Signs Last 24 Hrs  T(C): 36.7 (18 May 2021 09:06), Max: 36.7 (18 May 2021 09:06)  T(F): 98.1 (18 May 2021 09:06), Max: 98.1 (18 May 2021 09:06)  HR: 87 (18 May 2021 09:06) (78 - 87)  BP: 118/73 (18 May 2021 09:06) (118/73 - 126/68)  BP(mean): --  RR: 18 (18 May 2021 09:06) (18 - 18)  SpO2: 98% (18 May 2021 09:06) (98% - 98%) I&O's Summary    17 May 2021 07:01  -  18 May 2021 07:00  --------------------------------------------------------  IN: 0 mL / OUT: 150 mL / NET: -150 mL    GENERAL:  [X ]Alert  [ ]Oriented x   [ ]Lethargic  [ ]Cachexia  [ ]Unarousable  [ X]Verbal- spontenous[ ]Non-Verbal  Behavioral:   [ ] Anxiety  [ ] Delirium [ ] Agitation [ ] Other  HEENT:  [X ]Normal   [ ]Dry mouth   [ ]ET Tube/Trach  [ ]Oral lesions  PULMONARY:   [ ]Clear [ ]Tachypnea  [ ]Audible excessive secretions   [ ]Rhonchi        [ ]Right [ ]Left [ ]Bilateral  [ ]Crackles        [ ]Right [ ]Left [ ]Bilateral  [ ]Wheezing     [ ]Right [ ]Left [ ]Bilateral  [X ]Diminshed BS [ ]Right [ ]Left [ X]Bilateral    CARDIOVASCULAR:    [X ]Regular [ ]Irregular [ ]Tachy  [ ]Jerry [ ]Murmur [ ]Other  GASTROINTESTINAL:  [ X]Soft  [ ]Distended   [ X]+BS  [X ]Non tender [ ]Tender  [ ]PEG [ ]OGT/ NGT   Last BM:  5/15/21    GENITOURINARY:  [ ]Normal [ X] Incontinent   [ ]Oliguria/Anuria   [x ]Qureshi  MUSCULOSKELETAL:   [ ]Normal   [ X]Weakness  [ X]Bed/Wheelchair bound [ ]Edema  NEUROLOGIC:   [ ]No focal deficits  [X ] Cognitive impairment  [ ] Dysphagia [ ]Dysarthria [ ] Paresis [ ]Other   SKIN:   [ ]Normal  [ ]Rash     [ X]Pressure ulcer(s)  [X ]y [ ]n  Present on admission  Sacrum stage 2, Coccyx stage 2 and left first toe unstageable. Please see nursing assessment for further details     CRITICAL CARE:  [ ] Shock Present  [ ]Septic [ ]Cardiogenic [ ]Neurologic [ ]Hypovolemic  [ ]  Vasopressors [ ]  Inotropes   [ ] Respiratory failure present [ ] Mechanical Ventilation [ ] Non-invasive ventilatory support [ ] High-Flow  [ ] Acute  [ ] Chronic [ ] Hypoxic  [ ] Hypercarbic [ ] Other  [X ] Other organ failure- Brain-dementia      LABS:                        12.1   4.65  )-----------( 162      ( 17 May 2021 09:11 )             39.4   05-17    145  |  116<H>  |  8   ----------------------------<  122<H>  3.8   |  22  |  0.42<L>    Ca    7.9<L>      17 May 2021 09:11  Phos  2.5     05-17  Mg     2.2     05-17        RADIOLOGY & ADDITIONAL STUDIES:    PROTEIN CALORIE MALNUTRITION: [ ] mild [ ] moderate [ ] severe  [ ] underweight [ ] morbid obesity    https://www.andeal.org/vault/2440/web/files/ONC/Table_Clinical%20Characteristics%20to%20Document%20Malnutrition-White%20JV%20et%20al%202012.pdf    Height (cm): 154.9 (05-12-21 @ 13:09), 160 (02-22-21 @ 16:10)  Weight (kg): 48.2 (05-12-21 @ 13:09), 47.2 (02-22-21 @ 16:10)  BMI (kg/m2): 20.1 (05-12-21 @ 13:09), 18.4 (02-22-21 @ 16:10)    [ X] PPSV2 < or = 30% [ ] significant weight loss [X ] poor nutritional intake [ ] anasarca Albumin, Serum: 2.0 g/dL (05-16-21 @ 07:03)    Artificial Nutrition [ ]     REFERRALS:   [X ]Chaplaincy  [X ] Hospice  [ ]Child Life  [ X]Social Work  [ ]Case management [ ]Holistic Therapy [ ] Physical Therapy [ ] Dietary   Goals of Care Document:    GAP TEAM PALLIATIVE CARE UNIT PROGRESS NOTE:      [  ] Patient on hospice program.    INDICATION FOR PALLIATIVE CARE UNIT SERVICES:     INTERVAL HPI/OVERNIGHT EVENTS: The patient is seen and examined at the bedside. The patient was slightly more alert this morning and did not appear to be in any pain or discomfort.    DNR on chart: Yes    Yes      Allergies    penicillin (Rash)    Intolerances    MEDICATIONS  (STANDING):  acetylcysteine 20% for bronchoscopy 4 milliLiter(s) Nebulizer <User Schedule>  fosphenytoin IVPB 100 milliGRAM(s) PE IV Intermittent every 8 hours  levETIRAcetam  IVPB 750 milliGRAM(s) IV Intermittent every 12 hours    MEDICATIONS  (PRN):    ITEMS UNCHECKED ARE NOT PRESENT    PRESENT SYMPTOMS: [ X]Unable to obtain due to poor mentation   Source if other than patient:  [ ]Family   [ X]Team     Pain: [ ] yes [ X] no please see pain ad score  QOL impact -   Location -                    Aggravating factors -  Quality -  Radiation -  Timing-  Severity (0-10 scale):  Minimal acceptable level (0-10 scale):     Dyspnea:                           [ ]Mild [ ]Moderate [ ]Severe  Anxiety:                             [ ]Mild [ ]Moderate [ ]Severe  Fatigue:                             [ ]Mild [ ]Moderate [ ]Severe  Nausea:                             [ ]Mild [ ]Moderate [ ]Severe  Loss of appetite:              [ ]Mild [ ]Moderate [ ]Severe  Constipation:                    [ ]Mild [ ]Moderate [ ]Severe    PAINAD Score: 0    [email or web address] (Ctrl +  left click to view)  		  Other Symptoms:  [X ]All other review of systems negative     Palliative Performance Status Version 2:  20%         http://npcrc.org/files/news/palliative_performance_scale_ppsv2.pdf  PHYSICAL EXAM:  Vital Signs Last 24 Hrs  T(C): 36.7 (18 May 2021 09:06), Max: 36.7 (18 May 2021 09:06)  T(F): 98.1 (18 May 2021 09:06), Max: 98.1 (18 May 2021 09:06)  HR: 87 (18 May 2021 09:06) (78 - 87)  BP: 118/73 (18 May 2021 09:06) (118/73 - 126/68)  BP(mean): --  RR: 18 (18 May 2021 09:06) (18 - 18)  SpO2: 98% (18 May 2021 09:06) (98% - 98%) I&O's Summary    17 May 2021 07:01  -  18 May 2021 07:00  --------------------------------------------------------  IN: 0 mL / OUT: 150 mL / NET: -150 mL    GENERAL:  [X ]Alert  [ ]Oriented x   [ ]Lethargic  [ ]Cachexia  [ ]Unarousable  [ X]Verbal- spontenous[ ]Non-Verbal  Behavioral:   [ ] Anxiety  [ ] Delirium [ ] Agitation [ ] Other  HEENT:  [X ]Normal   [ ]Dry mouth   [ ]ET Tube/Trach  [ ]Oral lesions  PULMONARY:   [ ]Clear [ ]Tachypnea  [ ]Audible excessive secretions   [ ]Rhonchi        [ ]Right [ ]Left [ ]Bilateral  [ ]Crackles        [ ]Right [ ]Left [ ]Bilateral  [ ]Wheezing     [ ]Right [ ]Left [ ]Bilateral  [X ]Diminshed BS [ ]Right [ ]Left [ X]Bilateral    CARDIOVASCULAR:    [X ]Regular [ ]Irregular [ ]Tachy  [ ]Jerry [ ]Murmur [ ]Other  GASTROINTESTINAL:  [ X]Soft  [ ]Distended   [ X]+BS  [X ]Non tender [ ]Tender  [ ]PEG [ ]OGT/ NGT   Last BM:  5/15/21    GENITOURINARY:  [ ]Normal [ X] Incontinent   [ ]Oliguria/Anuria   [x ]Qureshi  MUSCULOSKELETAL:   [ ]Normal   [ X]Weakness  [ X]Bed/Wheelchair bound [ ]Edema  NEUROLOGIC:   [ ]No focal deficits  [X ] Cognitive impairment  [ ] Dysphagia [ ]Dysarthria [ ] Paresis [ ]Other   SKIN:   [ ]Normal  [ ]Rash     [ X]Pressure ulcer(s)  [X ]y [ ]n  Present on admission  Sacrum stage 2, Coccyx stage 2 and left first toe unstageable. Please see nursing assessment for further details     CRITICAL CARE:  [ ] Shock Present  [ ]Septic [ ]Cardiogenic [ ]Neurologic [ ]Hypovolemic  [ ]  Vasopressors [ ]  Inotropes   [ ] Respiratory failure present [ ] Mechanical Ventilation [ ] Non-invasive ventilatory support [ ] High-Flow  [ ] Acute  [ ] Chronic [ ] Hypoxic  [ ] Hypercarbic [ ] Other  [X ] Other organ failure- Brain-dementia      LABS:                        12.1   4.65  )-----------( 162      ( 17 May 2021 09:11 )             39.4   05-17    145  |  116<H>  |  8   ----------------------------<  122<H>  3.8   |  22  |  0.42<L>    Ca    7.9<L>      17 May 2021 09:11  Phos  2.5     05-17  Mg     2.2     05-17        RADIOLOGY & ADDITIONAL STUDIES: no new studies.     PROTEIN CALORIE MALNUTRITION: [ ] mild [ ] moderate [ ] severe  [ ] underweight [ ] morbid obesity    https://www.andeal.org/vault/2440/web/files/ONC/Table_Clinical%20Characteristics%20to%20Document%20Malnutrition-White%20JV%20et%20al%202012.pdf    Height (cm): 154.9 (05-12-21 @ 13:09), 160 (02-22-21 @ 16:10)  Weight (kg): 48.2 (05-12-21 @ 13:09), 47.2 (02-22-21 @ 16:10)  BMI (kg/m2): 20.1 (05-12-21 @ 13:09), 18.4 (02-22-21 @ 16:10)    [ X] PPSV2 < or = 30% [ ] significant weight loss [X ] poor nutritional intake [ ] anasarca Albumin, Serum: 2.0 g/dL (05-16-21 @ 07:03)    Artificial Nutrition [ ]     REFERRALS:   [X ]Chaplaincy  [X ] Hospice  [ ]Child Life  [ X]Social Work  [ ]Case management [ ]Holistic Therapy [ ] Physical Therapy [ ] Dietary   Goals of Care Document:

## 2021-05-18 NOTE — PROGRESS NOTE ADULT - SUBJECTIVE AND OBJECTIVE BOX
HOSPITALIST NOTE    Dr. Rai Hernandez DO  Attending Physician  Division of Hospital Medicine  [facility]  Pager:  [phone]      SUBJECTIVE and ROS limited by dementia/delirium.      PAST MEDICAL & SURGICAL HISTORY:  SDH (subdural hematoma)    Atrial fibrillation    Dementia    Diabetes    HTN (hypertension)    Cerebrovascular accident (CVA)    Seizure    No significant past surgical history        MEDICATIONS  (STANDING):  acetylcysteine 20% for bronchoscopy 4 milliLiter(s) Nebulizer <User Schedule>  fosphenytoin IVPB 100 milliGRAM(s) PE IV Intermittent every 8 hours  levETIRAcetam  IVPB 750 milliGRAM(s) IV Intermittent every 12 hours    MEDICATIONS  (PRN):      Allergies    penicillin (Rash)    Intolerances        T(C): 36.7 (05-18-21 @ 09:06), Max: 36.7 (05-18-21 @ 09:06)  T(F): 98.1 (05-18-21 @ 09:06), Max: 98.1 (05-18-21 @ 09:06)  HR: 87 (05-18-21 @ 09:06) (78 - 87)  BP: 118/73 (05-18-21 @ 09:06) (118/73 - 126/68)  ABP: --  ABP(mean): --  RR: 18 (05-18-21 @ 09:06) (18 - 18)  SpO2: 98% (05-18-21 @ 09:06) (98% - 98%)    CONSTITUTIONAL: No acute distress.    Cardiovascular: RRR with no murmurs. No JVD noted. No lower extremity edema B/L. Extremities are warm and well perfused.     Respiratory: Dec bs of left lung fields. No wrr. No accessory muscle use.   Gastrointestinal:  Soft, nontender. Non-distended. Non-rigid. No CVA tenderness B/L.  Neurologic:  Awake. Oriented x0. Not following commands.   SKIN: As noted prior:  2cm ulcer at left metatarsal joint with mild surrounding erythema, no drainage, no significant warmth, small ulcers on right 5th toe with mild surrounding erythema no drainage.   Psych:  Normal affect. Normal Mood.     LABS                        12.1   4.65  )-----------( 162      ( 17 May 2021 09:11 )             39.4     05-17    145  |  116<H>  |  8   ----------------------------<  122<H>  3.8   |  22  |  0.42<L>    Ca    7.9<L>      17 May 2021 09:11  Phos  2.5     05-17  Mg     2.2     05-17            ADDITIONAL STUDIES PERSONALLY REVIEWED    Our team discussed the case with all consults    All consultant contribution to care greatly appreciated.

## 2021-05-18 NOTE — PROGRESS NOTE ADULT - PROBLEM SELECTOR PLAN 5
Ongoing discharge planning with the  and considering NH with hospice.   Will continue to co-mange with the medicine team

## 2021-05-18 NOTE — PROGRESS NOTE ADULT - PROBLEM SELECTOR PLAN 3
Patient requires total support for all ADL's.  PPSV2 20   %. Patient with multiple pressure ulcers. Please see nursing assessment for further details.   Turn and position q2hrs  Dressing change PRN

## 2021-05-18 NOTE — PROGRESS NOTE ADULT - PROBLEM SELECTOR PLAN 4
Ongoing discharge planning with the .  Will continue to co-mange with the medicine team Patient requires total support for all ADL's.  PPSV2 20   %.

## 2021-05-18 NOTE — PROGRESS NOTE ADULT - PROBLEM SELECTOR PLAN 1
Fast 7 C  supportive care Not able to tolerate PO and therefore inpatient appropriate for IV Keppra and Cerbyx. However, will need to confirm with [facility] they can provide these medications at the NH. Otherwise, will need to consult Epilepsy for PO alternatives (e.g., Clonazepam ODT)

## 2021-05-18 NOTE — PROGRESS NOTE ADULT - PROBLEM SELECTOR PLAN 2
Patient with multiple pressure ulcers. Please see nursing assessment for further details.   Turn and position q2hrs  Dressing change PRN Fast 7 C and therefore hospice eligible.   supportive care

## 2021-05-19 NOTE — PROGRESS NOTE ADULT - PROBLEM SELECTOR PLAN 1
Report by the primary RN that patient has occasional pain based on her physical exam especially with movement.   The patient has multiple pressure ulcer  Morphine 1mg IV q1hr  PRN ordered   Consider premedicating with position change  Bowel regimen while on opioids

## 2021-05-19 NOTE — CHART NOTE - NSCHARTNOTEFT_GEN_A_CORE
Patient seen and examined at the bedside. No S&S of discomfort noted  Ongoing discharge planning with the . Plan is for Inpatient hospice. Palliative will continue to follow   In the event of newly developing, evolving, or worsening symptoms, please contact the Palliative Medicine team via pager  #[phone]. The Geriatric and Palliative Medicine service has coverage 24 hours a day/ 7 days a week to provide medical recommendations regarding symptom management needs via telephone.

## 2021-05-19 NOTE — PROGRESS NOTE ADULT - PROBLEM SELECTOR PLAN 2
Not able to tolerate PO and therefore inpatient appropriate for IV Keppra and Cerbyx.   can not provide these medications IV at the NH. The team consulted Epilepsy for alternatives

## 2021-05-19 NOTE — PROGRESS NOTE ADULT - PROBLEM SELECTOR PLAN 6
Ongoing discharge planning with the . The epilepsy team was consulted for alternative medication for IV Keppra and Cerbyx as patient can not tolerate PO meds.  Will continue to co-mange with the medicine team

## 2021-05-19 NOTE — CONSULT NOTE ADULT - SUBJECTIVE AND OBJECTIVE BOX
HPI:  Patient is a 89yo F with pmhx of dementia, DM2 (not on insulin), afib (not on a/c), traumatic SDH c/b seizures admitted AMS due to severe hypernatremia from dehydration with positive LE ulcer infection treated with empiric abx.  Neurology consulted for AED management. Collateral per primary team, patient with failure to thrive and qualified for comfort care/ hospice. However accepting facility is unable to manage current IV AED regimen. Patient has poor oral intake at this time.       ROS: Patient unable to provide ROS     PAST MEDICAL & SURGICAL HISTORY:  SDH (subdural hematoma)    Atrial fibrillation    Dementia    Diabetes    HTN (hypertension)    Cerebrovascular accident (CVA)    Seizure    No significant past surgical history      FAMILY HISTORY:  FH: diabetes mellitus        SOCIAL HISTORY: SOCIAL HISTORY:     Marital Status: (  )   (  ) Single  (  )   (  )      Occupation:      Lives: (  ) alone  (  ) with children   (  ) with spouse  (  ) with parents  (  ) other     Illicit Drug Use: (  ) never used  (  ) other _____     Tobacco Use:  (  ) never smoked  (  ) former smoker  (  ) current smoker  (  ) pack year  (  ) last cigarette date     Alcohol Use:      Sexual History:        MEDICATIONS  Home Medications:  Dakins Half Strength 0.25% topical solution: Apply topically to affected area once a day and as needed (11 May 2021 18:13)  docusate sodium 100 mg oral capsule: 2 cap(s) orally once a day (at bedtime) (11 May 2021 18:13)  furosemide 20 mg oral tablet: 1 tab(s) orally once a day (11 May 2021 18:13)  levETIRAcetam 100 mg/mL oral solution: 7.5 milliliter(s) orally 2 times a day  (9am &amp; 9pm) (11 May 2021 18:13)  Metoprolol Tartrate 25 mg oral tablet: 1 tab(s) orally every 12 hours (11 May 2021 18:13)  Multiple Vitamins oral tablet: 1 tab(s) orally once a day (11 May 2021 18:13)  mupirocin 2% topical ointment: Apply topically to affected area once a day( 5th right toe) (11 May 2021 18:13)  NovoLOG FlexPen 100 units/mL injectable solution: inject by subcutaneous 3 times a day before meals  IF BS =    201- 250 = 2 units  251- 300 = 4 units  301- 350 = 6 units  351- 400 = 8 units    If greater than 400 or below 90  CALL MD (11 May 2021 18:13)  phenytoin 25 mg/mL oral suspension: 4 milliliter(s) orally every 8 hours  (6am,2pm,10pm) (11 May 2021 18:13)  Santyl 250 units/g topical ointment: Apply topically to affected area once a day and as needed (11 May 2021 18:13)  senna 8.6 mg oral tablet: 2 tab(s) orally once a day (at bedtime) (11 May 2021 18:13)  sodium chloride 0.9% intravenous solution: 50 milliliter(s) per hour intravenous 3 times a day for 3 days (11 May 2021 18:13)  Tylenol 325 mg oral tablet: 2 tab(s) orally every 6 hours, As Needed (11 May 2021 18:13)  Tylenol 325 mg oral tablet: 2 tab(s) orally once a day 30 minutes prior dressing change (11 May 2021 18:13)      MEDICATIONS  (STANDING):  acetylcysteine 20% for bronchoscopy 4 milliLiter(s) Nebulizer <User Schedule>  fosphenytoin IVPB 100 milliGRAM(s) PE IV Intermittent every 8 hours  levETIRAcetam  IVPB 750 milliGRAM(s) IV Intermittent every 12 hours    MEDICATIONS  (PRN):  morphine  - Injectable 1 milliGRAM(s) IV Push every 1 hour PRN pain      ALLERGIES/INTOLERANCES:  Allergies  penicillin (Rash)    Intolerances      OBJECTIVE:  VITALS   Vital Signs Last 24 Hrs  T(C): 36.4 (19 May 2021 08:39), Max: 36.4 (19 May 2021 08:39)  T(F): 97.5 (19 May 2021 08:39), Max: 97.5 (19 May 2021 08:39)  HR: 108 (19 May 2021 08:39) (108 - 108)  BP: 111/70 (19 May 2021 08:39) (111/70 - 111/70)  BP(mean): --  RR: 18 (19 May 2021 08:39) (18 - 18)  SpO2: 93% (19 May 2021 08:39) (93% - 96%)    PHYSICAL EXAM:  Neurological Exam:  Mental Status: Awake, eyes open, non-verbal. grunting sounds only. Does not track.   Cranial Nerves: No BTT b/l . No facial asymmetry.    Motor: No spontaneous movements of extremities         Dysmetria: Unable to assess  Tremor: No resting  Sensation: Grossly intact to painful stimuli   Deep Tendon Reflexes: 2+ R biceps, triceps, brachioradialis, 1+ L biceps, triceps, brachioradialis . Mute knee and ankle  Toes mute bilaterally  Gait: Deferred     LABORATORY:  CBC   Chem       LFTs   Coagulopathy   Lipid Panel   A1c   Cardiac enzymes     U/A   CSF  Immunological  Other    STUDIES & IMAGING:  Studies (EKG, EEG, EMG, etc):     Radiology (XR, CT, MR, U/S, TTE/CELSO):

## 2021-05-19 NOTE — PROGRESS NOTE ADULT - PROBLEM SELECTOR PLAN 4
c/w fosphenytoin, keppra (changed to iv dosing as patient unable to take po).  if unable to take po, it's reasonable to use SL benzo for seizure prevention.

## 2021-05-19 NOTE — PROGRESS NOTE ADULT - PROBLEM SELECTOR PLAN 6
monitor HR off BB. If HR up-trends, we may or may not resume depending on assessed comfort level.   not candidate for a/c.  reducing polypharmacy for comfort.

## 2021-05-19 NOTE — PROGRESS NOTE ADULT - SUBJECTIVE AND OBJECTIVE BOX
GAP TEAM PALLIATIVE CARE UNIT PROGRESS NOTE:      [  ] Patient on hospice program.    INDICATION FOR PALLIATIVE CARE UNIT SERVICES:    INTERVAL HPI/OVERNIGHT EVENTS: The patient is seen and examined at the bedside. The patient did not appear to be in any pain or discomfort.    DNR on chart: Yes    Yes      Allergies    penicillin (Rash)    Intolerances    MEDICATIONS  (STANDING):  acetylcysteine 20% for bronchoscopy 4 milliLiter(s) Nebulizer <User Schedule>  fosphenytoin IVPB 100 milliGRAM(s) PE IV Intermittent every 8 hours  levETIRAcetam  IVPB 750 milliGRAM(s) IV Intermittent every 12 hours    MEDICATIONS  (PRN):  morphine  - Injectable 1 milliGRAM(s) IV Push every 1 hour PRN pain    ITEMS UNCHECKED ARE NOT PRESENT    PRESENT SYMPTOMS: [X ]Unable to obtain due to poor mentation   Source if other than patient:  [ ]Family   [ X]Team     Pain: [ ] yes [ X] no see pain ad score  QOL impact -   Location -                    Aggravating factors -  Quality -  Radiation -  Timing-  Severity (0-10 scale):  Minimal acceptable level (0-10 scale):     Dyspnea:                           [ ]Mild [ ]Moderate [ ]Severe  Anxiety:                             [ ]Mild [ ]Moderate [ ]Severe  Fatigue:                             [ ]Mild [ ]Moderate [ ]Severe  Nausea:                             [ ]Mild [ ]Moderate [ ]Severe  Loss of appetite:              [ ]Mild [ ]Moderate [ ]Severe  Constipation:                    [ ]Mild [ ]Moderate [ ]Severe    PAINAD Score: 0    [email or web address].[facility]/cog/[email or web address] (Ctrl +  left click to view)  		  Other Symptoms:  [X ]All other review of systems negative     Palliative Performance Status Version 2:  20%         http://npcrc.org/files/news/palliative_performance_scale_ppsv2.pdf  PHYSICAL EXAM:  Vital Signs Last 24 Hrs  T(C): 36.4 (19 May 2021 08:39), Max: 36.4 (19 May 2021 08:39)  T(F): 97.5 (19 May 2021 08:39), Max: 97.5 (19 May 2021 08:39)  HR: 108 (19 May 2021 08:39) (108 - 108)  BP: 111/70 (19 May 2021 08:39) (111/70 - 111/70)  BP(mean): --  RR: 18 (19 May 2021 08:39) (18 - 18)  SpO2: 93% (19 May 2021 08:39) (93% - 96%) I&O's Summary    18 May 2021 07:01  -  19 May 2021 07:00  --------------------------------------------------------  IN: 0 mL / OUT: 550 mL / NET: -550 mL    GENERAL:  [X ]Alert  [ ]Oriented x   [ ]Lethargic  [ ]Cachexia  [ ]Unarousable  [ X]Verbal- spontaneous [ ]Non-Verbal  Behavioral:   [ ] Anxiety  [ ] Delirium [ ] Agitation [ ] Other  HEENT:  [X ]Normal   [ ]Dry mouth   [ ]ET Tube/Trach  [ ]Oral lesions  PULMONARY:   [ ]Clear [ ]Tachypnea  [ ]Audible excessive secretions   [ ]Rhonchi        [ ]Right [ ]Left [ ]Bilateral  [ ]Crackles        [ ]Right [ ]Left [ ]Bilateral  [ ]Wheezing     [ ]Right [ ]Left [ ]Bilateral  [X ]Diminshed BS [ ]Right [ ]Left [ X]Bilateral    CARDIOVASCULAR:    [X ]Regular [ ]Irregular [ ]Tachy  [ ]Jerry [ ]Murmur [ ]Other  GASTROINTESTINAL:  [ X]Soft  [ ]Distended   [ X]+BS  [X ]Non tender [ ]Tender  [ ]PEG [ ]OGT/ NGT   Last BM:  5/18/21    GENITOURINARY:  [ ]Normal [ X] Incontinent   [ ]Oliguria/Anuria   [x ]Qureshi  MUSCULOSKELETAL:   [ ]Normal   [ X]Weakness  [ X]Bed/Wheelchair bound [ ]Edema  NEUROLOGIC:   [ ]No focal deficits  [X ] Cognitive impairment  [ ] Dysphagia [ ]Dysarthria [ ] Paresis [ ]Other   SKIN:   [ ]Normal  [ ]Rash     [ X]Pressure ulcer(s)  [X ]y [ ]n  Present on admission  Sacrum stage 2, Coccyx stage 2 and left first toe unstageable. Please see nursing assessment for further details     CRITICAL CARE:  [ ] Shock Present  [ ]Septic [ ]Cardiogenic [ ]Neurologic [ ]Hypovolemic  [ ]  Vasopressors [ ]  Inotropes   [ ] Respiratory failure present [ ] Mechanical Ventilation [ ] Non-invasive ventilatory support [ ] High-Flow  [ ] Acute  [ ] Chronic [ ] Hypoxic  [ ] Hypercarbic [ ] Other  [X ] Other organ failure- Brain-dementia        LABS: None new      RADIOLOGY & ADDITIONAL STUDIES: None new    PROTEIN CALORIE MALNUTRITION: [ ] mild [ ] moderate [ ] severe  [ ] underweight [ ] morbid obesity    https://www.andeal.org/vault/2440/web/files/ONC/Table_Clinical%20Characteristics%20to%20Document%20Malnutrition-White%20JV%20et%20al%266893.pdf    Height (cm): 154.9 (05-12-21 @ 13:09), 160 (02-22-21 @ 16:10)  Weight (kg): 48.2 (05-12-21 @ 13:09), 47.2 (02-22-21 @ 16:10)  BMI (kg/m2): 20.1 (05-12-21 @ 13:09), 18.4 (02-22-21 @ 16:10)    [X ] PPSV2 < or = 30% [ ] significant weight loss [ X] poor nutritional intake [ ] anasarca Albumin, Serum: 2.0 g/dL (05-16-21 @ 07:03)    Artificial Nutrition [ ]     REFERRALS:   [ ]Chaplaincy  [ ] Hospice  [ ]Child Life  [ ]Social Work  [ ]Case management [ ]Holistic Therapy [ ] Physical Therapy [ ] Dietary   Goals of Care Document:

## 2021-05-19 NOTE — CONSULT NOTE ADULT - ASSESSMENT
Patient is a 89yo F with pmhx of dementia, DM2 (not on insulin), afib (not on a/c), traumatic SDH c/b seizures admitted AMS due to severe hypernatremia from dehydration with positive LE ulcer infection treated with empiric abx.  Neurology consulted for AED management as accepting facility unable to manage current IV regimen. Patient with poor PO intake. Neuro exam as above.     Recommendations  Patient is a 89yo F with pmhx of dementia, DM2 (not on insulin), afib (not on a/c), traumatic SDH c/b seizures admitted AMS due to severe hypernatremia from dehydration with positive LE ulcer infection treated with empiric abx.  Neurology consulted for AED management as accepting facility unable to manage current IV regimen. Patient with poor PO intake. Neuro exam as above. Discussed with Palliative attending Dr. Potts about a comfortable regimen that would be acceptable to the family.     Recommendations   - Clonazepam ODT 1mg BID   - Ativan 1mg IV prn seizure activity lasting > 2min. If accepting facility unable to do IV ativan can consider Diastat rectal   - Rest of care per primary team     Case discussed with and patient to be seen by neurology attending Dr. Frederick

## 2021-05-19 NOTE — CONSULT NOTE ADULT - CONSULT REQUESTED BY NAME
"Client presents for admission to [facility].  Client begins vomiting during admission process.  Staff is noted to be primarily clear liquid.  Staff notify writer to assess client.  Client states she has not used Meth in 7 days and has been experiencing sweating.  Client states she \"always\" has a stomach ache.  Client states she has had frequent nausea but this is the first instance of vomiting experienced.  Client states she had fever \"close to 100 yesterday\" that was measured by aunt through hand sensation.  Client states temperature was not taken yesterday with any instrumentation but \"aunt is a mom.\"    Client completes an instant UA at 1045.  Instant UA is positive for THC, MOP, and MTD.  Client states that she \"took methadone yesterday when I had the sweats my aunt gave me a little bit.  I didn't use it to get high.\"    " Palliative Care

## 2021-05-19 NOTE — PROGRESS NOTE ADULT - SUBJECTIVE AND OBJECTIVE BOX
HOSPITALIST NOTE    Dr. Rai Hernandez DO  Attending Physician  Division of Hospital Medicine  [facility]  Pager:  [phone]    SUBJECTIVE and ROS limited by dementia/delirium.    PAST MEDICAL & SURGICAL HISTORY:  SDH (subdural hematoma)    Atrial fibrillation    Dementia    Diabetes    HTN (hypertension)    Cerebrovascular accident (CVA)    Seizure    No significant past surgical history        MEDICATIONS  (STANDING):  acetylcysteine 20% for bronchoscopy 4 milliLiter(s) Nebulizer <User Schedule>  fosphenytoin IVPB 100 milliGRAM(s) PE IV Intermittent every 8 hours  levETIRAcetam  IVPB 750 milliGRAM(s) IV Intermittent every 12 hours    MEDICATIONS  (PRN):      Allergies    penicillin (Rash)    Intolerances        T(C): 36.4 (05-19-21 @ 08:39), Max: 36.4 (05-19-21 @ 08:39)  T(F): 97.5 (05-19-21 @ 08:39), Max: 97.5 (05-19-21 @ 08:39)  HR: 108 (05-19-21 @ 08:39) (108 - 108)  BP: 111/70 (05-19-21 @ 08:39) (111/70 - 111/70)  ABP: --  ABP(mean): --  RR: 18 (05-19-21 @ 08:39) (18 - 18)  SpO2: 93% (05-19-21 @ 08:39) (93% - 96%)      CONSTITUTIONAL: No acute distress.    Cardiovascular: RRR with no murmurs.  No lower extremity edema B/L. Extremities are warm and well perfused.     Respiratory: Dec bs of left lung fields. No wrr. No accessory muscle use.   Gastrointestinal:  Soft, nontender. Non-distended. Non-rigid.   Neurologic:  Awake. Oriented x0. Not following commands.   SKIN: As noted prior:  2cm ulcer at left metatarsal joint with mild surrounding erythema, no drainage, no significant warmth, small ulcers on right 5th toe with mild surrounding erythema no drainage.     LABS      ADDITIONAL STUDIES PERSONALLY REVIEWED    Our team discussed the case with all consults    All consultant contribution to care greatly appreciated.  HOSPITALIST NOTE    Dr. Rai Hernandez DO  Attending Physician  Division of Hospital Medicine  [facility]  Pager:  [phone]    SUBJECTIVE and ROS limited by dementia/delirium.    PAST MEDICAL & SURGICAL HISTORY:  SDH (subdural hematoma)    Atrial fibrillation    Dementia    Diabetes    HTN (hypertension)    Cerebrovascular accident (CVA)    Seizure    No significant past surgical history        MEDICATIONS  (STANDING):  acetylcysteine 20% for bronchoscopy 4 milliLiter(s) Nebulizer <User Schedule>  fosphenytoin IVPB 100 milliGRAM(s) PE IV Intermittent every 8 hours  levETIRAcetam  IVPB 750 milliGRAM(s) IV Intermittent every 12 hours    MEDICATIONS  (PRN):      Allergies    penicillin (Rash)    Intolerances        T(C): 36.4 (05-19-21 @ 08:39), Max: 36.4 (05-19-21 @ 08:39)  T(F): 97.5 (05-19-21 @ 08:39), Max: 97.5 (05-19-21 @ 08:39)  HR: 108 (05-19-21 @ 08:39) (108 - 108)  BP: 111/70 (05-19-21 @ 08:39) (111/70 - 111/70)  ABP: --  ABP(mean): --  RR: 18 (05-19-21 @ 08:39) (18 - 18)  SpO2: 93% (05-19-21 @ 08:39) (93% - 96%)      CONSTITUTIONAL: No acute distress.    Cardiovascular: RRR with no murmurs.  No lower extremity edema B/L. Extremities are warm and well perfused.     Respiratory: Dec bs of left lung fields. No wrr. No accessory muscle use.   Gastrointestinal:  Soft, nontender. Non-distended. Non-rigid.   Neurologic:  Awake. Oriented x0. Not following commands.   SKIN: As noted prior:  2cm ulcer at left metatarsal joint with mild surrounding erythema, no drainage, no significant warmth, small ulcers on right 5th toe with mild surrounding erythema no drainage.   : indwelling duque.    LABS      ADDITIONAL STUDIES PERSONALLY REVIEWED    Our team discussed the case with all consults    All consultant contribution to care greatly appreciated.

## 2021-05-19 NOTE — CONSULT NOTE ADULT - ATTENDING COMMENTS
Above noted   89 yo female , poorly responsive at time of visit  Evidence of weight loss  Found supine  at bedrest  Wounds as noted   offloaded  feet are not overtly ischemic, and are warm to touch  Continue use of offloading boots  remain available
89yo F with pmhx of dementia, DM2 (not on insulin), afib (not on a/c), traumatic SDH c/b seizures admitted with AMS and failure to thrive. Pt is actively in dying process. No seizures seen at bedside, pt unresponsive. Agree with symptomatic treatment and standing clonazepam.

## 2021-05-19 NOTE — PROGRESS NOTE ADULT - PROBLEM SELECTOR PLAN 1
W/ acute metabolic encephalopathy. Multifactorial.   hypernatremia from dehydration contributing now resolved   ?LE wound infection s/p abx x 5 day course  no significant improvement in mental status  CT head negative   CT chest showing mucus plugging and collapse of left lung. Patient with poor cough effort. Pulmonary toilet as able.   LE ulcers - wound care W/ acute metabolic encephalopathy. Multifactorial.   hypernatremia from dehydration contributing now resolved   ?LE wound infection s/p abx x 5 day course  no significant improvement in mental status  CT head negative   CT chest showing mucus plugging and collapse of left lung. Patient with poor cough effort. Pulmonary toilet as able.   LE ulcers - wound care  Indwelling duque for retention, skin protection and comfort.

## 2021-05-20 NOTE — PROGRESS NOTE ADULT - PROBLEM SELECTOR PLAN 5
Patient requires total support for all ADL's.  PPSV2 20   %. Fast 7 C and therefore hospice eligible.   supportive care

## 2021-05-20 NOTE — PROGRESS NOTE ADULT - PROBLEM SELECTOR PLAN 4
Patient with multiple pressure ulcers. Please see nursing assessment for further details.   Turn and position q2hrs  Dressing change PRN Not able to tolerate PO and therefore inpatient appropriate for IV Keppra and Cerbyx.  [facility] can not provide these medications IV at the NH.   Medications was changed overnight to Clonazepam oral disintegrating tabs 0.5mg BID. Primary RN reports that patient is not tolerating this med. Medications changed back to IV

## 2021-05-20 NOTE — PROGRESS NOTE ADULT - PROBLEM SELECTOR PLAN 4
Hospice facility unable to accept iv AED.   Neuro input greatly appreciated -- rec klonopin 0.5 mg ODT bid standing.   Ativan iv prn for seizure. Rectal diazepam can further be considered if needed.

## 2021-05-20 NOTE — PROGRESS NOTE ADULT - PROBLEM SELECTOR PLAN 1
W/ acute metabolic encephalopathy. Multifactorial.   hypernatremia from dehydration contributing now resolved   ?LE wound infection s/p abx x 5 day course  no significant improvement in mental status  CT head negative   CT chest showing mucus plugging and collapse of left lung. Patient with poor cough effort. Pulmonary toilet as able.   LE ulcers - wound care  Indwelling duque for retention, skin protection and comfort.

## 2021-05-20 NOTE — PROGRESS NOTE ADULT - PROBLEM SELECTOR PLAN 2
Not able to tolerate PO and therefore inpatient appropriate for IV Keppra and Cerbyx.  [facility] can not provide these medications IV at the NH.   Medications was changed overnight to Clonazepam oral disintegrating tabs 0.5mg BID. Primary RN reports that patient is not tolerating this med. Medications changed back to IV The patient has multiple pressure ulcer. She required PRN morphine 1mg IV X2 within a 24hr period (8am-8am)  Ordered Morphine 1mg IV q8hr ATC. Increased PRN Morphine to 1.5mg IV q1hr  Consider premedicating with position change  Bowel regimen while on opioids

## 2021-05-20 NOTE — PROGRESS NOTE ADULT - PROBLEM SELECTOR PLAN 6
Ongoing discharge planning with the .   Will continue to co-mange with the medicine team Patient with multiple pressure ulcers. Please see nursing assessment for further details.   Turn and position q2hrs  Dressing change PRN

## 2021-05-20 NOTE — PROGRESS NOTE ADULT - PROBLEM SELECTOR PLAN 7
patient is DNR/DNI with a focus on comfort per family GOC.   hospice dispo pending.   we greatly appreciate all faculty involved.

## 2021-05-20 NOTE — PROGRESS NOTE ADULT - SUBJECTIVE AND OBJECTIVE BOX
GAP TEAM PALLIATIVE CARE UNIT PROGRESS NOTE:      [  ] Patient on hospice program.    INDICATION FOR PALLIATIVE CARE UNIT SERVICES:    INTERVAL HPI/OVERNIGHT EVENTS:  The patient is seen and examined at the bedside. The patient required morphine 1mg IV X2 within the last 24hr (8am-8am).     DNR on chart: Yes    Yes      Allergies    penicillin (Rash)    Intolerances    MEDICATIONS  (STANDING):  acetylcysteine 20% for bronchoscopy 4 milliLiter(s) Nebulizer <User Schedule>  fosphenytoin IVPB 100 milliGRAM(s) PE IV Intermittent every 8 hours  levETIRAcetam  IVPB 750 milliGRAM(s) IV Intermittent every 12 hours  morphine  - Injectable 1 milliGRAM(s) IV Push every 8 hours    MEDICATIONS  (PRN):  bisacodyl Suppository 10 milliGRAM(s) Rectal daily PRN Constipation  LORazepam   Injectable 0.5 milliGRAM(s) IV Push every 6 hours PRN Agitation  morphine  - Injectable 1.5 milliGRAM(s) IV Push every 1 hour PRN pain    ITEMS UNCHECKED ARE NOT PRESENT    PRESENT SYMPTOMS: [X ]Unable to obtain due to poor mentation   Source if other than patient:  [ ]Family   [ X]Team     Pain: [ X] yes [ ] no see pain ad score  QOL impact -   Location -                    Aggravating factors -  Quality -  Radiation -  Timing-  Severity (0-10 scale):  Minimal acceptable level (0-10 scale):     Dyspnea:                           [ ]Mild [ ]Moderate [ ]Severe  Anxiety:                             [ ]Mild [ ]Moderate [ ]Severe  Fatigue:                             [ ]Mild [ ]Moderate [ ]Severe  Nausea:                             [ ]Mild [ ]Moderate [ ]Severe  Loss of appetite:              [ ]Mild [ ]Moderate [ ]Severe  Constipation:                    [ ]Mild [ ]Moderate [ ]Severe    PAINAD Score: 5,7 ,7 within the last 24hrs (8am-8am)    [email or web address] (Ctrl +  left click to view)  		  Other Symptoms:  [X ]All other review of systems negative     Palliative Performance Status Version 2:   20 %         http://npcrc.org/files/news/palliative_performance_scale_[email or web address].pdf  PHYSICAL EXAM:  Vital Signs Last 24 Hrs  T(C): 37.3 (20 May 2021 08:50), Max: 37.3 (20 May 2021 08:50)  T(F): 99.2 (20 May 2021 08:50), Max: 99.2 (20 May 2021 08:50)  HR: 102 (20 May 2021 08:50) (86 - 113)  BP: 112/64 (20 May 2021 08:50) (112/64 - 116/67)  BP(mean): --  RR: 18 (20 May 2021 08:50) (18 - 18)  SpO2: 94% (20 May 2021 08:50) (94% - 97%) I&O's Summary    19 May 2021 07:01  -  20 May 2021 07:00  --------------------------------------------------------  IN: 0 mL / OUT: 250 mL / NET: -250 mL    GENERAL:  [ ]Alert  [ ]Oriented x   [ X]Lethargic  [ ]Cachexia  [ ]Unarousable  [ ]Verbal- spontaneous [ ]Non-Verbal  Behavioral:   [ ] Anxiety  [ ] Delirium [ ] Agitation [ ] Other  HEENT:  [X ]Normal   [ ]Dry mouth   [ ]ET Tube/Trach  [ ]Oral lesions  PULMONARY:   [ ]Clear [ ]Tachypnea  [ X]Audible excessive secretions   [ ]Rhonchi        [ ]Right [ ]Left [ ]Bilateral  [ ]Crackles        [ ]Right [ ]Left [ ]Bilateral  [ ]Wheezing     [ ]Right [ ]Left [ ]Bilateral  [ ]Diminshed BS [ ]Right [ ]Left [ X]Bilateral    CARDIOVASCULAR:    [ ]Regular [ ]Irregular [ X]Tachy  [ ]Jerry [ ]Murmur [ ]Other  GASTROINTESTINAL:  [ X]Soft  [ ]Distended   [ X]+BS  [X ]Non tender [ ]Tender  [ ]PEG [ ]OGT/ NGT   Last BM:  5/18/21    GENITOURINARY:  [ ]Normal [ X] Incontinent   [ ]Oliguria/Anuria   [x ]Qureshi  MUSCULOSKELETAL:   [ ]Normal   [ X]Weakness  [ X]Bed/Wheelchair bound [ ]Edema  NEUROLOGIC:   [ ]No focal deficits  [X ] Cognitive impairment  [ ] Dysphagia [ ]Dysarthria [ ] Paresis [ ]Other   SKIN:   [ ]Normal  [ ]Rash     [ X]Pressure ulcer(s)  [X ]y [ ]n  Present on admission  Sacrum stage 2, Coccyx stage 2 and left first toe unstageable. Please see nursing assessment for further details     CRITICAL CARE:  [ ] Shock Present  [ ]Septic [ ]Cardiogenic [ ]Neurologic [ ]Hypovolemic  [ ]  Vasopressors [ ]  Inotropes   [ ] Respiratory failure present [ ] Mechanical Ventilation [ ] Non-invasive ventilatory support [ ] High-Flow  [ ] Acute  [ ] Chronic [ ] Hypoxic  [ ] Hypercarbic [ ] Other  [X ] Other organ failure- Brain-dementia        LABS: None new    RADIOLOGY & ADDITIONAL STUDIES: None new    PROTEIN CALORIE MALNUTRITION: [ ] mild [ ] moderate [ ] severe  [ ] underweight [ ] morbid obesity    https://www.andeal.org/vault/2440/web/files/ONC/Table_Clinical%20Characteristics%20to%20Document%20Malnutrition-White%20JV%20et%20al%642596.pdf    Height (cm): 154.9 (05-12-21 @ 13:09), 160 (02-22-21 @ 16:10)  Weight (kg): 48.2 (05-12-21 @ 13:09), 47.2 (02-22-21 @ 16:10)  BMI (kg/m2): 20.1 (05-12-21 @ 13:09), 18.4 (02-22-21 @ 16:10)    [X ] PPSV2 < or = 30% [ ] significant weight loss [X ] poor nutritional intake [ ] anasarca Albumin, Serum: 2.0 g/dL (05-16-21 @ 07:03)    Artificial Nutrition [ ]     REFERRALS:   [ ]Chaplaincy  [ ] Hospice  [ ]Child Life  [X ]Social Work  [ ]Case management [ ]Holistic Therapy [ ] Physical Therapy [ ] Dietary   Goals of Care Document:    GAP TEAM PALLIATIVE CARE UNIT PROGRESS NOTE:      [  ] Patient on hospice program.    INDICATION FOR PALLIATIVE CARE UNIT SERVICES:    INTERVAL HPI/OVERNIGHT EVENTS:  The patient is seen and examined at the bedside. The patient required morphine 1mg IV X2 within the last 24hr (8am-8am).     DNR on chart: Yes    Yes      Allergies    penicillin (Rash)    Intolerances    MEDICATIONS  (STANDING):  acetylcysteine 20% for bronchoscopy 4 milliLiter(s) Nebulizer <User Schedule>  fosphenytoin IVPB 100 milliGRAM(s) PE IV Intermittent every 8 hours  levETIRAcetam  IVPB 750 milliGRAM(s) IV Intermittent every 12 hours  morphine  - Injectable 1 milliGRAM(s) IV Push every 8 hours    MEDICATIONS  (PRN):  bisacodyl Suppository 10 milliGRAM(s) Rectal daily PRN Constipation  LORazepam   Injectable 0.5 milliGRAM(s) IV Push every 6 hours PRN Agitation  morphine  - Injectable 1.5 milliGRAM(s) IV Push every 1 hour PRN pain    ITEMS UNCHECKED ARE NOT PRESENT    PRESENT SYMPTOMS: [X ]Unable to obtain due to poor mentation   Source if other than patient:  [ ]Family   [ X]Team     Pain: [ X] yes [ ] no see pain ad score  QOL impact -   Location -                    Aggravating factors -  Quality -  Radiation -  Timing-  Severity (0-10 scale):  Minimal acceptable level (0-10 scale):     Dyspnea:                           [ ]Mild [ ]Moderate [ ]Severe  Anxiety:                             [ ]Mild [ ]Moderate [ ]Severe  Fatigue:                             [ ]Mild [ ]Moderate [ ]Severe  Nausea:                             [ ]Mild [ ]Moderate [ ]Severe  Loss of appetite:              [ ]Mild [ ]Moderate [ ]Severe  Constipation:                    [ ]Mild [ ]Moderate [ ]Severe    PAINAD Score: 5,7 ,7 within the last 24hrs (8am-8am)    [email or web address] (Ctrl +  left click to view)  		  Other Symptoms:  [X ]All other review of systems negative     Palliative Performance Status Version 2:   20 %         http://npcrc.org/files/news/palliative_performance_scale_ppsv2.pdf  PHYSICAL EXAM:  Vital Signs Last 24 Hrs  T(C): 37.3 (20 May 2021 08:50), Max: 37.3 (20 May 2021 08:50)  T(F): 99.2 (20 May 2021 08:50), Max: 99.2 (20 May 2021 08:50)  HR: 102 (20 May 2021 08:50) (86 - 113)  BP: 112/64 (20 May 2021 08:50) (112/64 - 116/67)  BP(mean): --  RR: 18 (20 May 2021 08:50) (18 - 18)  SpO2: 94% (20 May 2021 08:50) (94% - 97%) I&O's Summary    19 May 2021 07:01  -  20 May 2021 07:00  --------------------------------------------------------  IN: 0 mL / OUT: 250 mL / NET: -250 mL    GENERAL:  [ ]Alert  [ ]Oriented x   [ X]Lethargic  [ ]Cachexia  [ ]Unarousable  [ ]Verbal- spontaneous [ ]Non-Verbal  Behavioral:   [ ] Anxiety  [ ] Delirium [ ] Agitation [ ] Other  HEENT:  [X ]Normal   [ ]Dry mouth   [ ]ET Tube/Trach  [ ]Oral lesions  PULMONARY:   [ ]Clear [ ]Tachypnea  [ X]Audible excessive secretions   [ ]Rhonchi        [ ]Right [ ]Left [ ]Bilateral  [ ]Crackles        [ ]Right [ ]Left [ ]Bilateral  [ ]Wheezing     [ ]Right [ ]Left [ ]Bilateral  [x ]Diminshed BS [ ]Right [ ]Left [ X]Bilateral    [x] Intermittent labored breathing.   CARDIOVASCULAR:    [ ]Regular [ ]Irregular [ X]Tachy  [ ]Jerry [ ]Murmur [ ]Other  GASTROINTESTINAL:  [ X]Soft  [ ]Distended   [ X]+BS  [X ]Non tender [ ]Tender  [ ]PEG [ ]OGT/ NGT   Last BM:  5/18/21    GENITOURINARY:  [ ]Normal [ X] Incontinent   [ ]Oliguria/Anuria   [x ]Qureshi  MUSCULOSKELETAL:   [ ]Normal   [ X]Weakness  [ X]Bed/Wheelchair bound [ ]Edema  NEUROLOGIC:   [ ]No focal deficits  [X ] Cognitive impairment  [ ] Dysphagia [ ]Dysarthria [ ] Paresis [ ]Other   SKIN:   [ ]Normal  [ ]Rash     [ X]Pressure ulcer(s)  [X ]y [ ]n  Present on admission  Sacrum stage 2, Coccyx stage 2 and left first toe unstageable. Please see nursing assessment for further details     CRITICAL CARE:  [ ] Shock Present  [ ]Septic [ ]Cardiogenic [ ]Neurologic [ ]Hypovolemic  [ ]  Vasopressors [ ]  Inotropes   [ ] Respiratory failure present [ ] Mechanical Ventilation [ ] Non-invasive ventilatory support [ ] High-Flow  [ ] Acute  [ ] Chronic [ ] Hypoxic  [ ] Hypercarbic [ ] Other  [X ] Other organ failure- Brain-dementia        LABS: None new    RADIOLOGY & ADDITIONAL STUDIES: None new    PROTEIN CALORIE MALNUTRITION: [ ] mild [ ] moderate [ ] severe  [ ] underweight [ ] morbid obesity    https://www.andeal.org/vault/2440/web/files/ONC/Table_Clinical%20Characteristics%20to%20Document%20Malnutrition-White%20JV%20et%20al%368743.pdf    Height (cm): 154.9 (05-12-21 @ 13:09), 160 (02-22-21 @ 16:10)  Weight (kg): 48.2 (05-12-21 @ 13:09), 47.2 (02-22-21 @ 16:10)  BMI (kg/m2): 20.1 (05-12-21 @ 13:09), 18.4 (02-22-21 @ 16:10)    [X ] PPSV2 < or = 30% [ ] significant weight loss [X ] poor nutritional intake [ ] anasarca Albumin, Serum: 2.0 g/dL (05-16-21 @ 07:03)    Artificial Nutrition [ ]     REFERRALS:   [ ]Chaplaincy  [ ] Hospice  [ ]Child Life  [X ]Social Work  [ ]Case management [ ]Holistic Therapy [ ] Physical Therapy [ ] Dietary   Goals of Care Document:

## 2021-05-20 NOTE — PROGRESS NOTE ADULT - PROBLEM SELECTOR PLAN 1
The patient has multiple pressure ulcer. She required PRN morphine 1mg IV X2 within a 24hr period (8am-8am)  Ordered Morphine 1mg IV q8hr ATC. Increased PRN Morphine to 1.5mg IV q1hr  Consider premedicating with position change  Bowel regimen while on opioids Patient seen this morning using accessory muscles to breath. Respiration in the 20's  Ordered Morphine 1.5mg IV q1hr PRN for dyspnea

## 2021-05-20 NOTE — PROGRESS NOTE ADULT - SUBJECTIVE AND OBJECTIVE BOX
HOSPITALIST NOTE    Dr. Rai Hernandez DO  Attending Physician  Division of Hospital Medicine  [facility]  Pager:  [phone]    SUBJECTIVE and ROS limited by dementia/delirium.    PAST MEDICAL & SURGICAL HISTORY:  SDH (subdural hematoma)    Atrial fibrillation    Dementia    Diabetes    HTN (hypertension)    Cerebrovascular accident (CVA)    Seizure    No significant past surgical history        MEDICATIONS  (STANDING):  acetylcysteine 20% for bronchoscopy 4 milliLiter(s) Nebulizer <User Schedule>  clonazePAM Oral Disintegrating Tablet 0.5 milliGRAM(s) Oral two times a day  morphine  - Injectable 1 milliGRAM(s) IV Push every 8 hours    MEDICATIONS  (PRN):  bisacodyl Suppository 10 milliGRAM(s) Rectal daily PRN Constipation  LORazepam   Injectable 0.5 milliGRAM(s) IV Push every 6 hours PRN Agitation  LORazepam   Injectable 1 milliGRAM(s) IV Push once PRN Seizure lasting > 2 minutes  morphine  - Injectable 1.5 milliGRAM(s) IV Push every 1 hour PRN pain      Allergies    penicillin (Rash)    Intolerances        T(C): 37.3 (05-20-21 @ 08:50), Max: 37.3 (05-20-21 @ 08:50)  T(F): 99.2 (05-20-21 @ 08:50), Max: 99.2 (05-20-21 @ 08:50)  HR: 102 (05-20-21 @ 08:50) (86 - 113)  BP: 112/64 (05-20-21 @ 08:50) (112/64 - 116/67)  ABP: --  ABP(mean): --  RR: 18 (05-20-21 @ 08:50) (18 - 18)  SpO2: 94% (05-20-21 @ 08:50) (94% - 97%)      CONSTITUTIONAL: No acute distress.    Cardiovascular: Irregularly irregular; rate wnl; with no murmurs.  No lower extremity edema B/L. Extremities are warm and well perfused.     Respiratory: Dec bs of left lung fields. No wrr. No accessory muscle use.   Gastrointestinal:  Soft, nontender. Non-distended. Non-rigid.   Neurologic:  Awake. Oriented x0. Not following commands.   SKIN: As noted prior:  2cm ulcer at left metatarsal joint with mild surrounding erythema, no drainage, no significant warmth, small ulcers on right 5th toe with mild surrounding erythema no drainage.   : indwelling duque.    LABS                ADDITIONAL STUDIES PERSONALLY REVIEWED    Our team discussed the case with all consults    All consultant contribution to care greatly appreciated.

## 2021-05-20 NOTE — PROGRESS NOTE ADULT - PROBLEM SELECTOR PLAN 6
HR stable off bb.   not candidate for a/c.  reducing polypharmacy for comfort.  optimize management of pain and anxiety for comfort.

## 2021-05-20 NOTE — PROGRESS NOTE ADULT - PROBLEM SELECTOR PLAN 3
Fast 7 C and therefore hospice eligible.   supportive care Primary RN reports that the patient is restless and is taking her clothes off  this morning  Ativan 0.5mg IV q6hr PRN ordered

## 2021-05-21 NOTE — PROGRESS NOTE ADULT - PROBLEM SELECTOR PLAN 1
-Improved but not completely controlled  -Will initiate morphine infusion at 0.3cc/hr  c/w Morphine 1.5mg IV q1hr PRN for dyspnea

## 2021-05-21 NOTE — PROGRESS NOTE ADULT - SUBJECTIVE AND OBJECTIVE BOX
Patient is a 89y old  Female who presents with a chief complaint of Referred from SNF for delirium (21 May 2021 14:38)       INTERVAL HPI/OVERNIGHT EVENTS:  Patient seen and evaluated at bedside.  Pt is resting comfortable in NAD.     Allergies    penicillin (Rash)    Intolerances        Vital Signs Last 24 Hrs  T(C): 38.7 (21 May 2021 08:26), Max: 38.7 (21 May 2021 08:26)  T(F): 101.6 (21 May 2021 08:26), Max: 101.6 (21 May 2021 08:26)  HR: 136 (21 May 2021 08:26) (136 - 136)  BP: 98/61 (21 May 2021 08:26) (98/61 - 98/61)  BP(mean): --  RR: 32 (21 May 2021 08:26) (32 - 32)  SpO2: 89% (21 May 2021 08:26) (89% - 89%)    LABS:              CAPILLARY BLOOD GLUCOSE

## 2021-05-21 NOTE — PROGRESS NOTE ADULT - PROBLEM SELECTOR PLAN 3
Primary RN reports that the patient is restless and is taking her clothes off  this morning  Ativan 0.5mg IV q6hr PRN ordered' x1 in 24 hours

## 2021-05-21 NOTE — PROGRESS NOTE ADULT - SUBJECTIVE AND OBJECTIVE BOX
GAP TEAM PALLIATIVE CARE UNIT PROGRESS NOTE:      [  ] Patient on hospice program.    INDICATION FOR PALLIATIVE CARE UNIT SERVICES:    INTERVAL HPI/OVERNIGHT EVENTS:  The patient is seen and examined at the bedside. The patient required morphine 1.5mg IV x1 for pain and x2 for dyspnea within the last 24hr (8am-8am). Also Pt received Ativan 0.5mg IV x1.    DNR on chart: Yes    Yes      Allergies: penicillin (Rash)      MEDICATIONS  (STANDING):  acetylcysteine 20% for bronchoscopy 4 milliLiter(s) Nebulizer <User Schedule>  fosphenytoin IVPB 100 milliGRAM(s) PE IV Intermittent every 8 hours  levETIRAcetam  IVPB 750 milliGRAM(s) IV Intermittent every 12 hours  morphine  - Injectable 1 milliGRAM(s) IV Push every 8 hours    MEDICATIONS  (PRN):  bisacodyl Suppository 10 milliGRAM(s) Rectal daily PRN Constipation  LORazepam   Injectable 0.5 milliGRAM(s) IV Push every 6 hours PRN Agitation  morphine  - Injectable 1.5 milliGRAM(s) IV Push every 1 hour PRN pain    ITEMS UNCHECKED ARE NOT PRESENT    PRESENT SYMPTOMS: [X ]Unable to obtain due to poor mentation   Source if other than patient:  [ ]Family   [ X]Team     Pain: [ X] yes [ ] no see pain ad score  QOL impact -   Location -                    Aggravating factors -  Quality -  Radiation -  Timing-  Severity (0-10 scale):  Minimal acceptable level (0-10 scale):     Dyspnea:                           [ ]Mild [ ]Moderate [ ]Severe  Anxiety:                             [ ]Mild [ ]Moderate [ ]Severe  Fatigue:                             [ ]Mild [ ]Moderate [ ]Severe  Nausea:                             [ ]Mild [ ]Moderate [ ]Severe  Loss of appetite:              [ ]Mild [ ]Moderate [ ]Severe  Constipation:                    [ ]Mild [ ]Moderate [ ]Severe    PAINAD Score: 5,7 ,7 within the last 24hrs (8am-8am)    [email or web address] (Ctrl +  left click to view)  		  Other Symptoms:  [X ]All other review of systems negative     Palliative Performance Status Version 2:   20 %         http://[facility]rc.org/files/news/palliative_performance_scale_[email or web address].pdf  PHYSICAL EXAM:  Vital Signs Last 24 Hrs  T(C): 38.7 (21 May 2021 08:26), Max: 38.7 (21 May 2021 08:26)  T(F): 101.6 (21 May 2021 08:26), Max: 101.6 (21 May 2021 08:26)  HR: 136 (21 May 2021 08:26) (136 - 136)  BP: 98/61 (21 May 2021 08:26) (98/61 - 98/61)  RR: 32 (21 May 2021 08:26) (32 - 32)  SpO2: 89% (21 May 2021 08:26) (89% - 89%)      05-20 @ 07:01  -  05-21 @ 07:00  --------------------------------------------------------  IN: 0 mL / OUT: 340 mL / NET: -340 mL        GENERAL:  [ ]Alert  [ ]Oriented x   [ X]Lethargic  [ ]Cachexia  [ ]Unarousable  [ ]Verbal [ X]Non-Verbal  Behavioral:   [ ] Anxiety  [ ] Delirium [ ] Agitation [ ] Other  HEENT:  [X ]Normal   [ ]Dry mouth   [ ]ET Tube/Trach  [ ]Oral lesions  PULMONARY:   [ ]Clear [ ]Tachypnea  [ X]Audible excessive secretions   [ ]Rhonchi        [ ]Right [ ]Left [ ]Bilateral  [ ]Crackles        [ ]Right [ ]Left [ ]Bilateral  [ ]Wheezing     [ ]Right [ ]Left [ ]Bilateral  [x ]Diminshed BS [ ]Right [ ]Left [ X]Bilateral    [x] Intermittent labored breathing.   CARDIOVASCULAR:    [ ]Regular [ ]Irregular [ X]Tachy  [ ]Jerry [ ]Murmur [ ]Other  GASTROINTESTINAL:  [ X]Soft  [ ]Distended   [ X]+BS  [X ]Non tender [ ]Tender  [ ]PEG [ ]OGT/ NGT   Last BM:  5/18/21    GENITOURINARY:  [ ]Normal [ X] Incontinent   [ ]Oliguria/Anuria   [x ]Qureshi  MUSCULOSKELETAL:   [ ]Normal   [ X]Weakness  [ X]Bed/Wheelchair bound [ ]Edema  NEUROLOGIC:   [ ]No focal deficits  [X ] Cognitive impairment  [ ] Dysphagia [ ]Dysarthria [ ] Paresis [ ]Other   SKIN:   [ ]Normal  [ ]Rash     [ X]Pressure ulcer(s)  [X ]y [ ]n  Present on admission  Sacrum stage 2, Coccyx stage 2 and left first toe unstageable. Please see nursing assessment for further details     CRITICAL CARE:  [ ] Shock Present  [ ]Septic [ ]Cardiogenic [ ]Neurologic [ ]Hypovolemic  [ ]  Vasopressors [ ]  Inotropes   [ ] Respiratory failure present [ ] Mechanical Ventilation [ ] Non-invasive ventilatory support [ ] High-Flow  [ ] Acute  [ ] Chronic [ ] Hypoxic  [ ] Hypercarbic [ ] Other  [X ] Other organ failure- Brain-dementia        LABS: None new    RADIOLOGY & ADDITIONAL STUDIES: None new    PROTEIN CALORIE MALNUTRITION: [ ] mild [ ] moderate [ ] severe  [ ] underweight [ ] morbid obesity    https://www.andeal.org/vault/2440/web/files/ONC/Table_Clinical%20Characteristics%20to%20Document%20Malnutrition-White%20JV%20et%20al%478950.pdf    Height (cm): 154.9 (05-12-21 @ 13:09), 160 (02-22-21 @ 16:10)  Weight (kg): 48.2 (05-12-21 @ 13:09), 47.2 (02-22-21 @ 16:10)  BMI (kg/m2): 20.1 (05-12-21 @ 13:09), 18.4 (02-22-21 @ 16:10)    [X ] PPSV2 < or = 30% [ ] significant weight loss [X ] poor nutritional intake [ ] anasarca Albumin, Serum: 2.0 g/dL (05-16-21 @ 07:03)    Artificial Nutrition [ ]     REFERRALS:   [ ]Chaplaincy  [ ] Hospice  [ ]Child Life  [X ]Social Work  [ ]Case management [ ]Holistic Therapy [ ] Physical Therapy [ ] Dietary   Goals of Care Document:

## 2021-05-22 NOTE — PROGRESS NOTE ADULT - SUBJECTIVE AND OBJECTIVE BOX
GAP TEAM PALLIATIVE CARE UNIT PROGRESS NOTE:      [  ] Patient on hospice program.    INDICATION FOR PALLIATIVE CARE UNIT SERVICES: end of life care in setting of 89 year old female with hx of dementia p/w AMS due to severe hypernatremia from dehydration with positive LE ulcer infection and mucus plugging of bronchi and left lung collapse.    INTERVAL HPI/OVERNIGHT EVENTS:    Overnight, Morphine gtt increased to 0.5mg/ hour  In past 24 hours, patient received 3 prn doses of IV Morphine 1.5mg and 1 prn dose of Robinul.  Patient seen at bedside; patient is non-verbal; appeared mildly tachypneic at bedside; bedside RN to administer PRN dose of Morphine    DNR on chart: Yes    Yes      Allergies: penicillin (Rash)    MEDICATIONS  (STANDING):  fosphenytoin IVPB 100 milliGRAM(s) PE IV Intermittent every 8 hours  levETIRAcetam  IVPB 750 milliGRAM(s) IV Intermittent every 12 hours  morphine  Infusion. 1 mG/Hr (1 mL/Hr) IV Continuous <Continuous>  sodium chloride 0.9%. 1000 milliLiter(s) (10 mL/Hr) IV Continuous <Continuous>    MEDICATIONS  (PRN):  acetaminophen  Suppository .. 650 milliGRAM(s) Rectal every 6 hours PRN Temp greater or equal to 38C (100.4F)  bisacodyl Suppository 10 milliGRAM(s) Rectal daily PRN Constipation  glycopyrrolate Injectable 0.4 milliGRAM(s) IV Push every 6 hours PRN secretion  LORazepam   Injectable 0.5 milliGRAM(s) IV Push every 6 hours PRN Agitation  morphine  - Injectable 2 milliGRAM(s) IV Push every 1 hour PRN pain  morphine  - Injectable 2 milliGRAM(s) IV Push every 1 hour PRN dyspnea      ITEMS UNCHECKED ARE NOT PRESENT    PRESENT SYMPTOMS: [X ]Unable to obtain due to poor mentation   Source if other than patient:  [ ]Family   [ X]Team     Pain: [ X] yes [ ] no see pain ad score  QOL impact -   Location -                    Aggravating factors -  Quality -  Radiation -  Timing-  Severity (0-10 scale):  Minimal acceptable level (0-10 scale):     Dyspnea:                           [ ]Mild [ ]Moderate [ ]Severe  Anxiety:                             [ ]Mild [ ]Moderate [ ]Severe  Fatigue:                             [ ]Mild [ ]Moderate [ ]Severe  Nausea:                             [ ]Mild [ ]Moderate [ ]Severe  Loss of appetite:              [ ]Mild [ ]Moderate [ ]Severe  Constipation:                    [ ]Mild [ ]Moderate [ ]Severe    PAINAD Score: 0-1 within the last 24hrs (8am-8am)    [email or web address] (Ctrl +  left click to view)  		  Other Symptoms:  [ ]All other review of systems negative - unable to obtain due to mental status    Palliative Performance Status Version 2:   10 %         http://[email or web address]/files/[email or web address]_performance_[email or web address]_[email or web address]    PHYSICAL EXAM:  Vital Signs Last 24 Hrs  T(C): 37.6 (22 May 2021 07:37), Max: 37.6 (22 May 2021 07:37)  T(F): 99.6 (22 May 2021 07:37), Max: 99.6 (22 May 2021 07:37)  HR: 132 (22 May 2021 07:37) (132 - 132)  BP: 100/49 (22 May 2021 07:37) (100/49 - 100/49)  BP(mean): --  RR: 22 (22 May 2021 07:37) (22 - 22)  SpO2: 85% (22 May 2021 07:37) (85% - 85%)      GENERAL:  [ ]Alert  [ ]Oriented x   [ X]Lethargic  [ ]Cachexia  [ ]Unarousable  [ ]Verbal [ X]Non-Verbal  Behavioral:   [ ] Anxiety  [ ] Delirium [ ] Agitation [ ] Other  HEENT:  [X ]Normal   [ ]Dry mouth   [ ]ET Tube/Trach  [ ]Oral lesions  PULMONARY:   [ ]Clear [ ]Tachypnea  [ ]Audible excessive secretions   [ ]Rhonchi        [ ]Right [ ]Left [ ]Bilateral  [ ]Crackles        [ ]Right [ ]Left [ ]Bilateral  [ ]Wheezing     [ ]Right [ ]Left [ ]Bilateral  [x ]Diminshed BS [ ]Right [ ]Left [ X]Bilateral    [x] Intermittent labored breathing.   CARDIOVASCULAR:    [ ]Regular [ ]Irregular [ X]Tachy  [ ]Jerry [ ]Murmur [ ]Other  GASTROINTESTINAL:  [ X]Soft  [ ]Distended   [ X]+BS  [X ]Non tender [ ]Tender  [ ]PEG [ ]OGT/ NGT   Last BM:  5/18/21  GENITOURINARY:  [ ]Normal [ X] Incontinent   [ ]Oliguria/Anuria   [x ]Qureshi  MUSCULOSKELETAL:   [ ]Normal   [ X]Weakness  [ X]Bed/Wheelchair bound [ ]Edema  NEUROLOGIC:   [ ]No focal deficits  [X ] Cognitive impairment  [ ] Dysphagia [ ]Dysarthria [ ] Paresis [ ]Other   SKIN:   [ ]Normal  [ ]Rash     [ X]Pressure ulcer(s)  [X ]y [ ]n  Present on admission  Sacrum stage 2, Coccyx stage 2 and left first toe unstageable. Please see nursing assessment for further details     CRITICAL CARE:  [ ] Shock Present  [ ]Septic [ ]Cardiogenic [ ]Neurologic [ ]Hypovolemic  [ ]  Vasopressors [ ]  Inotropes   [ ] Respiratory failure present [ ] Mechanical Ventilation [ ] Non-invasive ventilatory support [ ] High-Flow  [ ] Acute  [ ] Chronic [ ] Hypoxic  [ ] Hypercarbic [ ] Other  [X ] Other organ failure- Brain-dementia        LABS: None new    RADIOLOGY & ADDITIONAL STUDIES: None new    PROTEIN CALORIE MALNUTRITION: [ ] mild [ ] moderate [ ] severe  [ ] underweight [ ] morbid obesity    https://www.andeal.org/vault/2440/web/files/ONC/Table_Clinical%20Characteristics%20to%20Document%20Malnutrition-White%20JV%20et%20al%449840.pdf    Height (cm): 154.9 (05-12-21 @ 13:09), 160 (02-22-21 @ 16:10)  Weight (kg): 48.2 (05-12-21 @ 13:09), 47.2 (02-22-21 @ 16:10)  BMI (kg/m2): 20.1 (05-12-21 @ 13:09), 18.4 (02-22-21 @ 16:10)    [X ] PPSV2 < or = 30% [ ] significant weight loss [X ] poor nutritional intake [ ] anasarca Albumin, Serum: 2.0 g/dL (05-16-21 @ 07:03)    Artificial Nutrition [ ]     REFERRALS:   [ ]Chaplaincy  [ ] Hospice  [ ]Child Life  [X ]Social Work  [ ]Case management [ ]Holistic Therapy [ ] Physical Therapy [ ] Dietary   Goals of Care Document:

## 2021-05-22 NOTE — PROGRESS NOTE ADULT - PROBLEM SELECTOR PLAN 1
- Labored breathing at bedside this AM  - Increase to IV morphine infusion at 1 ml/ hour  - Increase to IV Morphine 2mg IV q1hr PRN for dyspnea

## 2021-05-22 NOTE — PROGRESS NOTE ADULT - PROBLEM SELECTOR PLAN 2
- Increase to IV morphine infusion at 1 ml/ hour  - Increase to IV Morphine 2mg IV q1hr PRN for dyspnea  - Bowel regimen while on opioids

## 2021-05-23 NOTE — PROGRESS NOTE ADULT - PROVIDER SPECIALTY LIST ADULT
Podiatry
Podiatry
Palliative Care
Podiatry
Hospitalist
Hospitalist
Palliative Care
Hospitalist
Hospitalist
Palliative Care
Hospitalist
Hospitalist
Palliative Care
Hospitalist
Palliative Care
Hospitalist
Internal Medicine
Palliative Care

## 2021-05-23 NOTE — PROGRESS NOTE ADULT - PROBLEM SELECTOR PROBLEM 8
Encounter for palliative care
Functional quadriplegia
Encounter for palliative care
Encounter for palliative care

## 2021-05-23 NOTE — PROGRESS NOTE ADULT - ASSESSMENT
89 y/o F--patient is the grandmother of Dr. 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  We are supporting family and assisting with goals of care.  
89 y/o F--patient is the grandmother of Dr. Reyes, with a history of moderate cognitive impairment, type 2 DM, atrial fibrillation no longer on full AC, past CVA with traumatic B/L subdural hematoma s/P fall in Sept 2020 with complications of seizures, recently discharged from [place] in Feb 2021 treatment for osteomyelitis of the foot with correction of hypernatremia, with patient discharged to the SNF on Feb 26 2021, but now returned with decreased responsiveness and PO, and hypoxemia   Being treated for infection and hypernatremia.  Family has opted for conservative management and in PCU for end of life care  
89 year old female visited at bedside for evaluation of stable ulcerative lesion medial 1 metatarsal phalangeal joint left foot. Eschar dorsal 5th toe right foot. DP and PT non-palpable both feet +PVD. No edema, erythema or drainage no signs of cellulitis both feet. No signs of acute infection both feet. Recommend betadine and dsd daily and continue with z-float boots  Will monitor during this admission
89F h/o dementia, DM2 (not on insulin), afib (not on a/c), traumatic SDH c/b seizures p/w AMS due to severe hypernatremia from dehydration with positive LE ulcer infection.   
89 year old female visited at bedside for evaluation of stable ulcerative lesion medial 1 metatarsal phalangeal joint left foot. Eschar dorsal 5th toe right foot. DP and PT non-palpable both feet +PVD. No edema, erythema or drainage no signs of cellulitis both feet. No signs of acute infection both feet. Recommend betadine and dsd daily and continue with z-float boots  Will monitor during this admission  
89F h/o dementia, DM2 (not on insulin), afib (not on a/c), traumatic SDH c/b seizures p/w AMS due to severe hypernatremia from dehydration with positive LE ulcer infection.   
89 y/o F--patient is the grandmother of Dr. Reyes, with a history of moderate cognitive impairment, type 2 DM, atrial fibrillation no longer on full AC, past CVA with traumatic B/L subdural hematoma s/P fall in Sept 2020 with complications of seizures, recently discharged from [place] in Feb 2021 treatment for osteomyelitis of the foot with correction of hypernatremia, with patient discharged to the SNF on Feb 26 2021, but now returned with decreased responsiveness and PO, and hypoxemia   Being treated for infection and hypernatremia.  We are supporting family and assisting with goals of care.  
89F h/o dementia, DM2 (not on insulin), afib (not on a/c), traumatic SDH c/b seizures p/w AMS due to severe hypernatremia from dehydration with positive LE ulcer infection treated with empiric abx.    
89F h/o dementia, DM2 (not on insulin), afib (not on a/c), traumatic SDH c/b seizures p/w AMS due to severe hypernatremia from dehydration with positive LE ulcer infection treated with empiric abx.    
89 year old female visited at bedside for evaluation of stable ulcerative lesion medial 1 metatarsal phalangeal joint left foot. Eschar dorsal 5th toe right foot. DP and PT non-palpable both feet +PVD. No edema, erythema or drainage no signs of cellulitis both feet. No signs of acute infection both feet. Recommend betadine and dsd daily and continue with z-float boots  Podiatry will check next week. reconsult podiatry sooner if needed
89F h/o dementia, DM2 (not on insulin), afib (not on a/c), traumatic SDH c/b seizures p/w AMS due to severe hypernatremia from dehydration with positive LE ulcer infection.   
89F h/o dementia, DM2 (not on insulin), afib (not on a/c), traumatic SDH c/b seizures p/w AMS due to severe hypernatremia from dehydration with positive LE ulcer infection treated with empiric abx.    
89 y/o F--patient is the grandmother of Dr. Reyes, with a history of moderate cognitive impairment, type 2 DM, atrial fibrillation no longer on full AC, past CVA with traumatic B/L subdural hematoma s/P fall in Sept 2020 with complications of seizures, recently discharged from [place] in Feb 2021 treatment for osteomyelitis of the foot with correction of hypernatremia, with patient discharged to the SNF on Feb 26 2021, but now returned with decreased responsiveness and PO, and hypoxemia   Being treated for infection and hypernatremia.  We are supporting family and assisting with goals of care.  
89 y/o F--patient is the grandmother of Dr. Reyes, with a history of moderate cognitive impairment, type 2 DM, atrial fibrillation no longer on full AC, past CVA with traumatic B/L subdural hematoma s/P fall in Sept 2020 with complications of seizures, recently discharged from [place] in Feb 2021 treatment for osteomyelitis of the foot with correction of hypernatremia, with patient discharged to the SNF on Feb 26 2021, but now returned with decreased responsiveness and PO, and hypoxemia   Being treated for infection and hypernatremia.  We are supporting family and assisting with goals of care.  
89F h/o dementia, DM2 (not on insulin), afib (not on a/c), traumatic SDH c/b seizures p/w AMS due to severe hypernatremia from dehydration with positive LE ulcer infection.   
89F h/o dementia, DM2 (not on insulin), afib (not on a/c), traumatic SDH c/b seizures p/w AMS due to severe hypernatremia from dehydration with positive LE ulcer infection treated with empiric abx.    
89 y/o F--patient is the grandmother of Dr. Reyes, with a history of moderate cognitive impairment, type 2 DM, atrial fibrillation no longer on full AC, past CVA with traumatic B/L subdural hematoma s/P fall in Sept 2020 with complications of seizures, recently discharged from [place] in Feb 2021 treatment for osteomyelitis of the foot with correction of hypernatremia, with patient discharged to the SNF on Feb 26 2021, but now returned with decreased responsiveness and PO, and hypoxemia   Being treated for infection and hypernatremia.  We are supporting family and assisting with goals of care.  
89F h/o dementia, DM2 (not on insulin), afib (not on a/c), traumatic SDH c/b seizures p/w AMS due to severe hypernatremia from dehydration with positive LE ulcer infection treated with empiric abx.    
89 y/o F--patient is the grandmother of Dr. Reyes, with a history of moderate cognitive impairment, type 2 DM, atrial fibrillation no longer on full AC, past CVA with traumatic B/L subdural hematoma s/P fall in Sept 2020 with complications of seizures, recently discharged from [place] in Feb 2021 treatment for osteomyelitis of the foot with correction of hypernatremia, with patient discharged to the SNF on Feb 26 2021, but now returned with decreased responsiveness and PO, and hypoxemia   Being treated for infection and hypernatremia.  We are supporting family and assisting with goals of care in the PCU.  
89 y/o F--patient is the grandmother of Dr. Reyes, with a history of moderate cognitive impairment, type 2 DM, atrial fibrillation no longer on full AC, past CVA with traumatic B/L subdural hematoma s/P fall in Sept 2020 with complications of seizures, recently discharged from [place] in Feb 2021 treatment for osteomyelitis of the foot with correction of hypernatremia, with patient discharged to the SNF on Feb 26 2021, but now returned with decreased responsiveness and PO, and hypoxemia   Being treated for infection and hypernatremia.  Family has opted for conservative management and in PCU for end of life care

## 2021-05-23 NOTE — DISCHARGE NOTE FOR THE EXPIRED PATIENT - HOSPITAL COURSE
89 y/o female with a history of moderate cognitive impairment, type 2 DM, atrial fibrillation no longer on full AC, past CVA with traumatic B/L subdural hematoma s/P fall in 2020 with complications of seizures, recently discharged from [place] in 2021 for delirium with patient seen by neurology and treatment for osteomyelitis of the foot with correction of hypernatremia, with patient discharged to the SNF on 2021. Patient presented with decreased responsiveness and PO, and hypoxemia.  Work-up notable for hypernatremia and collapse of the left upper and lower lobes. Patient was treated for positive LE ulcer infection treated with empiric abx.       Patient is DNR/DNI. Family opted for conservative management. Palliative care consulted for supporting family and assisting with goals of care in the PCU. Initially, planned for discharge to NH, however patient clinically declined. Patient remained in PCU for end of life care.     Patient  on 2021. Family was notified.

## 2021-05-23 NOTE — PROGRESS NOTE ADULT - ATTENDING COMMENTS
89 y/o F with advanced dementia, type 2 DM, atrial fibrillation no longer on full AC, past CVA with traumatic B/L subdural hematoma s/P fall in Sept 2020 with complications of seizures, recently discharged from [place] in Feb 2021 treatment for osteomyelitis of the foot with correction of hypernatremia, with patient discharged to the SNF on Feb 26 2021, but now returned with decreased responsiveness and PO, and hypoxemia. Currently she is being treated for infection and hypernatremia.  We are supporting family and assisting with goals of care and resources.     Will continue to monitor for PCU needs.   Ongoing discussions about dispo planning and including NH with hospice.         Matthew Potts MD   Geriatrics and Palliative Care (GAP) Consult Service    of Geriatric and Palliative Medicine  [facility]      Please page the following number for clinical matters between the hours of 9 am and 5 pm from Monday through Friday : [phone]    After 5pm and on weekends, please see the contact information below:    In the event of newly developing, evolving, or worsening symptoms, please contact the Palliative Medicine team via pager (if the patient is at [facility] #[phone] or if the patient is at [facility] #[phone]) The Geriatric and Palliative Medicine service has coverage 24 hours a day/ 7 days a week to provide medical recommendations regarding symptom management needs via telephone
89 y/o F with advanced dementia, type 2 DM, atrial fibrillation no longer on full AC, past CVA with traumatic B/L subdural hematoma s/P fall in Sept 2020 with complications of seizures, recently discharged from [place] in Feb 2021 treatment for osteomyelitis of the foot with correction of hypernatremia, with patient discharged to the SNF on Feb 26 2021, but now returned with decreased responsiveness and PO, and hypoxemia. Currently she is being treated for infection and hypernatremia.  We are supporting family and assisting with goals of care and resources.     Will continue inpatient care for IV AEDs. D/w Neuro that indicated based on patient's/family's whishes and while understanding that not ideal, an alternative to current IV AED regimen may be Clonazepam ODT 1mg BID while continuing Ativan 1mg IV prn seizure activity lasting > 2min. If accepting facility unable to do IV ativan can consider Diastat rectal. While have to check with [facility] and the NH to see if they will be able to provide this alternate AED regimen. If not inpatient hospice or PCU will then be the only option.    Ongoing discussions about dispo planning and including NH with hospice.         Matthew Potts MD   Geriatrics and Palliative Care (GAP) Consult Service    of Geriatric and Palliative Medicine  [facility] .
89 y/o F with advanced dementia, type 2 DM, atrial fibrillation no longer on full AC, past CVA with traumatic B/L subdural hematoma s/P fall in Sept 2020 with complications of seizures, recently discharged from [place] in Feb 2021 treatment for osteomyelitis of the foot with correction of hypernatremia, with patient discharged to the SNF on Feb 26 2021, but now returned with decreased responsiveness and PO, and hypoxemia. Currently she is being treated for infection and hypernatremia.  We are supporting family and assisting with goals of care and resources.     Will continue inpatient care for IV AEDs. Will consult Epilpsy if NH/[facility] can not provide IV AEDs at the NH.    Ongoing discussions about dispo planning and including NH with hospice.         Matthew Potts MD   Geriatrics and Palliative Care (GAP) Consult Service    of Geriatric and Palliative Medicine  [facility]
89 y/o F with advanced dementia, type 2 DM, atrial fibrillation no longer on full AC, past CVA with traumatic B/L subdural hematoma s/P fall in Sept 2020 with complications of seizures, recently discharged from [place] in Feb 2021 treatment for osteomyelitis of the foot with correction of hypernatremia, with patient discharged to the SNF on Feb 26 2021, but now returned with decreased responsiveness and PO, and hypoxemia. Currently she is being treated for infection and hypernatremia.  We are supporting family and assisting with goals of care and resources.     Pain: Will start Morphine infusion at 0.5mg/hr and will continue 1.5mg IV q 1 PRN   Respiratory distress: O2 by NC. Morphine as above.   Seizures: Patient with poor PO tolerance and even to Clonazepam ODT. On Keppra, Cerebyx, and    Dispo: The patient appears to be declining and with higher inpatient needs that what his NH can offer. On 5/21, I D/w Dr. Reyes and he agree on that continuing PCU care is better for the patient at this time.   Wound care        Matthew Potts MD   Geriatrics and Palliative Care (GAP) Consult Service    of Geriatric and Palliative Medicine  [facility] .
ES Dementia, seizure d/o.  increase morphine gtt to 1mg/hr with 2mg prn.  family aware- goal for comfort.
ES Dementia- increased morphine gtt for increased work of breathing.  family made aware. goal is for comfort.
89 y/o F with advanced dementia, type 2 DM, atrial fibrillation no longer on full AC, past CVA with traumatic B/L subdural hematoma s/P fall in Sept 2020 with complications of seizures, recently discharged from [place] in Feb 2021 treatment for osteomyelitis of the foot with correction of hypernatremia, with patient discharged to the SNF on Feb 26 2021, but now returned with decreased responsiveness and PO, and hypoxemia. Currently she is being treated for infection and hypernatremia.  We are supporting family and assisting with goals of care and resources.     Pain: Will start Morphine 1 mg IV q 8ATC and 1.5mg IV q 1 PRN   Respiratory distress: O2 by NC. Morphine as above.   Seizures: Patient with poor PO tolerance and dry mouth that is not allowing for Clonazepam to appropriately dissolve. Will switch back to IV AED.   Dispo: The patient appears to be declining and with higher inpatient needs that what his NH can offer. D/w Dr. Reyes and we agree on that continuing PCU care is better for the patient at this time.   Wound care        Matthew Potts MD   Geriatrics and Palliative Care (GAP) Consult Service    of Geriatric and Palliative Medicine  [facility] .

## 2021-05-23 NOTE — PROGRESS NOTE ADULT - PROBLEM SELECTOR PLAN 9
- Spoke with patient's grandson Dr. Reyes to provide updates on plan of care including increasing Morphine gtt rate and increasing PRN dosing of Morphine for better symptom control.   - Continue with management in PCU

## 2021-05-23 NOTE — PROGRESS NOTE ADULT - REASON FOR ADMISSION
Referred from SNF for delirium

## 2021-05-23 NOTE — PROGRESS NOTE ADULT - PROBLEM SELECTOR PROBLEM 7
Advanced care planning/counseling discussion
Pressure ulcer
Functional quadriplegia
Advanced care planning/counseling discussion
Functional quadriplegia
Functional quadriplegia

## 2021-05-23 NOTE — PROGRESS NOTE ADULT - PROBLEM SELECTOR PLAN 1
- Increase to IV Morphine infusion at 2 ml/ hour  - Increase to IV Morphine 4mg IV q1hr PRN for dyspnea

## 2021-05-23 NOTE — PROGRESS NOTE ADULT - NSICDXPILOT_GEN_ALL_CORE
[place]

## 2021-05-23 NOTE — PROGRESS NOTE ADULT - PROBLEM SELECTOR PLAN 8
Patient requires total support for all ADL's.  PPSV2 20   %.
DNR/DNI, MOLST in chart
Ongoing discharge planning with the .   Will continue to co-mange with the medicine team
- Spoke with patient's grandson Dr. Reyes to provide updates on plan of care including increasing Morphine gtt rate and increasing PRN dosing of Morphine for better symptom control. Dr. Reyes to come see patient later this afternoon.   - Continue with management in PCU

## 2021-05-23 NOTE — PROGRESS NOTE ADULT - PROBLEM SELECTOR PLAN 2
- Increase to IV Morphine infusion at 2 ml/ hour  - Increase to IV Morphine 4mg IV q1hr PRN for pain  - Bowel regimen while on opioids

## 2021-05-23 NOTE — PROGRESS NOTE ADULT - SUBJECTIVE AND OBJECTIVE BOX
GAP TEAM PALLIATIVE CARE UNIT PROGRESS NOTE:      [  ] Patient on hospice program.    INDICATION FOR PALLIATIVE CARE UNIT SERVICES: end of life care in setting of 89 year old female with hx of dementia p/w AMS due to severe hypernatremia from dehydration with positive LE ulcer infection and mucus plugging of bronchi and left lung collapse.    INTERVAL HPI/OVERNIGHT EVENTS:    Overnight, Morphine gtt increased to 1.5mg/ hour  In past 24 hours, patient received 6 prn doses of IV Morphine 2mg and 1 prn dose of Robinul.  Patient seen at bedside; patient is non-verbal     DNR on chart: Yes    Yes      Allergies: penicillin (Rash)    MEDICATIONS  (STANDING):  fosphenytoin IVPB 100 milliGRAM(s) PE IV Intermittent every 8 hours  levETIRAcetam  IVPB 750 milliGRAM(s) IV Intermittent every 12 hours  morphine  Infusion. 2 mG/Hr (2 mL/Hr) IV Continuous <Continuous>  sodium chloride 0.9%. 1000 milliLiter(s) (10 mL/Hr) IV Continuous <Continuous>    MEDICATIONS  (PRN):  acetaminophen  Suppository .. 650 milliGRAM(s) Rectal every 6 hours PRN Temp greater or equal to 38C (100.4F)  bisacodyl Suppository 10 milliGRAM(s) Rectal daily PRN Constipation  glycopyrrolate Injectable 0.4 milliGRAM(s) IV Push every 6 hours PRN secretion  LORazepam   Injectable 0.5 milliGRAM(s) IV Push every 1 hour PRN Agitation/Anxiety/ Refractory Dyspnea  morphine  - Injectable 4 milliGRAM(s) IV Push every 1 hour PRN pain  morphine  - Injectable 4 milliGRAM(s) IV Push every 1 hour PRN dyspnea    ITEMS UNCHECKED ARE NOT PRESENT    PRESENT SYMPTOMS: [X ]Unable to obtain due to poor mentation   Source if other than patient:  [ ]Family   [ X]Team     Pain: [ X] yes [ ] no see pain ad score  QOL impact -   Location -                    Aggravating factors -  Quality -  Radiation -  Timing-  Severity (0-10 scale):  Minimal acceptable level (0-10 scale):     Dyspnea:                           [ ]Mild [ ]Moderate [ ]Severe  Anxiety:                             [ ]Mild [ ]Moderate [ ]Severe  Fatigue:                             [ ]Mild [ ]Moderate [ ]Severe  Nausea:                             [ ]Mild [ ]Moderate [ ]Severe  Loss of appetite:              [ ]Mild [ ]Moderate [ ]Severe  Constipation:                    [ ]Mild [ ]Moderate [ ]Severe    PAINAD Score: 0     [email or web address] (Ctrl +  left click to view)  		  Other Symptoms:  [ ]All other review of systems negative - unable to obtain due to mental status    Palliative Performance Status Version 2:   10 %         [email or web address]    PHYSICAL EXAM:  Vital Signs Last 24 Hrs  T(C): 37.4 (23 May 2021 09:21), Max: 38.3 (23 May 2021 07:35)  T(F): 99.3 (23 May 2021 09:21), Max: 101 (23 May 2021 07:35)  HR: 120 (23 May 2021 09:21) (120 - 125)  BP: 89/58 (23 May 2021 09:21) (84/56 - 89/58)  BP(mean): --  RR: 24 (23 May 2021 09:21) (24 - 24)  SpO2: 85% (23 May 2021 09:21) (83% - 85%)      GENERAL:  [ ]Alert  [ ]Oriented x   [ X]Lethargic  [ ]Cachexia  [ ]Unarousable  [ ]Verbal [ X]Non-Verbal  Behavioral:   [ ] Anxiety  [ ] Delirium [ ] Agitation [ ] Other  HEENT:  [X ]Normal   [ ]Dry mouth   [ ]ET Tube/Trach  [ ]Oral lesions  PULMONARY:   [ ]Clear [ x]Tachypnea  [ ]Audible excessive secretions   [ ]Rhonchi        [ ]Right [ ]Left [ ]Bilateral  [ ]Crackles        [ ]Right [ ]Left [ ]Bilateral  [ ]Wheezing     [ ]Right [ ]Left [ ]Bilateral  [x ]Diminshed BS [ ]Right [ ]Left [ X]Bilateral    CARDIOVASCULAR:    [ ]Regular [ ]Irregular [ X]Tachy  [ ]Jerry [ ]Murmur [ ]Other  GASTROINTESTINAL:  [ X]Soft  [ ]Distended   [ X]+BS  [X ]Non tender [ ]Tender  [ ]PEG [ ]OGT/ NGT   Last BM:  5/18/21  GENITOURINARY:  [ ]Normal [ X] Incontinent   [ ]Oliguria/Anuria   [x ]Qureshi  MUSCULOSKELETAL:   [ ]Normal   [ X]Weakness  [ X]Bed/Wheelchair bound [ ]Edema  NEUROLOGIC:   [ ]No focal deficits  [X ] Cognitive impairment  [ ] Dysphagia [ ]Dysarthria [ ] Paresis [ ]Other   SKIN:   [ ]Normal  [ ]Rash     [ X]Pressure ulcer(s)  [X ]y [ ]n  Present on admission  Sacrum stage 2, Coccyx stage 2 and left first toe unstageable. Please see nursing assessment for further details     CRITICAL CARE:  [ ] Shock Present  [ ]Septic [ ]Cardiogenic [ ]Neurologic [ ]Hypovolemic  [ ]  Vasopressors [ ]  Inotropes   [ ] Respiratory failure present [ ] Mechanical Ventilation [ ] Non-invasive ventilatory support [ ] High-Flow  [ ] Acute  [ ] Chronic [ ] Hypoxic  [ ] Hypercarbic [ ] Other  [X ] Other organ failure- Brain-dementia        LABS: None new    RADIOLOGY & ADDITIONAL STUDIES: None new    PROTEIN CALORIE MALNUTRITION: [ ] mild [ ] moderate [ ] severe  [ ] underweight [ ] morbid obesity    https://www.andeal.org/vault/2440/web/files/ONC/Table_Clinical%20Characteristics%20to%20Document%20Malnutrition-White%20JV%20et%20al%555222.pdf    Height (cm): 154.9 (05-12-21 @ 13:09), 160 (02-22-21 @ 16:10)  Weight (kg): 48.2 (05-12-21 @ 13:09), 47.2 (02-22-21 @ 16:10)  BMI (kg/m2): 20.1 (05-12-21 @ 13:09), 18.4 (02-22-21 @ 16:10)    [X ] PPSV2 < or = 30% [ ] significant weight loss [X ] poor nutritional intake [ ] anasarca Albumin, Serum: 2.0 g/dL (05-16-21 @ 07:03)    Artificial Nutrition [ ]     REFERRALS:   [ ]Chaplaincy  [ ] Hospice  [ ]Child Life  [X ]Social Work  [ ]Case management [ ]Holistic Therapy [ ] Physical Therapy [ ] Dietary   Goals of Care Document:

## 2021-07-19 NOTE — ED ADULT NURSE NOTE - PRO INTERPRETER NEED 2
English Ketoconazole Pregnancy And Lactation Text: This medication is Pregnancy Category C and it isn't know if it is safe during pregnancy. It is also excreted in breast milk and breast feeding isn't recommended.

## 2021-09-02 NOTE — PROGRESS NOTE ADULT - I WAS PHYSICALLY PRESENT FOR THE KEY PORTIONS OF THE EVALUATION AND MANAGEMENT (E/M) SERVICE PROVIDED.  I AGREE WITH THE ABOVE HISTORY, PHYSICAL, AND PLAN WHICH I HAVE REVIEWED AND EDITED WHERE APPROPRIATE
----- Message from Jazmin Toledo MD sent at 9/2/2021  6:38 PM CDT -----  Please inform patient test results are normal for UA, no infection  
Statement Selected

## 2022-06-14 NOTE — PATIENT PROFILE ADULT - FOOD INSECURITY
Received call from mother. Pt does not currently have a rash. She went to sleep around 8:30pm yesterday and then woke up around midnight scratching her ankle. Mother thought that she had a mosquito bite, but then noted that there were what looked like hives present to her ankle and her wrist, seeming to spread rapidly. She was given a dose of benadryl and the symptoms resolved within 30 minutes. Mother notes that Ashley was scratching her stomach prior to benadryl as well. Ashley has been taking her antibiotic as prescribed; last dose around 1pm this afternoon and has tolerated it so far. Mother was educated on s/sx anaphylaxis for which to call 911 if developed. Mother will send photos of rash from last night and await further instruction from PCP. Next dose of medication will not be due until tomorrow afternoon.   no

## 2022-08-30 NOTE — ED PROCEDURE NOTE - NS ED PROC PERFORMED BY1 FT
The nerve conduction studies of your legs do show evidence for a peripheral neuropathy. This is a pattern of nerve damage that can arise from several different causes. I suspect it is likely from your diabetes. But we are going to do some lab work to look for other potential causes. Stop by the lab on the first floor on your way out to get the blood work done. The poor sensation in your feet is likely underlying a lot of your balance concerns. I think some of the other previous falls were related to dizziness from drops in her blood pressure. We should continue to keep an eye on both of these issues. Should your balance worsen, I would recommend a referral to physical therapy. Let us know if you are interested. I would recommend a cane for extra stability and to help prevent falls.
Ajay Page MD, FACEP

## 2022-10-08 NOTE — ED PROVIDER NOTE - NS ED MD DISPO DIVISION
1. Continue current plan with no evidence of addiction or diversion. Stable on current medication without adverse events. 2. Refill  fentanyl  50 mcg patch every 48 hours. 3. Refill hydrocodone 10/325 mg once daily as needed for pain. 4. Refill Valium 5 mg once daily as needed with one refill  5. Refill Ambien 12.5 ER once nightly. With one refill  6. Add Naloxone 4 mg nasal spray for opioid induced respiratory depression emergency only. 7. Discussed risks of addiction, dependency, and opioid induced hyperalgesia.    8. Return to clinic in 3 months PAST MEDICAL HISTORY:  No pertinent past medical history      [facility]

## 2022-11-01 NOTE — ED ADULT NURSE NOTE - SUICIDE SCREENING QUESTION 1
Patient unable to complete Cell[employer] Counseling:  I discussed with the patient the risks of mycophenolate mofetil including but not limited to infection/immunosuppression, GI upset, hypokalemia, hypercholesterolemia, bone marrow suppression, lymphoproliferative disorders, malignancy, GI ulceration/bleed/perforation, colitis, interstitial lung disease, kidney failure, progressive multifocal leukoencephalopathy, and birth defects.  The patient understands that monitoring is required including a baseline creatinine and regular CBC testing. In addition, patient must alert us immediately if symptoms of infection or other concerning signs are noted.

## 2022-12-28 NOTE — ED PROVIDER NOTE - CLINICAL SUMMARY MEDICAL DECISION MAKING FREE TEXT BOX
Requested medication(s) are due for refill today: Yes  Patient has already received a courtesy refill: No  Other reason request has been forwarded to provider: AMS x 1 day w/ suspected sepsis 2/2 osteo/cellulitis. Will get septic w/u, screening CTH, abx, gentle IV hydration and admit

## 2023-01-01 NOTE — ED ADULT TRIAGE NOTE - BEFAST ARM SIDE DRIFT
I have called Radiology they schedule patient for tomorrow I will call parent and let them know.    No

## 2023-05-22 NOTE — PROGRESS NOTE ADULT - PROBLEM SELECTOR PROBLEM 3
[facility]           Fax: [phone]                           Outpatient Physical Therapy                                                                            Daily Note    Patient: Lissette Montejo : 1989  CSN #: [id number]   Referring Physician: Adriane Banks MD  Date: 2023    Diagnosis: Lumbar degenerative disc disease, M51.36, facet arthropathy, M47.816, radiculopathy of lumbar region, M54.16  Treatment Diagnosis: LBP with radiculopathy    Onset Date: 23  PT Insurance Information: [address]  Total # of Visits Approved: 8 Per Physician Order  Total # of Visits to Date: 6  No Show: 0  Canceled Appointment: 0    Pre-Treatment Pain:  4/10  Subjective: pt reports current central LBP 4/10 this date. pt reports she was able to take a walk yesterday (~3/4 to 1 mile roughly?), was a little sore and had numbness into her R thigh following. s/s alleviated with rest.    Exercises:  Exercise 1: R side glides (radicular symptoms down R LE at 5th rep). Exercise 2: aq: offloaded enviornment to reduce compressive forces: HEP: HS stretch and piriformis stretch  Exercise 3: aq: Shallow water walking 3 laps each fwd, bwd and lateral without UE support  Exercise 4: aq: Shallow sink therex x 15 B LE, march to ER/IR x 15, hip circles fwd and bwd x 15 each. Exercise 6: aq: Semi deep single dumbbells: 90/90, chest fly--x15. Stir the pot CW and CCW, figure 8 x 10 each  Exercise 7: aq: Shallow power walk with green handbells x 3 laps. Exercise 8: aq: resisted marches and hip abduction x 15 shallow. Exercise 9: aq: rows/ext with BTB x 10  Exercise 10: aq:  B LE lunges x 10 each shallow    Assessment  Body Structures, Functions, Activity Limitations Requiring Skilled Therapeutic Intervention: Decreased functional mobility , Decreased ROM, Decreased strength, Decreased tolerance to work activity, Decreased endurance, Decreased posture, Mucus plugging of bronchi

## 2024-03-13 NOTE — PROGRESS NOTE ADULT - SUBJECTIVE AND OBJECTIVE BOX
Patient presents for nurse BP and weight check. He is prescribed phentermine. Reports he just went on 2 all inclusive vacations and was not eating and drinking well so weight will likely be up due to this. However, he was previously doing well on medication. He does need refill of phentermine at this time. Will forward to PCP to advise. Rx pending and pharmacy set up. Next nurse BP and weight check scheduled for 4/10/24    BP Readings from Last 3 Encounters:   03/13/24 118/81   02/07/24 129/86   01/18/24 133/87     Wt Readings from Last 5 Encounters:   03/13/24 (!) 147.7 kg (325 lb 9.6 oz)   02/07/24 (!) 146.9 kg (323 lb 12.8 oz)   01/18/24 (!) 150.5 kg (331 lb 11.2 oz)   12/21/23 (!) 155.9 kg (343 lb 12.8 oz)   11/15/23 (!) 161.6 kg (356 lb 3.2 oz)      PROGRESS NOTE:     CONTACT INFO:  Alexander Taylor MD (Resident Physician - PGY-1 - Internal Medicine)  [email or web address]  Pager: [phone] (any site) or [phone] ([facility] only)    Patient is a 89y old  Female who presents with a chief complaint of AMS (24 Feb 2021 12:57)      SUBJECTIVE / OVERNIGHT EVENTS:  No acute events overnight. Patient seen and evaluated at bedside. No fever/chills.  Denies SOB at rest, chest pain, palpitations, abdominal pain, nausea/vomiting    ADDITIONAL REVIEW OF SYSTEMS:    MEDICATIONS  (STANDING):  cefepime   IVPB 1000 milliGRAM(s) IV Intermittent every 12 hours  collagenase Ointment 1 Application(s) Topical daily  fosphenytoin IVPB 100 milliGRAM(s) PE IV Intermittent three times a day  levETIRAcetam  IVPB 500 milliGRAM(s) IV Intermittent every 12 hours  metoprolol tartrate Injectable 5 milliGRAM(s) IV Push every 6 hours  mupirocin 2% Ointment 1 Application(s) Topical every 12 hours  senna 2 Tablet(s) Oral at bedtime  vancomycin  IVPB 1000 milliGRAM(s) IV Intermittent every 24 hours    MEDICATIONS  (PRN):  diphenhydrAMINE   Injectable 25 milliGRAM(s) IV Push once PRN Rash      CAPILLARY BLOOD GLUCOSE      POCT Blood Glucose.: 122 mg/dL (24 Feb 2021 08:14)    I&O's Summary    23 Feb 2021 07:01  -  24 Feb 2021 07:00  --------------------------------------------------------  IN: 770 mL / OUT: 0 mL / NET: 770 mL    24 Feb 2021 07:01  -  24 Feb 2021 13:18  --------------------------------------------------------  IN: 150 mL / OUT: 250 mL / NET: -100 mL        PHYSICAL EXAM:  Vital Signs Last 24 Hrs  T(C): 37.1 (24 Feb 2021 11:51), Max: 37.1 (24 Feb 2021 11:51)  T(F): 98.7 (24 Feb 2021 11:51), Max: 98.7 (24 Feb 2021 11:51)  HR: 73 (24 Feb 2021 11:51) (73 - 94)  BP: 135/64 (24 Feb 2021 11:51) (120/68 - 138/83)  BP(mean): --  RR: 18 (24 Feb 2021 11:51) (18 - 18)  SpO2: 97% (24 Feb 2021 11:51) (94% - 97%)    CONSTITUTIONAL: NAD, well-developed  RESPIRATORY: Normal respiratory effort; lungs are clear to auscultation bilaterally  CARDIOVASCULAR: Regular rate and rhythm, normal S1 and S2, no murmur/rub/gallop; No lower extremity edema; Peripheral pulses are 2+ bilaterally  ABDOMEN: Nontender to palpation, normoactive bowel sounds, no rebound/guarding; No hepatosplenomegaly  MUSCLOSKELETAL: no clubbing or cyanosis of digits; no joint swelling or tenderness to palpation  PSYCH: A+O to person, place, and time; affect appropriate    LABS:                        12.3   5.61  )-----------( 163      ( 23 Feb 2021 06:58 )             40.8     02-24    138  |  106  |  12  ----------------------------<  113<H>  4.2   |  19<L>  |  0.44<L>    Ca    8.5      24 Feb 2021 09:35  Phos  3.1     02-24  Mg     2.2     02-24    TPro  6.1  /  Alb  2.8<L>  /  TBili  0.3  /  DBili  x   /  AST  25  /  ALT  21  /  AlkPhos  176<H>  02-24    PT/INR - ( 22 Feb 2021 18:40 )   PT: 14.0 sec;   INR: 1.18 ratio         PTT - ( 22 Feb 2021 18:40 )  PTT:27.4 sec          Culture - Blood (collected 23 Feb 2021 01:44)  Source: .Blood Blood-Peripheral  Preliminary Report (24 Feb 2021 02:02):    No growth to date.    Culture - Blood (collected 23 Feb 2021 01:42)  Source: .Blood Blood-Peripheral  Preliminary Report (24 Feb 2021 02:02):    No growth to date.        RADIOLOGY & ADDITIONAL TESTS:  Results Reviewed:   Imaging Personally Reviewed:  Electrocardiogram Personally Reviewed:    COORDINATION OF CARE:  Care Discussed with Consultants/Other Providers [Y/N]:  Prior or Outpatient Records Reviewed [Y/N]:   PROGRESS NOTE:     CONTACT INFO:  Alexander Taylor MD (Resident Physician - PGY-1 - Internal Medicine)  [email or web address]  Pager: [phone] (any site) or [phone] ([facility] only)    Patient is a 89y old  Female who presents with a chief complaint of AMS (24 Feb 2021 12:57)      SUBJECTIVE / OVERNIGHT EVENTS:  No acute events overnight. MRI head yesterday significant for rim enhancing collection within previously known chronic SDH that is concerning for infection/empyema. Patient seen and evaluated at bedside. Significant improvement of mental status and pt is now near previously known baseline w/some confusion significant features of dementia, but conversant and interactive. ROS difficult due to degree of baseline cognitive impairment, but pt denies active issues such as pain, chest pain, SOB. RN discussion/chart review reveal no fever/chills. No nausea/vomiting or cough    ADDITIONAL REVIEW OF SYSTEMS:    MEDICATIONS  (STANDING):  cefepime   IVPB 1000 milliGRAM(s) IV Intermittent every 12 hours  collagenase Ointment 1 Application(s) Topical daily  fosphenytoin IVPB 100 milliGRAM(s) PE IV Intermittent three times a day  levETIRAcetam  IVPB 500 milliGRAM(s) IV Intermittent every 12 hours  metoprolol tartrate Injectable 5 milliGRAM(s) IV Push every 6 hours  mupirocin 2% Ointment 1 Application(s) Topical every 12 hours  senna 2 Tablet(s) Oral at bedtime  vancomycin  IVPB 1000 milliGRAM(s) IV Intermittent every 24 hours    MEDICATIONS  (PRN):  diphenhydrAMINE   Injectable 25 milliGRAM(s) IV Push once PRN Rash      CAPILLARY BLOOD GLUCOSE      POCT Blood Glucose.: 122 mg/dL (24 Feb 2021 08:14)    I&O's Summary    23 Feb 2021 07:01  -  24 Feb 2021 07:00  --------------------------------------------------------  IN: 770 mL / OUT: 0 mL / NET: 770 mL    24 Feb 2021 07:01  -  24 Feb 2021 13:18  --------------------------------------------------------  IN: 150 mL / OUT: 250 mL / NET: -100 mL        PHYSICAL EXAM:  Vital Signs Last 24 Hrs  T(C): 37.1 (24 Feb 2021 11:51), Max: 37.1 (24 Feb 2021 11:51)  T(F): 98.7 (24 Feb 2021 11:51), Max: 98.7 (24 Feb 2021 11:51)  HR: 73 (24 Feb 2021 11:51) (73 - 94)  BP: 135/64 (24 Feb 2021 11:51) (120/68 - 138/83)  BP(mean): --  RR: 18 (24 Feb 2021 11:51) (18 - 18)  SpO2: 97% (24 Feb 2021 11:51) (94% - 97%)    Gen: Fatigued appearing, but conversant. Following basic commands (squeeze my hand, open your eyes); significant cachexia  Head: NCAT  HEENT: PERRL, Oral mucosa is moderately dry, but w/interval improvement; normal conjunctiva, anicteric, neck supple  Lung: CTAB no wheezing/rales/rhonchi  CV: Normal rate w/irregularly irregular rhythm; No MRG	  Abd: soft, NTND, no rebound or guarding, +BSx4  MSK: No edema, no visible deformities  Neuro: AAOx1 (able to provide name). Moving all extremity grossly, but poor movement of L arm. L arm mildly shabana. Following basic commands. Conversant, but confused. Unaware of presence in the hospital and asking where we are going  Skin: 2x2cm stage 2 ulcer of the medial aspect of the L great toe w/surrounding erythema and warmth w/exposed bone; covered and dressing is CDI. 2mm stage 2 chronic ulcer to base of R 5th toe; covered and dressing is CDI      LABS:                        12.3   5.61  )-----------( 163      ( 23 Feb 2021 06:58 )             40.8     02-24    138  |  106  |  12  ----------------------------<  113<H>  4.2   |  19<L>  |  0.44<L>    Ca    8.5      24 Feb 2021 09:35  Phos  3.1     02-24  Mg     2.2     02-24    TPro  6.1  /  Alb  2.8<L>  /  TBili  0.3  /  DBili  x   /  AST  25  /  ALT  21  /  AlkPhos  176<H>  02-24    PT/INR - ( 22 Feb 2021 18:40 )   PT: 14.0 sec;   INR: 1.18 ratio         PTT - ( 22 Feb 2021 18:40 )  PTT:27.4 sec          Culture - Blood (collected 23 Feb 2021 01:44)  Source: .Blood Blood-Peripheral  Preliminary Report (24 Feb 2021 02:02):    No growth to date.    Culture - Blood (collected 23 Feb 2021 01:42)  Source: .Blood Blood-Peripheral  Preliminary Report (24 Feb 2021 02:02):    No growth to date.        RADIOLOGY & ADDITIONAL TESTS:  Results Reviewed: MRI Brain 2/23/21  Imaging Personally Reviewed: MRI Brain 2/23/21    < from: MR Head w/wo IV Cont (02.23.21 @ 21:00) >  EXAM:  MR BRAIN WAW IC                            PROCEDURE DATE:  02/23/2021            INTERPRETATION:  Contrast-enhanced MRI of the brain.    CLINICAL INDICATION: sdh, encephalopathy, altered mental status    TECHNIQUE:  Multiplanar, multisequence MR images of the brain were obtained before and after the intravenous administration of 5 cc of Gadavist. 2.5 cc were discarded.    COMPARISON: CT brain 2/22/2021    FINDINGS:    Study is limited by motion.    Similar right lateral convexity subdural collection measures 1.1 cm in greatest depth. The collection peripherally enhances. Minimal restricted diffusion within the posterior aspect of the collection.    Similar minimal leftward midline shift. No effacement of basal cisterns. Similar mildventricular dilatation.    No acute parenchymal hemorrhage or acute infarction. Extensive white matter microvascular ischemic disease. Signal voids are seen within the major intracranial vessels consistent with their patency.    No abnormal parenchymal or leptomeningeal enhancement.    Hypoplastic, opacified left maxillary sinus is redemonstrated. Remaining paranasal sinuses and mastoid air cells clear.    Orbits unremarkable. Sellar/suprasellar structures and craniocervical junction unremarkable.    IMPRESSION:    Similar right lateral convexity subdural collection measures 1.1 cm in greatest depth. The collection peripherally enhances. Minimal restricted diffusion within the posterior aspect of the collection. Findings could represent the presence of an empyema.    No abnormal parenchymal or leptomeningeal enhancement.    No acute parenchymal hemorrhage or acute infarction.    Similar minimal leftward midline shift. No effacement of basal cisterns. Similar mild ventricular dilatation.    < end of copied text >      COORDINATION OF CARE:  Care Discussed with Consultants/Other Providers [Y/N]:  Prior or Outpatient Records Reviewed [Y/N]: